# Patient Record
Sex: FEMALE | Race: ASIAN | NOT HISPANIC OR LATINO | Employment: UNEMPLOYED | URBAN - METROPOLITAN AREA
[De-identification: names, ages, dates, MRNs, and addresses within clinical notes are randomized per-mention and may not be internally consistent; named-entity substitution may affect disease eponyms.]

---

## 2017-03-16 ENCOUNTER — OFFICE VISIT - HEALTHEAST (OUTPATIENT)
Dept: FAMILY MEDICINE | Facility: CLINIC | Age: 27
End: 2017-03-16

## 2017-03-16 DIAGNOSIS — Z00.00 ROUTINE GENERAL MEDICAL EXAMINATION AT A HEALTH CARE FACILITY: ICD-10-CM

## 2017-03-16 DIAGNOSIS — L70.9 ACNE: ICD-10-CM

## 2017-03-16 DIAGNOSIS — L65.9 HAIR LOSS: ICD-10-CM

## 2017-03-16 DIAGNOSIS — N92.6 ABNORMAL MENSTRUAL PERIODS: ICD-10-CM

## 2017-03-16 DIAGNOSIS — Z23 NEED FOR HPV VACCINATION: ICD-10-CM

## 2017-03-16 DIAGNOSIS — R10.9 ABDOMINAL PAIN: ICD-10-CM

## 2017-03-16 DIAGNOSIS — R53.83 FATIGUE: ICD-10-CM

## 2017-03-16 DIAGNOSIS — A59.01 TRICHOMONAS VAGINITIS: ICD-10-CM

## 2017-03-16 ASSESSMENT — MIFFLIN-ST. JEOR: SCORE: 1124.48

## 2017-03-17 ENCOUNTER — COMMUNICATION - HEALTHEAST (OUTPATIENT)
Dept: FAMILY MEDICINE | Facility: CLINIC | Age: 27
End: 2017-03-17

## 2017-03-17 ENCOUNTER — AMBULATORY - HEALTHEAST (OUTPATIENT)
Dept: LAB | Facility: CLINIC | Age: 27
End: 2017-03-17

## 2017-03-17 DIAGNOSIS — Z00.00 ROUTINE GENERAL MEDICAL EXAMINATION AT A HEALTH CARE FACILITY: ICD-10-CM

## 2017-03-20 ENCOUNTER — COMMUNICATION - HEALTHEAST (OUTPATIENT)
Dept: FAMILY MEDICINE | Facility: CLINIC | Age: 27
End: 2017-03-20

## 2017-03-21 ENCOUNTER — COMMUNICATION - HEALTHEAST (OUTPATIENT)
Dept: SCHEDULING | Facility: CLINIC | Age: 27
End: 2017-03-21

## 2017-03-21 ENCOUNTER — AMBULATORY - HEALTHEAST (OUTPATIENT)
Dept: FAMILY MEDICINE | Facility: CLINIC | Age: 27
End: 2017-03-21

## 2017-03-22 ENCOUNTER — COMMUNICATION - HEALTHEAST (OUTPATIENT)
Dept: FAMILY MEDICINE | Facility: CLINIC | Age: 27
End: 2017-03-22

## 2017-03-22 LAB
BKR LAB AP ABNORMAL BLEEDING: NO
BKR LAB AP BIRTH CONTROL/HORMONES: NORMAL
BKR LAB AP CERVICAL APPEARANCE: NORMAL
BKR LAB AP GYN ADEQUACY: NORMAL
BKR LAB AP GYN INTERPRETATION: NORMAL
BKR LAB AP GYN OTHER FINDINGS: NORMAL
BKR LAB AP HPV REFLEX: NORMAL
BKR LAB AP LMP: NORMAL
BKR LAB AP PATIENT STATUS: NORMAL
BKR LAB AP PREVIOUS ABNORMAL: NORMAL
BKR LAB AP PREVIOUS NORMAL: NORMAL
HIGH RISK?: NO
PATH REPORT.COMMENTS IMP SPEC: NORMAL
RESULT FLAG (HE HISTORICAL CONVERSION): NORMAL

## 2017-04-17 ENCOUNTER — AMBULATORY - HEALTHEAST (OUTPATIENT)
Dept: FAMILY MEDICINE | Facility: CLINIC | Age: 27
End: 2017-04-17

## 2017-04-17 DIAGNOSIS — L70.9 ACNE: ICD-10-CM

## 2017-04-25 ENCOUNTER — OFFICE VISIT - HEALTHEAST (OUTPATIENT)
Dept: FAMILY MEDICINE | Facility: CLINIC | Age: 27
End: 2017-04-25

## 2017-04-25 DIAGNOSIS — L70.9 ACNE: ICD-10-CM

## 2017-04-25 DIAGNOSIS — A59.01 TRICHOMONAS VAGINITIS: ICD-10-CM

## 2017-04-25 ASSESSMENT — MIFFLIN-ST. JEOR: SCORE: 1111.44

## 2017-04-28 ENCOUNTER — COMMUNICATION - HEALTHEAST (OUTPATIENT)
Dept: FAMILY MEDICINE | Facility: CLINIC | Age: 27
End: 2017-04-28

## 2017-05-24 ENCOUNTER — COMMUNICATION - HEALTHEAST (OUTPATIENT)
Dept: FAMILY MEDICINE | Facility: CLINIC | Age: 27
End: 2017-05-24

## 2017-05-24 RX ORDER — BENZOYL PEROXIDE 5 G/100G
GEL TOPICAL
Qty: 45 G | Refills: 3 | Status: SHIPPED | OUTPATIENT
Start: 2017-05-24 | End: 2022-01-07

## 2017-11-08 ENCOUNTER — COMMUNICATION - HEALTHEAST (OUTPATIENT)
Dept: FAMILY MEDICINE | Facility: CLINIC | Age: 27
End: 2017-11-08

## 2017-11-13 ENCOUNTER — OFFICE VISIT - HEALTHEAST (OUTPATIENT)
Dept: FAMILY MEDICINE | Facility: CLINIC | Age: 27
End: 2017-11-13

## 2017-11-13 DIAGNOSIS — G43.109 OCULAR MIGRAINE: ICD-10-CM

## 2017-11-13 DIAGNOSIS — Z00.00 ROUTINE GENERAL MEDICAL EXAMINATION AT A HEALTH CARE FACILITY: ICD-10-CM

## 2017-11-13 RX ORDER — SUMATRIPTAN 50 MG/1
50 TABLET, FILM COATED ORAL
Qty: 10 TABLET | Refills: 3 | Status: SHIPPED | OUTPATIENT
Start: 2017-11-13 | End: 2022-01-07

## 2017-11-13 ASSESSMENT — MIFFLIN-ST. JEOR: SCORE: 1118.81

## 2017-11-14 ENCOUNTER — COMMUNICATION - HEALTHEAST (OUTPATIENT)
Dept: FAMILY MEDICINE | Facility: CLINIC | Age: 27
End: 2017-11-14

## 2017-12-26 ENCOUNTER — OFFICE VISIT - HEALTHEAST (OUTPATIENT)
Dept: FAMILY MEDICINE | Facility: CLINIC | Age: 27
End: 2017-12-26

## 2017-12-26 DIAGNOSIS — D23.9 DYSPLASTIC NEVUS: ICD-10-CM

## 2017-12-28 LAB
LAB AP CHARGES (HE HISTORICAL CONVERSION): NORMAL
PATH REPORT.COMMENTS IMP SPEC: NORMAL
PATH REPORT.FINAL DX SPEC: NORMAL
PATH REPORT.GROSS SPEC: NORMAL
PATH REPORT.MICROSCOPIC SPEC OTHER STN: NORMAL
PATH REPORT.RELEVANT HX SPEC: NORMAL
RESULT FLAG (HE HISTORICAL CONVERSION): NORMAL

## 2017-12-29 ENCOUNTER — COMMUNICATION - HEALTHEAST (OUTPATIENT)
Dept: FAMILY MEDICINE | Facility: CLINIC | Age: 27
End: 2017-12-29

## 2018-01-02 ENCOUNTER — OFFICE VISIT - HEALTHEAST (OUTPATIENT)
Dept: FAMILY MEDICINE | Facility: CLINIC | Age: 28
End: 2018-01-02

## 2018-01-02 DIAGNOSIS — D22.9 INTRADERMAL PIGMENTED NEVUS: ICD-10-CM

## 2018-01-02 ASSESSMENT — MIFFLIN-ST. JEOR: SCORE: 1135.82

## 2018-06-29 ENCOUNTER — OFFICE VISIT - HEALTHEAST (OUTPATIENT)
Dept: FAMILY MEDICINE | Facility: CLINIC | Age: 28
End: 2018-06-29

## 2018-06-29 DIAGNOSIS — L91.0 KELOID SCAR: ICD-10-CM

## 2018-07-31 ENCOUNTER — RECORDS - HEALTHEAST (OUTPATIENT)
Dept: ADMINISTRATIVE | Facility: OTHER | Age: 28
End: 2018-07-31

## 2019-10-01 ENCOUNTER — OFFICE VISIT - HEALTHEAST (OUTPATIENT)
Dept: FAMILY MEDICINE | Facility: CLINIC | Age: 29
End: 2019-10-01

## 2019-10-01 DIAGNOSIS — Z31.69 ENCOUNTER FOR PRECONCEPTION CONSULTATION: ICD-10-CM

## 2019-10-01 DIAGNOSIS — Z13.220 SCREENING FOR CHOLESTEROL LEVEL: ICD-10-CM

## 2019-10-01 LAB
CHOLEST SERPL-MCNC: 210 MG/DL
FASTING STATUS PATIENT QL REPORTED: NO
HDLC SERPL-MCNC: 66 MG/DL
LDLC SERPL CALC-MCNC: 119 MG/DL
TRIGL SERPL-MCNC: 125 MG/DL

## 2019-10-01 RX ORDER — PRENATAL VIT/IRON FUM/FOLIC AC 27MG-0.8MG
1 TABLET ORAL DAILY
Qty: 90 TABLET | Refills: 3 | Status: SHIPPED | OUTPATIENT
Start: 2019-10-01 | End: 2022-01-07

## 2019-10-01 ASSESSMENT — MIFFLIN-ST. JEOR: SCORE: 1176.64

## 2019-10-14 ENCOUNTER — COMMUNICATION - HEALTHEAST (OUTPATIENT)
Dept: FAMILY MEDICINE | Facility: CLINIC | Age: 29
End: 2019-10-14

## 2019-12-12 ENCOUNTER — OFFICE VISIT - HEALTHEAST (OUTPATIENT)
Dept: FAMILY MEDICINE | Facility: CLINIC | Age: 29
End: 2019-12-12

## 2019-12-12 DIAGNOSIS — H11.31 SUBCONJUNCTIVAL HEMORRHAGE OF RIGHT EYE: ICD-10-CM

## 2021-05-30 VITALS — WEIGHT: 95.5 LBS | BODY MASS INDEX: 17.57 KG/M2 | HEIGHT: 62 IN

## 2021-05-30 VITALS — BODY MASS INDEX: 17.94 KG/M2 | HEIGHT: 62 IN | WEIGHT: 97.5 LBS

## 2021-05-31 VITALS — WEIGHT: 96.25 LBS | HEIGHT: 62 IN | BODY MASS INDEX: 17.71 KG/M2

## 2021-05-31 VITALS — BODY MASS INDEX: 18.4 KG/M2 | HEIGHT: 62 IN | WEIGHT: 100 LBS

## 2021-06-01 VITALS — BODY MASS INDEX: 17.98 KG/M2 | WEIGHT: 99.12 LBS

## 2021-06-01 NOTE — PROGRESS NOTES
"S:  Dianne Ochoa is a 29 y.o. female who comes to the clinic today for  1.  Preconception counseling: She would like to get pregnant.  She did have a complication of a subchorionic hemorrhage as well as a marginal placenta previa that resolved during her last pregnancy.  She is wondering if there is anything that she can do to prevent this from happening again  She is not currently taking a prenatal vitamin.  She drinks no alcohol.  She has rare caffeine.  No new health problems in the family.  Her menses are not completely regular.  They will often be off by a week to week and a half.  She is worried because her nipples are not exactly the same size.  She says that it is essentially always been that way.  No other changes that she is noticed to her breasts.  They do get a little bit larger around the time of her menses and a little more tender but otherwise no discharge, no skin changes, no other problems.    She would like a screening test done for cholesterol today.  She is not fasting.  She denies any family history of significant heart disease or hypercholesterolemia.    I reviewed the pertinent family, social, surgical, medical history.      O:  /64 (Patient Site: Left Arm, Patient Position: Sitting, Cuff Size: Adult Regular)   Pulse 72   Temp 98  F (36.7  C) (Oral)   Resp 16   Ht 5' 2.25\" (1.581 m)   Wt 109 lb (49.4 kg)   LMP 2019   BMI 19.78 kg/m    Gen: no acute distress  Neck:  Supple, no lad,  Heart:  Regular rate and rhythm.  No m/r/g  Lungs: cta bilaterally, no wheezes or rhonchi.  Good air inspiration  Abdomen:  No masses or organomegaly  Extremities:  No edema.     Breasts: Bilateral breasts reveal that the nipples are not symmetric at the right nipple is very minimally larger than the left.  No other skin changes are noted.  No nipple discharge is noted.    Patient Active Problem List   Diagnosis     Pregnancy With Threatened      Placenta Previa     Antepartum hemorrhage     " Pain During Urination (Dysuria)     Pregnant     Group B Streptococcus carrier, delivered, current hospitalization     Normal delivery     H. pylori infection     Keloid scar     Current Outpatient Medications on File Prior to Visit   Medication Sig Dispense Refill     benzoyl peroxide 5 % gel Apply nightly to affected area 45 g 3     benzoyl peroxide 5 % topical lotion Use as directed to prevent acne 30 mL 12     cholecalciferol, vitamin D3, 2,000 unit cap Take 1 tablet by mouth.       condoms latex lubricated (CONDOMS-ARY LUBRICATED) Dejah Use as directed to prevent pregnancy 24 each 12     SUMAtriptan (IMITREX) 50 MG tablet Take 1 tablet (50 mg total) by mouth every 2 (two) hours as needed for migraine (max 200mg in 24hr period). 10 tablet 3     [DISCONTINUED] PRENATAL VITS W-CA,FE,FA,<1MG, (PRENATAL VITAMIN ORAL) Take 1 tablet by mouth.       No current facility-administered medications on file prior to visit.           No results found for this or any previous visit (from the past 48 hour(s)).     No images are attached to the encounter or orders placed in the encounter.       Assessment/Plan:  1. Encounter for preconception consultation  Reviewed the importance of folic acid and prenatal vitamin to prevent problems with spina bifida.  We reviewed the importance of a healthy plant-based diet with adequate protein.  We reviewed the importance of limiting caffeine.  We did talk about how there is not really anything that she can do to prevent subchorionic hemorrhage and/or placenta previa.  We did talk about timing of intercourse to account for irregular menses.  She will follow-up when she is pregnant or sooner if she has any problems.  I did advise her that her breast asymmetry is not unusual.  She will follow-up if she has any significant lumps or bumps discharge or other concerns.  - prenatal vit-iron fum-folic ac (PRENATAL VITAMIN) 27 mg iron- 0.8 mg Tab tablet; Take 1 tablet by mouth daily.  Dispense: 90  tablet; Refill: 3    2. Screening for cholesterol level    - Lipid Profile          Khadra Wheeler   10/1/2019 5:47 PM

## 2021-06-03 VITALS
RESPIRATION RATE: 16 BRPM | HEART RATE: 72 BPM | BODY MASS INDEX: 20.06 KG/M2 | DIASTOLIC BLOOD PRESSURE: 64 MMHG | SYSTOLIC BLOOD PRESSURE: 100 MMHG | TEMPERATURE: 98 F | WEIGHT: 109 LBS | HEIGHT: 62 IN

## 2021-06-04 VITALS
HEART RATE: 65 BPM | TEMPERATURE: 97.5 F | RESPIRATION RATE: 12 BRPM | DIASTOLIC BLOOD PRESSURE: 65 MMHG | SYSTOLIC BLOOD PRESSURE: 100 MMHG | WEIGHT: 110.8 LBS | BODY MASS INDEX: 20.1 KG/M2 | OXYGEN SATURATION: 100 %

## 2021-06-04 NOTE — PROGRESS NOTES
Chief Complaint   Patient presents with     Eye Problem     Rt upper eye red, no itchiness or burning, noticed 1 hr ago       HPI:  Dianne Ochoa is a 29 y.o. female who complains of redness of R upper eye which she noticed 1 hour ago.. Denies fever/chills, HA, vision changes, eye pain, drainage, itching, or FB sensation/exposure.    Problem List:  2018: Keloid scar  2017: H. pylori infection  2015: Pregnant  2015: Group B Streptococcus carrier, delivered, current   hospitalization  2015: Normal delivery  Pregnancy With Threatened   Placenta Previa  Antepartum hemorrhage  Pain During Urination (Dysuria)      Past Medical History:   Diagnosis Date     Pregnant        Social History     Tobacco Use     Smoking status: Never Smoker     Smokeless tobacco: Never Used   Substance Use Topics     Alcohol use: No       Review of Systems   Constitutional: Negative for chills and fever.   Eyes: Positive for redness. Negative for pain, discharge, itching and visual disturbance.   Respiratory: Negative for shortness of breath.    Cardiovascular: Negative for chest pain.   Gastrointestinal: Negative for abdominal pain, diarrhea, nausea and vomiting.   Skin: Negative for rash.   All other systems reviewed and are negative.      Vitals:    19 1308   BP: 100/65   Pulse: 65   Resp: 12   Temp: 97.5  F (36.4  C)   TempSrc: Oral   SpO2: 100%   Weight: 110 lb 12.8 oz (50.3 kg)       Physical Exam   Constitutional: She appears well-developed and well-nourished.  Non-toxic appearance.   HENT:   Head: Normocephalic and atraumatic.   Eyes: Pupils are equal, round, and reactive to light. EOM and lids are normal. Right conjunctiva has a hemorrhage (R upper eye behind eyelid).   Cardiovascular: Normal rate, regular rhythm and normal heart sounds.   Pulmonary/Chest: Effort normal and breath sounds normal.   Neurological: She is alert.   Skin: Skin is warm and dry.   Psychiatric: She has a normal mood and affect.        Labs:  No results found for this or any previous visit (from the past 72 hour(s)).    Radiology:    No results found.    Clinical Decision Making:      No sign of infection, PERRLA, EOMI. Discussed self limited nature of subconjunctival hemorrhage.    At the end of the encounter, I discussed results, diagnosis, medications. Discussed red flags for immediate return to clinic/ER, as well as indications for follow up if no improvement. Patient understood and agreed to plan. Patient was stable for discharge.    1. Subconjunctival hemorrhage of right eye           Return in about 2 weeks (around 12/26/2019) for Follow up w/ primary care provider if not improved.    PRINCE Kate, [facility]

## 2021-06-04 NOTE — PATIENT INSTRUCTIONS - HE
Patient Education     Subconjunctival Hemorrhage    A subconjunctival hemorrhage is when a blood vessel breaks open in the white of the eye. It causes a bright red patch in the white of the eye. It is similar to a bruise on the skin. This type of hemorrhage is common. It can look quite alarming, but it is usually harmless.  Understanding the conjunctiva  The conjunctiva is the thin layer that covers the inside of the eyelids and the surface of the eye. It has many tiny blood vessels that bring oxygen and nutrients to the eye. The sclera is the white part of the eye that lies beneath the conjunctiva. Sometimes a blood vessel in the conjunctiva breaks open and bleeds. The blood then collects under the conjunctiva and turns part of the eye red. Over several weeks, your body then absorbs the blood.  What causes subconjunctival hemorrhage?  In many cases the cause isn t known. But some health conditions may make it more likely. These include:    Eye injury    Eye surgery    High blood pressure    Inflammation of the conjunctiva    Contact lens use    Diabetes    Arteriosclerosis    Tumor of the conjunctiva    Diseases that affect blood clotting    Violent sneezing, coughing, or vomiting    Certain medicines that can increase bleeding, such as aspirin    Pushing hard during childbirth    Straining during constipation  Symptoms of subconjunctival hemorrhage  The main symptom is a red patch on the eye. You may notice it after waking up in the morning. In most cases just one eye will have a hemorrhage. It usually happens once and then goes away. But some health conditions may cause repeat hemorrhages. You may feel like you have something in your eye, but this is not common. The hemorrhage shouldn t affect your eyesight or cause any pain. If you do have pain, you may have another type of problem with your eye.  Diagnosing subconjunctival hemorrhage  Your healthcare provider will ask about your health history. You may have a  physical exam. This includes a basic eye exam. Your provider will make sure you don t have other causes of red eye that may need other treatment.  You will need to see an eye doctor (ophthalmologist) if you have had an eye injury. This doctor might use a special lighted microscope to look closely at your eye. This helps show the doctor if the injury hurt the eye itself and not just its outer layer.  If this is not your first subconjunctival hemorrhage, your doctor may need to find the cause. For example, you may need blood tests to check for a blood clotting disorder.  Treatment for subconjunctival hemorrhage  In most cases you will not need treatment. The red patch will usually go away on its own in a few weeks. It will turn from red to brown and then yellow. There are no treatments to speed up this process. Your doctor may suggest you use a warm compress and artificial tears eye drops to help relieve some of the redness.  If your subconjunctival hemorrhage was caused by a health condition, that condition will be treated. For example, you may need a blood pressure medicine to treat high blood pressure.  When to call your healthcare provider  Call your healthcare provider right away if you have any of these:    Hemorrhage that doesn t go away in 2 to 3 weeks    Eye pain    Loss of eyesight    Another subconjunctival hemorrhage    Date Last Reviewed: 2/1/2017 2000-2019 [employer]. [address] [place] [address], [place] [address]. All rights reserved. This information is not intended as a substitute for professional medical care. Always follow your healthcare professional's instructions.

## 2021-06-09 NOTE — PROGRESS NOTES
Assessment:      Healthy female exam.   1. Fatigue  Rule out organic causes of fatigue.  Encourage increased caloric intake.  Encourage increased exercise.  - Thyroid Cascade  - HM1(CBC and Differential)  - Pregnancy (Beta-hCG, Qual), Urine  - HM1 (CBC with Diff)    2. Hair loss      3. Routine general medical examination at a health care facility  Advised increase activity and increased caloric intake.  Continue with routine screening.    hpv vaccine done today.    - Thyroid Cascade  - Wet Prep, Vaginal  - Gynecologic Cytology (PAP Smear)  - condoms latex lubricated (CONDOMS-ARY LUBRICATED) Dejah; Use as directed to prevent pregnancy  Dispense: 24 each; Refill: 12  - Chlamydia trachomatis & Neisseria gonorrhoeae, Amplified Detection; Future    4. Abnormal menstrual periods  upt negative.    Encouraged increased calorie intake  - Thyroid Cascade  - HM1(CBC and Differential)  - Pregnancy (Beta-hCG, Qual), Urine  - HM1 (CBC with Diff)    5. Abdominal pain  Rule out h pylori.    Eat small frequent meals.   Ranitidine rx given today.    Follow up if sx worsen.    - H. pylori Antibody, IgG    6. Acne    - benzoyl peroxide 5 % topical lotion; Use as directed to prevent acne  Dispense: 30 mL; Refill: 12    7. Need for HPV vaccination    - HPV vaccine 9 valent 3 dose IM    8. Trichomonas vaginitis  Will contact pt with this result, and order urine gc/chlam.    - metroNIDAZOLE (FLAGYL) 500 MG tablet; Take 1 tablet (500 mg total) by mouth 2 (two) times a day for 7 days.  Dispense: 14 tablet; Refill: 0          Subjective:      Dianne Ochoa is a 26 y.o. female who presents for an annual exam. The patient is sexually active. The patient participates in regular exercise: no. The patient reports that there is not domestic violence in her life.     Healthy Habits:   Regular Exercise: No  Sunscreen Use: No- yes in the summer  Healthy Diet: Yes  Dental Visits Regularly: Yes  Seat Belt: Yes  Sexually active: Yes  Self Breast Exam  Monthly:No  Hemoccults: N/A  Flex Sig: N/A  Colonoscopy: N/A  Lipid Profile: N/A  Glucose Screen: N/A  Prevention of Osteoporosis: N/A  Last Dexa: N/A  Guns at Home:  No      Immunization History   Administered Date(s) Administered     HPV 9 Valent 2017     Hep B, historic 2004, 2005, 2005     IPV 2004, 2005, 2005     Influenza [id number], 2009     Influenza,seasonal quad, PF 10/03/2014     MMR 2004, 2005     Td, historic 2004, 2005     Tdap 2014     Varicella 2004     Immunization status: up to date and documented, missing doses of HPV, flu.    No exam data present    Gynecologic History  Patient's last menstrual period was 2017 (approximate).  Contraception: none  Last Pap: unknown. Results were: unsure  Last mammogram: n/a. Results were: n/a      OB History    Para Term  AB SAB TAB Ectopic Multiple Living   6 4 3 1 2 2    4      # Outcome Date GA Lbr Alfred/2nd Weight Sex Delivery Anes PTL Lv   6  01/17/15 36w2d 06:12 / 00:15 5 lb 7.5 oz (2.48 kg) F Vag-Spont None N Y   5 2012        N   4 2012        N   3 Term 09/08/10 40w0d  5 lb 5 oz (2.41 kg) F Vag-Spont   Y   2 Term 10/10/09 40w0d  6 lb 4 oz (2.835 kg) F Vag-Spont   Y   1 Term 10/16/08 40w0d  6 lb 6 oz (2.892 kg) F Vag-Spont   Y          Current Outpatient Prescriptions   Medication Sig Dispense Refill     benzoyl peroxide 5 % topical lotion Use as directed to prevent acne 30 mL 12     cholecalciferol, vitamin D3, 2,000 unit cap Take 1 tablet by mouth.       condoms latex lubricated (CONDOMS-ARY LUBRICATED) Dejah Use as directed to prevent pregnancy 24 each 12     PRENATAL VITS W-CA,FE,FA,<1MG, (PRENATAL VITAMIN ORAL) Take 1 tablet by mouth.       No current facility-administered medications for this visit.      Past Medical History:   Diagnosis Date     Pregnant      Past Surgical History:   Procedure Laterality Date     [place] ABDOMEN SURGERY PROC  "UNLISTED      Description: Hernia Repair;  Recorded: 08/12/2014;     Review of patient's allergies indicates no known allergies.  Family History   Problem Relation Age of Onset     No Medical Problems Mother      No Medical Problems Father      Social History     Social History     Marital status: Single     Spouse name: N/A     Number of children: N/A     Years of education: N/A     Occupational History     Not on file.     Social History Main Topics     Smoking status: Never Smoker     Smokeless tobacco: Never Used     Alcohol use No     Drug use: No     Sexual activity: Yes     Partners: Male     Birth control/ protection: None     Other Topics Concern     Not on file     Social History Narrative       Review of Systems  General:  Denies problem  Eyes: Denies problem  Ears/Nose/Throat: Denies problem  Cardiovascular: Denies problem  Respiratory:  Denies problem  Gastrointestinal:  Denies problem, Genitourinary: Denies problem  Musculoskeletal:  Denies problem  Skin: Denies problem  Neurologic: Denies problem  Psychiatric: Denies problem  Endocrine: Denies problem  Heme/Lymphatic: Denies problem   Allergic/Immunologic: Denies problem        Objective:         Vitals:    03/16/17 1118   BP: (!) 84/54   Pulse: 71   Resp: 16   Temp: 97.6  F (36.4  C)   TempSrc: Oral   SpO2: 99%   Weight: (!) 97 lb 8 oz (44.2 kg)   Height: 5' 2.25\" (1.581 m)     Body mass index is 17.69 kg/(m^2).    Physical Exam:  General Appearance: Alert, cooperative, no distress, appears stated age  Head: Normocephalic, without obvious abnormality, atraumatic  Eyes: PERRL, conjunctiva/corneas clear, EOM's intact  Ears: Normal TM's and external ear canals, both ears  Nose: Nares normal, septum midline,mucosa normal, no drainage  Throat: Lips, mucosa, and tongue normal; teeth and gums normal  Neck: Supple, symmetrical, trachea midline, no adenopathy;  thyroid: not enlarged, symmetric, no tenderness/mass/nodules; no carotid bruit or JVD  Back: " Symmetric, no curvature, ROM normal, no CVA tenderness  Lungs: Clear to auscultation bilaterally, respirations unlabored  Breasts: No breast masses, tenderness, asymmetry, or nipple discharge.  Heart: Regular rate and rhythm, S1 and S2 normal, no murmur, rub, or gallop, Abdomen: Soft, non-tender, bowel sounds active all four quadrants,  no masses, no organomegaly  Pelvic:Normally developed genitalia with no external lesions or eruptions. Vagina demonstrates a large amount of discharge.  Cervix is normal. No cystocele, No rectocele. Uterus midline.  No adnexal mass or tenderness.    Extremities: Extremities normal, atraumatic, no cyanosis or edema  Skin: large amount of acne over back and face.  Open and closed comedones noted.    Lymph nodes: Cervical, supraclavicular, and axillary nodes normal  Neurologic: Normal

## 2021-06-10 NOTE — PROGRESS NOTES
"S:  26-year-old female who comes in today for follow-up of her trichomonas.  She denies any unusual vaginal symptoms.  She has not had sex with her  since she received the treatment.  She did complete the entire course of antibiotics.  Her  was also tested for Trichomonas.  It is unclear whether or not he received any treatment.  She denies any further abdominal pain.  She did complete the whole treatment for H pylori.  She is wondering whether or not this bacteria is gone.  She denies any bowel or bladder problems.  She denies any nausea or vomiting.  O:  BP (!) 74/56 (Patient Site: Left Arm, Patient Position: Sitting, Cuff Size: Adult Regular)  Pulse 68  Temp 98.1  F (36.7  C) (Oral)   Resp 24  Ht 5' 2\" (1.575 m)  Wt (!) 95 lb 8 oz (43.3 kg)  LMP 2017 (Within Days)  BMI 17.47 kg/m2  Gen:  Nad, alert  Genitourinary Exam:   Vulva: normal skin.  No lesions noted.  Nontender.    Urethral meatus: normal size and location, no lesions or discharge   Urethra: no tenderness or masses   Bladder: no fullness or tenderness   Vagina: normal appearance, physiologic discharge. No evidence of cystocele or rectocele.   Cervix: normal appearance, no lesions, no cervical motion tenderness   Uterus: normal size and position, mobile, non-tender   Pap smear obtained: no  Adnexa: no palpable masses bilaterally   Rectal exam: deferred         Patient Active Problem List   Diagnosis     Pregnancy With Threatened      Placenta Previa     Bleeding During Pregnancy     Pain During Urination (Dysuria)     Pregnant     Group B Streptococcus carrier, delivered, current hospitalization     Normal delivery     H. pylori infection     Current Outpatient Prescriptions on File Prior to Visit   Medication Sig Dispense Refill     ranitidine (ZANTAC) 150 MG tablet Take 1 tablet (150 mg total) by mouth 2 (two) times a day. 60 tablet 1     cholecalciferol, vitamin D3, 2,000 unit cap Take 1 tablet by mouth.       condoms " latex lubricated (CONDOMS-ARY LUBRICATED) Dejah Use as directed to prevent pregnancy 24 each 12     PRENATAL VITS W-CA,FE,FA,<1MG, (PRENATAL VITAMIN ORAL) Take 1 tablet by mouth.       [DISCONTINUED] benzoyl peroxide 5 % topical lotion Use as directed to prevent acne 30 mL 12     No current facility-administered medications on file prior to visit.           Recent Results (from the past 48 hour(s))   Wet Prep, Vaginal    Collection Time: 04/25/17 12:12 PM   Result Value Ref Range    Yeast Result Yeast Seen (!) No yeast seen    Trichomonas No Trichomonas seen No Trichomonas seen    Clue Cells, Wet Prep No Clue cells seen No Clue cells seen         Assessment/Plan:  1. Trichomonas vaginitis  We will inform the patient that her trichomonas is resolved.  I have encouraged her to speak with her  about whether or not his test came back positive.  - Wet Prep, Vaginal    2. Acne    - benzoyl peroxide 5 % topical lotion; Use as directed to prevent acne  Dispense: 30 mL; Refill: 12          Khadra Wheeler   4/25/2017 3:58 PM

## 2021-06-14 NOTE — PROGRESS NOTES
"S:  26 yo female who is here in follow up of her trichomonas and her h pylori.  She says she eats, but not a lot of food.  She eats 3-4 meals daily.  They are normal sized.  She eats vegetables and fruits once daily . She drinks a lot of water.  She denies any problems.  She has a low appetite.  Her mood is ok.  She says that food is not interesting.  Food does not taste good.  She says things are good between her and her .  She feels hungry.  She denies any symptoms similar to what she had when she had H pylori.  She did finish the complete course of medications for H pylori.    She does not want to be pregnant right now.  She is using condoms intermittently.    She says she has ongoing discharge vaginally.  This itches once in a while, but not al lthe time.  It has a very strong odor, like vinegar.    She does complain of several episodes of a year of situation where she developed some wavy lights in her eyes.  These gradually get larger and then disappear after about 30 minutes.  They are followed by a severe headache.  The headache can be on either side of her head.  These are not new for her.  She denies any numbness, tingling, weakness in any part of her body associated with these.  These tend to be worse when she has not slept well.    O:  BP (!) 78/54 (Patient Site: Left Arm, Patient Position: Sitting, Cuff Size: Adult Regular)  Pulse 80  Temp 97.8  F (36.6  C) (Oral)   Resp 16  Ht 5' 2.25\" (1.581 m)  Wt (!) 96 lb 4 oz (43.7 kg)  LMP 10/15/2017  BMI 17.46 kg/m2  Gen: no acute distress  Heent;  Conjunctiva and sclera are normal.  Bilateral tm's are normal.  Oropharynx is normal. Bilateral nares are normal.  PEERLA.  Eomi.      Neck:  Supple, no lad, no carotid bruits  Heart:  Regular rate and rhythm.  No m/r/g  Lungs: cta bilaterally, no wheezes or rhonchi.  Good air inspiration  Abdomen:  No masses or organomegaly  Extremities:  No edema.     Genitourinary Exam:   Vulva: normal skin.  No " lesions noted.  Nontender.    Urethral meatus: normal size and location, no lesions or discharge   Urethra: no tenderness or masses   Bladder: no fullness or tenderness   Vagina: normal appearance, physiologic discharge. No evidence of cystocele or rectocele.   Cervix: normal appearance, no lesions, no cervical motion tenderness   Uterus: normal size and position, mobile, non-tender   Pap smear obtained: no  Adnexa: no palpable masses bilaterally   Rectal exam: deferred             Patient Active Problem List   Diagnosis     Pregnancy With Threatened      Placenta Previa     Bleeding During Pregnancy     Pain During Urination (Dysuria)     Pregnant     Group B Streptococcus carrier, delivered, current hospitalization     Normal delivery     H. pylori infection     Current Outpatient Prescriptions on File Prior to Visit   Medication Sig Dispense Refill     benzoyl peroxide 5 % gel Apply nightly to affected area 45 g 3     benzoyl peroxide 5 % topical lotion Use as directed to prevent acne 30 mL 12     cholecalciferol, vitamin D3, 2,000 unit cap Take 1 tablet by mouth.       condoms latex lubricated (CONDOMS-ARY LUBRICATED) Dejah Use as directed to prevent pregnancy 24 each 12     PRENATAL VITS W-CA,FE,FA,<1MG, (PRENATAL VITAMIN ORAL) Take 1 tablet by mouth.       ranitidine (ZANTAC) 150 MG tablet Take 1 tablet (150 mg total) by mouth 2 (two) times a day. 60 tablet 1     No current facility-administered medications on file prior to visit.           Recent Results (from the past 48 hour(s))   Wet Prep, Vaginal    Collection Time: 17  9:47 AM   Result Value Ref Range    Yeast Result Yeast Seen (!) No yeast seen    Trichomonas No Trichomonas seen No Trichomonas seen    Clue Cells, Wet Prep No Clue cells seen No Clue cells seen         Assessment/Plan:  1. Ocular migraine  If they increase infrequency, will refer to neurology.    - SUMAtriptan (IMITREX) 50 MG tablet; Take 1 tablet (50 mg total) by mouth  every 2 (two) hours as needed for migraine (max 200mg in 24hr period).  Dispense: 10 tablet; Refill: 3    2. Routine general medical examination at a health care facility    - Chlamydia trachomatis & Neisseria gonorrhoeae, Amplified Detection  - Wet Prep, Vaginal  Advised that I will not recheck her for h pylori unless she develops sx.          Khadra Wheeler   11/13/2017 9:16 AM

## 2021-06-15 PROBLEM — A04.8 H. PYLORI INFECTION: Status: ACTIVE | Noted: 2017-03-21

## 2021-06-15 NOTE — PROGRESS NOTES
Punch Biopsy Procedure Note    Pre-operative Diagnosis: Dysplastic nevi    Post-operative Diagnosis: normal    Locations:chest     Indications: mole is increasing in size and becoming darker in color    Anesthesia: Lidocaine 1% with epinephrine     Procedure Details   History of allergy to iodine: no  Patient informed of the risks (including bleeding and infection) and benefits of the   procedure and Verbal informed consent obtained.    The lesion and surrounding area was given a sterile prep using betadyne and draped in the usual sterile fashion. The skin was then stretched perpendicular to the skin tension lines and the lesion removed using the 6mm punch. The resulting ellipse was then closed. The wound was closed with 3-0 ethilon using simple interrupted stitches. Antibiotic ointment and a sterile dressing applied. The specimen was sent for pathologic examination. The patient tolerated the procedure well.    EBL: less than 1 ml    Findings:  none    Condition:  Stable    Complications:  none.    Plan:  1. Instructed to keep the wound dry and covered for 24-48h and clean thereafter.  2. Warning signs of infection were reviewed.    3. Recommended that the patient use OTC analgesics as needed for pain.   4. Return for suture removal in 7 days.

## 2021-06-15 NOTE — PROGRESS NOTES
Subjective:   Dianne presents today for evaluation of a wound.  She had a nevus removed 1 week ago.  Sutures were placed.  She needs the sutures removed.  She has been trying to keep the wound open.  She denies any drainage from the area.  It is starting to itch quite a bit now.      Objective:  Skin: There are 3 sutures in place in the lower anterior chest area.  No exudative process seen.  Mild erythema noted around the sutures.  Wound appears to be healing nicely.  All 3 sutures were removed today without incident.  The area was nontender.      Assessment:  1.  Pigmented intradermal nevus      Plan:  I instructed patient this was a benign condition.  A Band-Aid was placed overlying the wound.  She will keep this covered until the wound is totally healed over.  She will follow-up here only if she notes any abnormal healing process such as increasing exudative process or increasing pain or irritation.

## 2021-06-16 PROBLEM — L91.0 KELOID SCAR: Status: ACTIVE | Noted: 2018-06-29

## 2021-06-17 NOTE — PATIENT INSTRUCTIONS - HE
Patient Instructions by Khadra Wheeler MD at 10/1/2019  1:30 PM     Author: Khadra Wheeler MD Service: -- Author Type: Physician    Filed: 10/1/2019  2:04 PM Encounter Date: 10/1/2019 Status: Signed    : Khadra Wheeler MD (Physician)       Patient Education     Preparing for Pregnancy  BIG: Even before you become pregnant, your health matters to your future baby. Adopt good health habits today. And take care of any medical problems you have before becoming pregnant.  Remember: As soon as you know you are pregnant, get regular prenatal care.  Things to consider  Read through the list below. The more items that describe you, the healthier you may be:    I eat a balanced diet.    I keep physically active.    I have my health problems under control.    My weight is about right.    I dont smoke.    I dont use recreational drugs.    I dont have a drinking problem.  Think about the following:    Who will help you through pregnancy and with childcare?    Do you have health insurance?    Do you have the money needed to cover childcare and other day-to-day child expenses?    Will you be able to take the time you need away from your job for maternity needs and childcare?  Adopt a healthy lifestyle  Each of the following tips can improve your health as you prepare for pregnancy:    Take folic acid 400 to 800 micrograms or a prenatal vitamin daily.     Eat a healthy, well-balanced diet.    Exercise 3 or more times a week and at least 150 minutes weekly.    Get within 15 pounds of your ideal weight.  The first weeks of pregnancy are the most important time in a babys development. Before you become pregnant:    Dont use recreational drugs.    Dont drink alcohol.    Dont smoke.    Get recommended vaccines.  Working with your healthcare provider  Your healthcare provider can help answer any questions you may have. Do you know when to stop taking birth control pills? Are any over-the-counter medicines safe  for pregnant women? You can also ask about special care you may need if you have any of the following:    Sexually transmitted diseases (STDs), like herpes or chlamydia    Diabetes    High blood pressure    Other chronic health problems  Date Last Reviewed: 10/1/2017    7042-2266 [employer]. [address] [place] [address]. All rights reserved. This information is not intended as a substitute for professional medical care. Always follow your healthcare professional's instructions.

## 2021-06-19 NOTE — PROGRESS NOTES
S:  27-year-old female who comes in today for follow-up of an area where she had a mole removed earlier this year.  In that interim she has started to develop a large amount of tissue over the area.  There is occasionally itchy.  Is occasionally erythematous.  Is not bleeding.  She has no history of similar.  She has not tried any creams over the area.  She does notice that when her bra pushes on it it seems to flatten out just a little bit.  O:  BP (!) 88/58 (Patient Site: Left Arm, Patient Position: Sitting, Cuff Size: Adult Regular)  Pulse 94  Resp 14  Wt (!) 99 lb 1.9 oz (45 kg)  LMP 2018  SpO2 99%  BMI 17.98 kg/m2  Gen:  Nad, alert  Skin: Keloid scar on the upper abdomen at the site of a prior mole removal.    Patient Active Problem List   Diagnosis     Pregnancy With Threatened      Placenta Previa     Bleeding During Pregnancy     Pain During Urination (Dysuria)     Pregnant     Group B Streptococcus carrier, delivered, current hospitalization     Normal delivery     H. pylori infection     Keloid scar     Current Outpatient Prescriptions on File Prior to Visit   Medication Sig Dispense Refill     benzoyl peroxide 5 % gel Apply nightly to affected area 45 g 3     benzoyl peroxide 5 % topical lotion Use as directed to prevent acne 30 mL 12     cholecalciferol, vitamin D3, 2,000 unit cap Take 1 tablet by mouth.       condoms latex lubricated (CONDOMS-ARY LUBRICATED) Dejah Use as directed to prevent pregnancy 24 each 12     PRENATAL VITS W-CA,FE,FA,<1MG, (PRENATAL VITAMIN ORAL) Take 1 tablet by mouth.       SUMAtriptan (IMITREX) 50 MG tablet Take 1 tablet (50 mg total) by mouth every 2 (two) hours as needed for migraine (max 200mg in 24hr period). 10 tablet 3     No current facility-administered medications on file prior to visit.           No results found for this or any previous visit (from the past 48 hour(s)).      Assessment/Plan:  1. Keloid scar  Refer to dermatology for further  evaluation and treatment.  I did let her know that this could occur with other procedures in the future.    - Ambulatory referral to Dermatology          Khadra Wheeler   6/29/2018 4:14 PM

## 2021-06-19 NOTE — LETTER
Letter by Khadra Wheeler MD at      Author: Khadra Wheeler MD Service: -- Author Type: --    Filed:  Encounter Date: 10/14/2019 Status: Signed               [address]  [place] [address]   PHONE [phone]  [phone]     October 14, 2019  Dianne Ochoa  [facility] [address] [place] [address]    Dear Dianne:    Below are the results from your recent visit.  Your results are normal.      Resulted Orders   Lipid Profile   Result Value Ref Range    Triglycerides 125 <=149 mg/dL    Cholesterol 210 (H) <=199 mg/dL    LDL Calculated 119 <=129 mg/dL    HDL Cholesterol 66 >=50 mg/dL    Patient Fasting > 8hrs? No          If you have any questions or concerns, please do not hesitate to call.    Sincerely,      Khadra Wheeler MD  October 14, 2019

## 2021-06-27 ENCOUNTER — HEALTH MAINTENANCE LETTER (OUTPATIENT)
Age: 31
End: 2021-06-27

## 2021-10-17 ENCOUNTER — HEALTH MAINTENANCE LETTER (OUTPATIENT)
Age: 31
End: 2021-10-17

## 2021-12-16 ENCOUNTER — TELEPHONE (OUTPATIENT)
Dept: FAMILY MEDICINE | Facility: CLINIC | Age: 31
End: 2021-12-16

## 2021-12-16 NOTE — TELEPHONE ENCOUNTER
Reason for Call:  Other appointment    Detailed comments: Preg Confirmation / [facility]    Phone Number Patient can be reached at: Cell number on file:    Telephone Information:   Mobile [phone]       Best Time: Anytime    Can we leave a detailed message on this number? YES    Call taken on 12/16/2021 at 10:22 AM by Mosne Spear

## 2021-12-31 ENCOUNTER — OFFICE VISIT (OUTPATIENT)
Dept: FAMILY MEDICINE | Facility: CLINIC | Age: 31
End: 2021-12-31
Payer: MEDICAID

## 2021-12-31 VITALS
HEART RATE: 84 BPM | TEMPERATURE: 98.7 F | OXYGEN SATURATION: 100 % | SYSTOLIC BLOOD PRESSURE: 97 MMHG | DIASTOLIC BLOOD PRESSURE: 66 MMHG

## 2021-12-31 DIAGNOSIS — K59.00 CONSTIPATION, UNSPECIFIED CONSTIPATION TYPE: Primary | ICD-10-CM

## 2021-12-31 DIAGNOSIS — K64.4 EXTERNAL HEMORRHOIDS: ICD-10-CM

## 2021-12-31 PROCEDURE — 99213 OFFICE O/P EST LOW 20 MIN: CPT | Performed by: FAMILY MEDICINE

## 2021-12-31 RX ORDER — POLYETHYLENE GLYCOL 3350 17 G/17G
1 POWDER, FOR SOLUTION ORAL DAILY
Qty: 225 G | Refills: 0 | Status: SHIPPED | OUTPATIENT
Start: 2021-12-31 | End: 2022-01-07

## 2021-12-31 RX ORDER — BISACODYL 10 MG
10 SUPPOSITORY, RECTAL RECTAL DAILY PRN
Qty: 10 SUPPOSITORY | Refills: 0 | Status: SHIPPED | OUTPATIENT
Start: 2021-12-31 | End: 2022-01-07

## 2021-12-31 NOTE — PROGRESS NOTES
Clinical Decision Making:    At the end of the encounter, I discussed results, diagnosis, medications. Discussed red flags for immediate return to clinic/ER, as well as indications for follow up if no improvement. Patient understood and agreed to plan. Patient was stable for discharge.      ICD-10-CM    1. Constipation, unspecified constipation type  K59.00    2. External hemorrhoids  K64.4        Patient information for constipation and for hemorrhoids  Recommended increase fluid intake and fiber in diet  Proctofoam as needed for pain of hemorrhoids  Dulcolax suppositories and MiraLAX as needed for constipation  Follow-up with primary in 1 to 2 weeks        There are no Patient Instructions on file for this visit.   No follow-ups on file.      chief complaint    HPI:  Dianne Ochoa is a 31 year old female who presents today complaining of rectal bleeding every time she has a stool for the last month.  She has not had a stool now since , 5 days.  It hurts when she sits.  Positive history of hemorrhoids  Denies any lightheadedness, dizziness.  Positive urine pregnancy test at home  She has not tried any medication for the constipation or the hemorrhoids    History obtained from the patient.    Problem List:  2018: Keloid scar  2017: H. pylori infection  2015: Pregnant  2015: Group B Streptococcus carrier, delivered, current   hospitalization  2015: Normal delivery  Pregnancy With Threatened   Placenta Previa  Antepartum hemorrhage  Pain During Urination (Dysuria)      No past medical history on file.    Social History     Tobacco Use     Smoking status: Never Smoker     Smokeless tobacco: Never Used   Substance Use Topics     Alcohol use: No       Review of systems  negative except listed in HPI    Vitals:    21 1608   BP: 97/66   BP Location: Right arm   Patient Position: Sitting   Cuff Size: Adult Regular   Pulse: 84   Temp: 98.7  F (37.1  C)   TempSrc: Tympanic   SpO2:  100%       Physical Exam  Vitals noted and within normal limits  In general she is alert, oriented, and in no acute distress  Abdomen with normal bowel sounds, soft, nondistended, nontender.  Rectal exam with several external hemorrhoids.  No thrombosed hemorrhoids.  No erythema or swelling.

## 2021-12-31 NOTE — PATIENT INSTRUCTIONS
Patient Education     Constipation (Adult)  Constipation means that you have bowel movements that are less frequent than usual. Stools often become very hard and difficult to pass.  Constipation is very common. At some point in life, it affects almost everyone. Since everyone's bowel habits are different, what is constipation to one person may not be to another. Your healthcare provider may do tests to diagnose constipation. It depends on what he or she finds when evaluating you.    Symptoms of constipation include:    Abdominal pain    Bloating    Vomiting    Painful bowel movements    Itching, swelling, bleeding, or pain around the anus  Causes  Constipation can have many causes. These include:    Diet low in fiber    Too much dairy    Not drinking enough liquids    Lack of exercise or physical activity (especially true for older adults)    Changes in lifestyle or daily routine, including pregnancy, aging, work, and travel    Frequent use or misuse of laxatives    Ignoring the urge to have a bowel movement or delaying it until later    Medicines, such as certain prescription pain medicines, iron supplements, antacids, certain antidepressants, and calcium supplements    Diseases like irritable bowel syndrome, bowel obstructions, stroke, diabetes, thyroid disease, Parkinson disease, hemorrhoids, and colon cancer  Complications  Potential complications of constipation can include:    Hemorrhoids    Rectal bleeding from hemorrhoids or anal fissures (skin tears)    Hernias    Dependency on laxatives    Chronic constipation    Fecal impaction, a severe form of constipation in which a large amount of hard stool is in your rectum that you can't pass    Bowel obstruction or perforation  Home care  All treatment should be done after talking with your healthcare provider. This is especially true if you have another medical problems, are taking prescription medicines, or are an older adult. Treatment most often involves  lifestyle changes. You may also need medicines. Your healthcare provider will tell you which will work best for you. Follow the advice below to help avoid this problem in the future.  Lifestyle changes  These lifestyle changes can help prevent constipation:    Diet. Eat a high-fiber diet, with fresh fruit and vegetables, and reduce dairy intake, meats, and processed foods    Fluids. It's important to get enough fluids each day. Drink plenty of water when you eat more fiber. If you are on diet that limits the amount of fluid you can have, talk about this with your healthcare provider.    Regular exercise. Check with your healthcare provider first.  Medicines  Take any medicines as directed. Some laxatives are safe to use only every now and then. Others can be taken on a regular basis. While laxatives don't cause bowel dependence, they are treating the symptoms. So your constipation may return if you don't make other changes. Talk with your healthcare provider or pharmacist if you have questions.  Prescription pain medicines can cause constipation. If you are taking this kind of medicine, ask your healthcare provider if you should also take a stool softener.  Medicines you may take to treat constipation include:    Fiber supplements    Stool softeners    Laxatives    Enemas    Rectal suppositories  Follow-up care  Follow up with your healthcare provider if symptoms don't get better in the next few days. You may need to have more tests or see a specialist.  Call 911  Call 911 if any of these occur:    Trouble breathing    Stiff, rigid abdomen that is severely painful to touch    Confusion    Fainting or loss of consciousness    Rapid heart rate    Chest pain  When to seek medical advice  Call your healthcare provider right away if any of these occur:    Fever of 100.4 F (38 C) or higher, or as directed by your healthcare provider    Failure to resume normal bowel movements    Pain in your abdomen or back gets  worse    Nausea or vomiting    Swelling in your abdomen    Blood in the stool    Black, tarry stool    Involuntary weight loss    Weakness  [employer] last reviewed this educational content on 6/1/2018 2000-2021 [employer]. All rights reserved. This information is not intended as a substitute for professional medical care. Always follow your healthcare professional's instructions.           Patient Education     Hemorrhoids    Hemorrhoids are swollen and inflamed veins inside the rectum and near the anus. The rectum is the last several inches of the colon. The anus is the passage between the rectum and the outside of the body.   Causes  The veins can become swollen due to increased pressure in them. This is most often caused by:     Chronic constipation or diarrhea    Straining when having a bowel movement    Sitting too long on the toilet    A low-fiber diet    Pregnancy  Symptoms    Bleeding from the rectum. You may notice this after bowel movements.    Lump near the anus    Itching around the anus    Pain around the anus    Mucus leaks from the anus  There are different types of hemorrhoids. Depending on the type you have and the severity, you may be able to treat yourself at home. In some cases, a procedure may be the best treatment option. Your healthcare provider can tell you more about this, if needed.   Home care  General care    To get relief from pain or itching, try:  ? Medicines. Your healthcare provider may recommend stool softeners, suppositories, or laxatives to help manage constipation. Use these exactly as directed.  ? Sitz baths. A sitz bath involves sitting in a few inches of warm bath water. Be careful not to make the water so hot that you burn yourself--test it before sitting in it. Soak for about 10 to 15 minutes a few times a day. This may help relieve pain.  ? Topical products. Your healthcare provider may prescribe or recommend creams, ointments, or pads that can be applied  to the hemorrhoid. Use these exactly as directed.  Tips to help prevent hemorrhoids     Eat more fiber. Fiber adds bulk to stool and absorbs water as it moves through your colon. This makes stool softer and easier to pass.  ? Increase the fiber in your diet with more fiber-rich foods. These include fresh fruit, vegetables, and whole grains.  ? Take a fiber supplement or bulking agent, if advised by your healthcare provider. These include products such as psyllium or methylcellulose.    Drink more water. Your healthcare provider may direct you to drink plenty of water. This can help keep stool soft.    Be more active. Frequent exercise aids digestion and helps prevent constipation. It may also help make bowel movements more regular.    Don t strain during bowel movements. This can make hemorrhoids more likely. Also, don t sit on the toilet for long periods of time.    Follow-up care  Follow up with your healthcare provider as advised. If a culture or imaging tests were done, someone will let you know the results when they are ready. This may take a few days or longer. If your healthcare provider recommends a procedure for your hemorrhoids, these options can be discussed. Options may include surgery and outpatient office treatments.   When to seek medical advice  Call your healthcare provider right away if any of these occur:     Increased bleeding from the rectum    Increased pain around the rectum or anus    Weakness or dizziness  Call 911  Call 911 if any of these occur:     Trouble breathing or swallowing    Fainting or loss of consciousness    Unusually fast heart rate    Vomiting blood    Large amounts of blood in stool or black, tarry stools  [employer] last reviewed this educational content on 8/1/2019 2000-2021 [employer]. All rights reserved. This information is not intended as a substitute for professional medical care. Always follow your healthcare professional's instructions.

## 2022-01-03 ENCOUNTER — TELEPHONE (OUTPATIENT)
Dept: NURSING | Facility: CLINIC | Age: 32
End: 2022-01-03
Payer: COMMERCIAL

## 2022-01-03 NOTE — TELEPHONE ENCOUNTER
Artis calling from [facility].  She received an order for hydrocortisone (PROCTOCORT) 10% rectal cream foam.  Patient is without insurance and this medication is $500.00.  Can provider send in alternate therapy?  Pharmacist recommended a hydrocortisone cream.  Writer will route not high priority to provider.  Zo Waldron RN  [facility] Nurse Advisor  10:04 AM 1/3/2022

## 2022-01-06 DIAGNOSIS — K64.4 EXTERNAL HEMORRHOIDS: Primary | ICD-10-CM

## 2022-01-06 RX ORDER — HYDROCORTISONE 25 MG/G
CREAM TOPICAL 2 TIMES DAILY PRN
Qty: 30 G | Refills: 0 | Status: SHIPPED | OUTPATIENT
Start: 2022-01-06 | End: 2022-01-07

## 2022-01-07 ENCOUNTER — OFFICE VISIT (OUTPATIENT)
Dept: FAMILY MEDICINE | Facility: CLINIC | Age: 32
End: 2022-01-07
Payer: MEDICAID

## 2022-01-07 ENCOUNTER — NURSE TRIAGE (OUTPATIENT)
Dept: NURSING | Facility: CLINIC | Age: 32
End: 2022-01-07
Payer: COMMERCIAL

## 2022-01-07 VITALS
TEMPERATURE: 98.4 F | BODY MASS INDEX: 18.86 KG/M2 | HEIGHT: 62 IN | DIASTOLIC BLOOD PRESSURE: 66 MMHG | SYSTOLIC BLOOD PRESSURE: 97 MMHG | WEIGHT: 102.5 LBS | HEART RATE: 102 BPM

## 2022-01-07 DIAGNOSIS — K60.2 ANAL FISSURE: Primary | ICD-10-CM

## 2022-01-07 DIAGNOSIS — Z33.1 PREGNANCY, INCIDENTAL: ICD-10-CM

## 2022-01-07 PROCEDURE — 99214 OFFICE O/P EST MOD 30 MIN: CPT | Performed by: FAMILY MEDICINE

## 2022-01-07 ASSESSMENT — MIFFLIN-ST. JEOR: SCORE: 1138.19

## 2022-01-07 NOTE — PROGRESS NOTES
Assessment & Plan    1. Anal fissure  This is a 30 yo female with rectal pain/pain with passing stool.  This has been present x 2 weeks.  She was evaluated last week and multiple medications were prescribed.  She could not afford the medications due to lack of insurance.  Now, she returns with similar symptoms - still has no insurance and tells me she can't afford any of the medications.  Wonders what to do now.  Also concerned about the medications in light of the fact that she is pregnant.  She denies any h/o herpes, no h/o anoreceptive intercourse.  Denies that stool is hard (I.e., denies constipation) - but it is hard to pass the stool due to pain.  Exam suggests inflammation/small fissure at posterior aspect of anal verge - skin tag, but no active hemorrhoids.  Patient admits her biggest concern is that she has cancer.      I think it would be best to try to treat this symptomatically - in light of pregnancy, workup will be limited at present; and pregnancy may increase tendency to constipation.  So - would like to get this taken care of.  Currently, I would encourage her to try Preparation H (OTC).  Discussed how to use it.  She suggests she will have insurance verified by end of the week - if so, she could get her previous prescriptions - I'd encourage the use of the hydrocortisone cream and/or the Proctofoam.  She will follow up with her initial OB visit.      2. Pregnancy, incidental  Patient reports LMP 10/28/2021 - making her 10w1d, and giving her EDC of 8/4/2022.  She has  4 previous pregnancies.  A confirmatory pregnancy test is not ordered today as presenting symptoms are not indicative of pregnancy problem, and without insurance there is no reason to increase her charges.      She tells me she had an appointment last week (12/27) with Dr. Wheeler, but was confused and thought it was the 7th (today).  She tells me she couldn't reschedule with Dr. Wheeler until mid March, so she didn't schedule.  This  "would put her at 20+ weeks (based on LMP) - so have asked her to schedule an initial OB intake evaluation with Alexia.  Further OB care can be scheduled from there.      Encourage PNV - declines until has insurance.  Could do OTC.        Return in about 2 weeks (around 2022) for initial OB visit.    Chief Complaint   Patient presents with     Rectal Problem      HPI  Complains of constipation - trouble passing stool  Bright red blood in stool  Seen previously - got medication for foam, cream, medication - but no medical insurance, and \"it's expensive\"  Has not established care for pregnancy yet -     4 children -   Had constipation in 3rd trimester with 1st pregnancy - but not this bad    LMP 10/28, now 10 w 1 day, EDC 2022         Patient Active Problem List   Diagnosis     Pregnancy With Threatened      Placenta Previa     Antepartum hemorrhage     Pain During Urination (Dysuria)     Pregnant     Group B Streptococcus carrier, delivered, current hospitalization     Normal delivery     H. pylori infection     Keloid scar        No past medical history on file.     No current outpatient medications on file.     No current facility-administered medications for this visit.        Past Surgical History:   Procedure Laterality Date     C UNLISTED PROCEDURE, ABDOMEN/PERITONEUM/OMENTUM      Description: Hernia Repair;  Recorded: 2014;        Social History     Socioeconomic History     Marital status: Single     Spouse name: Not on file     Number of children: Not on file     Years of education: Not on file     Highest education level: Not on file   Occupational History     Not on file   Tobacco Use     Smoking status: Never Smoker     Smokeless tobacco: Never Used   Substance and Sexual Activity     Alcohol use: No     Drug use: No     Sexual activity: Yes     Partners: Male     Birth control/protection: None   Other Topics Concern     Not on file   Social History Narrative     Not on file " "    Social Determinants of Health     Financial Resource Strain: Not on file   Food Insecurity: Not on file   Transportation Needs: Not on file   Physical Activity: Not on file   Stress: Not on file   Social Connections: Not on file   Intimate Partner Violence: Not on file   Housing Stability: Not on file        Family History   Problem Relation Age of Onset     No Known Problems Mother      No Known Problems Father         Review of Systems   Gastrointestinal: Positive for rectal pain (pain with passing stool). Negative for anal bleeding, nausea and vomiting.   Genitourinary:        LMP 10/28/2021 - currently pregnant - home pregnancy test positive     All other systems reviewed and are negative.       BP 97/66   Pulse 102   Temp 98.4  F (36.9  C)   Ht 1.575 m (5' 2\")   Wt 46.5 kg (102 lb 8 oz)   LMP 10/28/2021 (Exact Date)   BMI 18.75 kg/m       Physical Exam  Constitutional:       General: She is not in acute distress.     Appearance: She is well-developed.   HENT:      Right Ear: Tympanic membrane and external ear normal.      Left Ear: Tympanic membrane and external ear normal.      Nose: Nose normal.      Mouth/Throat:      Pharynx: No oropharyngeal exudate.   Eyes:      General:         Right eye: No discharge.         Left eye: No discharge.      Conjunctiva/sclera: Conjunctivae normal.      Pupils: Pupils are equal, round, and reactive to light.   Neck:      Thyroid: No thyromegaly.      Trachea: No tracheal deviation.   Cardiovascular:      Rate and Rhythm: Normal rate and regular rhythm.      Pulses: Normal pulses.      Heart sounds: Normal heart sounds, S1 normal and S2 normal. No murmur heard.  No friction rub. No S3 or S4 sounds.    Pulmonary:      Effort: Pulmonary effort is normal. No respiratory distress.      Breath sounds: Normal breath sounds. No wheezing or rales.   Abdominal:      General: Bowel sounds are normal.      Palpations: Abdomen is soft. There is no mass.      Tenderness: There " is no abdominal tenderness.   Genitourinary:     Comments: Small non-inflamed skin tag at posterior aspect anus; there is inflammation at anal verge with small fissure at posterior midline  Musculoskeletal:         General: Normal range of motion.      Cervical back: Neck supple.   Lymphadenopathy:      Cervical: No cervical adenopathy.   Skin:     General: Skin is warm and dry.      Findings: No rash.   Neurological:      Mental Status: She is alert and oriented to person, place, and time.      Motor: No abnormal muscle tone.      Deep Tendon Reflexes: Reflexes are normal and symmetric.   Psychiatric:         Thought Content: Thought content normal.         Judgment: Judgment normal.          Results:  No results found for any visits on 01/07/22.    Medications at Conclusion of Visit:  No current outpatient medications on file.         NANDA PERES MD

## 2022-01-07 NOTE — PATIENT INSTRUCTIONS
Try Preparation H - per label instructions.    If insurance comes available, fill the foam and the hydrocortisone cream.   Follow up with Alexia for 1st OB

## 2022-01-07 NOTE — TELEPHONE ENCOUNTER
Nurse Triage SBAR    Is this a 2nd Level Triage? NO    Situation  :    Patient calling stating she is having intermittent rectal bleeding only with bowel movements as patient states she is always straining to have a BM.     Patient states that she was diagnosed with Hemorrhoids externally 2 weeks ago. Each time patient has a BM, she gets moderate rectal bleeding present and after having bowel movement will have rectal discomfort/pain.    Background  :     Sx ongoing for the last 2 weeks.   Seen at [facility] on 12/31/21 for symptoms.     Assessment :    -Patient has external hemorrhoids  -Was seen at [facility] on 12/31/21.   -Patient is having normal stools, but straining to have a bowel movement.   -Rectal bleeding still present, every time patient goes to the bathroom (only with bowel movement).  -Rectal bleeding has been ongoing since [facility] visit and sx only occur when having bowel movements.   -Patient had rectal bleeding last night when passing a bowel movement.   -No rectal bleeding today yet.    -Burning sensation rectally. Only with bowel movements and after having bowel movements intermittently   -Patient thinks she is 8-10 weeks pregnant, was supposed to have a visit with Dr. Wheeler, but missed it due to thinking it was at a later date.   -Denies any lightlessness and dizziness currently.   -Denies any injury to the rectum  -Denies any fevers.   -Denies any abdominal pain  -Denies any severe rectal bleeding.     Recommendation :     Per protocol, recommendations are for patient to be seen in the office today, if no openings [facility] is advised. Transferred to scheduling (warm transfer). Advised patient to call back if she develops any new or worsening sx. Patient verbalized understanding and agrees with plan.     Protocol Recommended Disposition: BE SEEN IN OFFICE TODAY, IF NO OPENINGS [facility] ADVISED.    Tana Mcdaniel RN, BSN Nurse Triage Advisor 8:26 AM 1/7/2022     Reason for Disposition    MODERATE rectal pain (i.e.,  interferes with school, work, or sleep)    MODERATE rectal bleeding (small blood clots, passing blood without stool, or toilet water turns red)    Additional Information    Negative: [1] In labor AND [2] feels like pushing (or feels like having a BM)    Negative: Sounds like a life-threatening emergency to the triager    Negative: Foreign body in rectum    Negative: Diarrhea is main symptom    Negative: Blood in or on bowel movement is main symptom    Negative: Constipation is main symptom (e.g., pain or discomfort caused by passage of hard BMs)    Negative: Pinworms are suspected (rectal itching; (white thread-like worm about size of a staple, moves)    Negative: Sexual assault    Negative: Injury to rectum    Negative: [1] Pregnant 23 or more weeks AND [2] baby is moving less today (e.g., kick count < 5 in 1 hour or < 10 in 2 hours)    Negative: SEVERE rectal pain (e.g., excruciating, unable to have a bowel movement)    Negative: [1] Rectal pain or redness AND [2] fever > 100.4 F (38.0 C)    Negative: Patient sounds very sick or weak to the triager    Negative: Passed out (i.e., fainted, collapsed and was not responding)    Negative: Shock suspected (e.g., cold/pale/clammy skin, too weak to stand, low BP, rapid pulse)    Negative: Vomiting red blood or black (coffee ground) material    Negative: Sounds like a life-threatening emergency to the triager    Negative: Diarrhea is the main symptom    Negative: Rectal symptoms    Negative: SEVERE rectal bleeding (large blood clots; on and off, or constant bleeding)    Negative: SEVERE dizziness (e.g., unable to stand, requires support to walk, feels like passing out now)    Negative: MODERATE rectal bleeding (small blood clots, passing blood without stool, or toilet water turns red) more than once a day    Negative: Bloody, black, or tarry bowel movements (Exception: chronic-unchanged  black-grey bowel movements and is taking iron pills or Pepto-bismol)    Negative:  High-risk adult (e.g., prior surgery on aorta, abdominal aortic aneurysm)    Negative: Rectal foreign body (inserted or swallowed)    Negative: SEVERE abdominal pain (e.g., excruciating)    Negative: Constant abdominal pain lasting > 2 hours    Negative: Pale skin (pallor) of new onset or worsening    Negative: Patient sounds very sick or weak to the triager    Protocols used: PREGNANCY - RECTAL SYMPTOMS-A-AH, RECTAL BLEEDING-A-OH    COVID 19 Nurse Triage Plan/Patient Instructions    Please be aware that novel coronavirus (COVID-19) may be circulating in the community. If you develop symptoms such as fever, cough, or SOB or if you have concerns about the presence of another infection including coronavirus (COVID-19), please contact your health care provider or visit [email or web address].     Disposition/Instructions    In-Person Visit with provider recommended. Reference Visit Selection Guide.    Thank you for taking steps to prevent the spread of this virus.  o Limit your contact with others.  o Wear a simple mask to cover your cough.  o Wash your hands well and often.    Resources    [facility]: About COVID-19: [email or web address]    CDC: What to Do If You're Sick: www.cdc.gov/coronavirus/2019-ncov/about/steps-when-sick.html    CDC: Ending Home Isolation: www.cdc.gov/coronavirus/2019-ncov/hcp/disposition-in-home-patients.html     CDC: Caring for Someone: www.cdc.gov/coronavirus/2019-ncov/if-you-are-sick/care-for-someone.html     [facility]: Interim Guidance for Hospital Discharge to Home: www.health.[facility].[place]/diseases/coronavirus/hcp/hospdischarge.pdf    [facility] clinical trials (COVID-19 research studies): [email or web address]     Below are the COVID-19 hotlines at the [place] Department of Health ([facility]). Interpreters are available.   o For health questions: Call 982-065-3264 or 1-687.570.7420 (7 a.m. to 7 p.m.)  o For questions about schools and  childcare: Call 318-535-5025 or 1-357.619.8976 (7 a.m. to 7 p.m.)

## 2022-01-09 ASSESSMENT — ENCOUNTER SYMPTOMS
RECTAL PAIN: 1
NAUSEA: 0
ANAL BLEEDING: 0
VOMITING: 0

## 2022-01-19 ENCOUNTER — OFFICE VISIT (OUTPATIENT)
Dept: FAMILY MEDICINE | Facility: CLINIC | Age: 32
End: 2022-01-19
Payer: MEDICAID

## 2022-01-19 VITALS
TEMPERATURE: 97.2 F | RESPIRATION RATE: 18 BRPM | BODY MASS INDEX: 18.75 KG/M2 | DIASTOLIC BLOOD PRESSURE: 60 MMHG | SYSTOLIC BLOOD PRESSURE: 94 MMHG | HEART RATE: 69 BPM | WEIGHT: 102.5 LBS

## 2022-01-19 DIAGNOSIS — Z3A.11 11 WEEKS GESTATION OF PREGNANCY: Primary | ICD-10-CM

## 2022-01-19 DIAGNOSIS — N91.2 AMENORRHEA: ICD-10-CM

## 2022-01-19 LAB
ABO/RH(D): NORMAL
ALBUMIN UR-MCNC: ABNORMAL MG/DL
ANTIBODY SCREEN: NEGATIVE
APPEARANCE UR: CLEAR
BACTERIA #/AREA URNS HPF: ABNORMAL /HPF
BASOPHILS # BLD AUTO: 0 10E3/UL (ref 0–0.2)
BASOPHILS NFR BLD AUTO: 0 %
BILIRUB UR QL STRIP: NEGATIVE
COLOR UR AUTO: YELLOW
EOSINOPHIL # BLD AUTO: 0 10E3/UL (ref 0–0.7)
EOSINOPHIL NFR BLD AUTO: 1 %
ERYTHROCYTE [DISTWIDTH] IN BLOOD BY AUTOMATED COUNT: 12.9 % (ref 10–15)
GLUCOSE UR STRIP-MCNC: NEGATIVE MG/DL
HBA1C MFR BLD: 5.5 % (ref 0–5.6)
HCG UR QL: POSITIVE
HCT VFR BLD AUTO: 34.5 % (ref 35–47)
HGB BLD-MCNC: 11.2 G/DL (ref 11.7–15.7)
HGB UR QL STRIP: NEGATIVE
HIV 1+2 AB+HIV1 P24 AG SERPL QL IA: NEGATIVE
IMM GRANULOCYTES # BLD: 0 10E3/UL
IMM GRANULOCYTES NFR BLD: 0 %
KETONES UR STRIP-MCNC: NEGATIVE MG/DL
LEUKOCYTE ESTERASE UR QL STRIP: NEGATIVE
LYMPHOCYTES # BLD AUTO: 1.1 10E3/UL (ref 0.8–5.3)
LYMPHOCYTES NFR BLD AUTO: 14 %
MCH RBC QN AUTO: 29.2 PG (ref 26.5–33)
MCHC RBC AUTO-ENTMCNC: 32.5 G/DL (ref 31.5–36.5)
MCV RBC AUTO: 90 FL (ref 78–100)
MONOCYTES # BLD AUTO: 0.5 10E3/UL (ref 0–1.3)
MONOCYTES NFR BLD AUTO: 7 %
MUCOUS THREADS #/AREA URNS LPF: PRESENT /LPF
NEUTROPHILS # BLD AUTO: 5.8 10E3/UL (ref 1.6–8.3)
NEUTROPHILS NFR BLD AUTO: 78 %
NITRATE UR QL: NEGATIVE
PH UR STRIP: 6 [PH] (ref 5–8)
PLATELET # BLD AUTO: 136 10E3/UL (ref 150–450)
RBC # BLD AUTO: 3.84 10E6/UL (ref 3.8–5.2)
RBC #/AREA URNS AUTO: ABNORMAL /HPF
SP GR UR STRIP: 1.02 (ref 1–1.03)
SPECIMEN EXPIRATION DATE: NORMAL
SQUAMOUS #/AREA URNS AUTO: ABNORMAL /LPF
UROBILINOGEN UR STRIP-ACNC: 0.2 E.U./DL
WBC # BLD AUTO: 7.5 10E3/UL (ref 4–11)
WBC #/AREA URNS AUTO: ABNORMAL /HPF

## 2022-01-19 PROCEDURE — 86780 TREPONEMA PALLIDUM: CPT | Performed by: PHYSICIAN ASSISTANT

## 2022-01-19 PROCEDURE — 81001 URINALYSIS AUTO W/SCOPE: CPT | Performed by: PHYSICIAN ASSISTANT

## 2022-01-19 PROCEDURE — 86901 BLOOD TYPING SEROLOGIC RH(D): CPT | Performed by: PHYSICIAN ASSISTANT

## 2022-01-19 PROCEDURE — 86803 HEPATITIS C AB TEST: CPT | Performed by: PHYSICIAN ASSISTANT

## 2022-01-19 PROCEDURE — 86900 BLOOD TYPING SEROLOGIC ABO: CPT | Performed by: PHYSICIAN ASSISTANT

## 2022-01-19 PROCEDURE — 81025 URINE PREGNANCY TEST: CPT | Performed by: PHYSICIAN ASSISTANT

## 2022-01-19 PROCEDURE — 83036 HEMOGLOBIN GLYCOSYLATED A1C: CPT | Performed by: PHYSICIAN ASSISTANT

## 2022-01-19 PROCEDURE — 99207 PR FIRST OB VISIT: CPT | Performed by: PHYSICIAN ASSISTANT

## 2022-01-19 PROCEDURE — 83655 ASSAY OF LEAD: CPT | Mod: 90 | Performed by: PHYSICIAN ASSISTANT

## 2022-01-19 PROCEDURE — 86762 RUBELLA ANTIBODY: CPT | Performed by: PHYSICIAN ASSISTANT

## 2022-01-19 PROCEDURE — 86850 RBC ANTIBODY SCREEN: CPT | Performed by: PHYSICIAN ASSISTANT

## 2022-01-19 PROCEDURE — 87389 HIV-1 AG W/HIV-1&-2 AB AG IA: CPT | Performed by: PHYSICIAN ASSISTANT

## 2022-01-19 PROCEDURE — 87340 HEPATITIS B SURFACE AG IA: CPT | Performed by: PHYSICIAN ASSISTANT

## 2022-01-19 PROCEDURE — 87086 URINE CULTURE/COLONY COUNT: CPT | Performed by: PHYSICIAN ASSISTANT

## 2022-01-19 PROCEDURE — 36415 COLL VENOUS BLD VENIPUNCTURE: CPT | Performed by: PHYSICIAN ASSISTANT

## 2022-01-19 PROCEDURE — 99000 SPECIMEN HANDLING OFFICE-LAB: CPT | Performed by: PHYSICIAN ASSISTANT

## 2022-01-19 PROCEDURE — 85025 COMPLETE CBC W/AUTO DIFF WBC: CPT | Performed by: PHYSICIAN ASSISTANT

## 2022-01-19 RX ORDER — PNV NO.95/FERROUS FUM/FOLIC AC 28MG-0.8MG
1 TABLET ORAL DAILY
Qty: 100 CAPSULE | Refills: 3 | Status: SHIPPED | OUTPATIENT
Start: 2022-01-19 | End: 2022-10-12

## 2022-01-19 NOTE — LETTER
01/19/22          To Whom It May Concern:      Dianne Ochoa (1990) is pregnant.  Estimated Date of Delivery: Aug 4, 2022        Sincerely,    Alexia Montelongo PA-C

## 2022-01-19 NOTE — PATIENT INSTRUCTIONS
Pregnancy: about 11 weeks    Due Date: August 4, 2022    Next visit in 4 weeks  With Dr. Palacios  For Your First OB Visit        Someone should be calling you within 2-3 days to schedule your ultrasound appointment.  If you would like to schedule your ultrasound yourself, call #[phone].

## 2022-01-20 LAB
BACTERIA UR CULT: NO GROWTH
HBV SURFACE AG SERPL QL IA: NONREACTIVE
HCV AB SERPL QL IA: NEGATIVE
RUBV IGG SERPL QL IA: 3.86 INDEX
RUBV IGG SERPL QL IA: POSITIVE
T PALLIDUM AB SER QL: NONREACTIVE

## 2022-01-21 LAB — LEAD BLDV-MCNC: <2 UG/DL

## 2022-01-23 NOTE — PROGRESS NOTES
ASSESSMENT and PLAN:  1. Pregnancy Intake Appointment  Dianne Ochoa is 31 year old and .  Patient's last menstrual period was 10/28/2021 (exact date).  Estimated Date of Delivery: Aug 4, 2022  She will be seeing Dr. Palacios for OB care at [facility].  Screening pregnancy lab work was drawn.  Prenatal vitamin prescribed.  Problem list and current medications reviewed and updated.  Dating ultrasound ordered.  Potential exposures/risks discussed: work environment, raw meat/seafood/deli meat, home construction, cats, caffeine.  Declines COVID vaccination, I discussed she is at increased risk of hospitalization and pregnancy complications if she were to get COVID infection.  Follow up in 4 weeks.        HPI:  Dianne Ochoa is a 31 year old female here for pregnancy intake.  She is .  Patient's last menstrual period was 10/28/2021 (exact date).  Positive home pregnancy test the first week of December.  Took a Dae medicine in November, not sure what ingredients are, discussed not taking that anymore.    Past Medical History:   Diagnosis Date     Anemia      Antepartum hemorrhage     Created by Conversion      Group B Streptococcus carrier, delivered, current hospitalization 2015     Placenta Previa     Created by Conversion Replacement Utility updated for latest IMO load      Urogenital trichomoniasis         Past Surgical History:   Procedure Laterality Date     C UNLISTED PROCEDURE, ABDOMEN/PERITONEUM/OMENTUM      Description: Hernia Repair;  Recorded: 2014;        Current Outpatient Medications   Medication     Prenatal MV-Min-Fe Fum-FA-DHA (PRENATAL MULTIVITAMIN PLUS DHA) 27-0.8-250 MG CAPS     No current facility-administered medications for this visit.        Social History     Socioeconomic History     Marital status: Single     Spouse name: Not on file     Number of children: Not on file     Years of education: Not on file     Highest education level: Not on file   Occupational History      Not on file   Tobacco Use     Smoking status: Never Smoker     Smokeless tobacco: Never Used   Substance and Sexual Activity     Alcohol use: No     Drug use: No     Sexual activity: Yes     Partners: Male     Birth control/protection: None   Other Topics Concern     Not on file   Social History Narrative     Not on file     Social Determinants of Health     Financial Resource Strain: Not on file   Food Insecurity: Not on file   Transportation Needs: Not on file   Physical Activity: Not on file   Stress: Not on file   Social Connections: Not on file   Intimate Partner Violence: Not on file   Housing Stability: Not on file          OBJECTIVE:  Allergies   Allergen Reactions     No Known Drug Allergies Unknown     BP 94/60 (BP Location: Left arm, Patient Position: Sitting, Cuff Size: Adult Regular)   Pulse 69   Temp 97.2  F (36.2  C) (Temporal)   Resp 18   Wt 46.5 kg (102 lb 8 oz)   LMP 10/28/2021 (Exact Date)   BMI 18.75 kg/m     Body mass index is 18.75 kg/m .     Vital signs stable as recorded above.  General: Patient is alert and oriented x 3, in no apparent distress  Fetal Exam: FHR not heard, fundal height not palpable  Fetal motion seen on handheld ultrasound machine in clinic    Office Visit on 01/19/2022   Component Date Value Ref Range Status     hCG Urine Qualitative 01/19/2022 Positive* Negative Final     Culture 01/19/2022 No Growth   Final     Hepatitis B Surface Antigen 01/19/2022 Nonreactive  Nonreactive Final     HIV Antigen Antibody Combo 01/19/2022 Negative  Negative Final     Rubella Cynthia IgG Instrument Value 01/19/2022 3.86* <0.90 Index Final     Rubella Antibody IgG 01/19/2022 Positive* Negative Final     Treponema Antibody Total 01/19/2022 Nonreactive  Nonreactive Final     Hemoglobin A1C 01/19/2022 5.5  0.0 - 5.6 % Final     Hepatitis C Antibody 01/19/2022 Negative  Negative Final     Lead Venous Blood 01/19/2022 <2.0  <=4.9 ug/dL Final     ABO/RH(D) 01/19/2022 O POS   Final      Antibody Screen 01/19/2022 Negative  Negative Final     SPECIMEN EXPIRATION DATE 01/19/2022 [id number]   Final     WBC Count 01/19/2022 7.5  4.0 - 11.0 10e3/uL Final     RBC Count 01/19/2022 3.84  3.80 - 5.20 10e6/uL Final     Hemoglobin 01/19/2022 11.2* 11.7 - 15.7 g/dL Final     Hematocrit 01/19/2022 34.5* 35.0 - 47.0 % Final     MCV 01/19/2022 90  78 - 100 fL Final     MCH 01/19/2022 29.2  26.5 - 33.0 pg Final     MCHC 01/19/2022 32.5  31.5 - 36.5 g/dL Final     RDW 01/19/2022 12.9  10.0 - 15.0 % Final     Platelet Count 01/19/2022 136* 150 - 450 10e3/uL Final     % Neutrophils 01/19/2022 78  % Final     % Lymphocytes 01/19/2022 14  % Final     % Monocytes 01/19/2022 7  % Final     % Eosinophils 01/19/2022 1  % Final     % Basophils 01/19/2022 0  % Final     % Immature Granulocytes 01/19/2022 0  % Final     Absolute Neutrophils 01/19/2022 5.8  1.6 - 8.3 10e3/uL Final     Absolute Lymphocytes 01/19/2022 1.1  0.8 - 5.3 10e3/uL Final     Absolute Monocytes 01/19/2022 0.5  0.0 - 1.3 10e3/uL Final     Absolute Eosinophils 01/19/2022 0.0  0.0 - 0.7 10e3/uL Final     Absolute Basophils 01/19/2022 0.0  0.0 - 0.2 10e3/uL Final     Absolute Immature Granulocytes 01/19/2022 0.0  <=0.4 10e3/uL Final     Color Urine 01/19/2022 Yellow  Colorless, Straw, Light Yellow, Yellow Final     Appearance Urine 01/19/2022 Clear  Clear Final     Glucose Urine 01/19/2022 Negative  Negative mg/dL Final     Bilirubin Urine 01/19/2022 Negative  Negative Final     Ketones Urine 01/19/2022 Negative  Negative mg/dL Final     Specific Gravity Urine 01/19/2022 1.025  1.005 - 1.030 Final     Blood Urine 01/19/2022 Negative  Negative Final     pH Urine 01/19/2022 6.0  5.0 - 8.0 Final     Protein Albumin Urine 01/19/2022 Trace* Negative mg/dL Final     Urobilinogen Urine 01/19/2022 0.2  0.2, 1.0 E.U./dL Final     Nitrite Urine 01/19/2022 Negative  Negative Final     Leukocyte Esterase Urine 01/19/2022 Negative  Negative Final     Bacteria Urine  01/19/2022 Few* None Seen /HPF Final     RBC Urine 01/19/2022 0-2  0-2 /HPF /HPF Final     WBC Urine 01/19/2022 0-5  0-5 /HPF /HPF Final     Squamous Epithelials Urine 01/19/2022 Few* None Seen /LPF Final     Mucus Urine 01/19/2022 Present* None Seen /LPF Final        30 minutes spent on the date of the encounter doing chart review, history and exam, and documentation.      Please see OB Episode for complete details.    This dictation uses voice recognition software, which may contain typographical errors.

## 2022-01-25 ENCOUNTER — HOSPITAL ENCOUNTER (OUTPATIENT)
Dept: ULTRASOUND IMAGING | Facility: HOSPITAL | Age: 32
Discharge: HOME | End: 2022-01-25
Attending: PHYSICIAN ASSISTANT | Admitting: PHYSICIAN ASSISTANT
Payer: MEDICAID

## 2022-01-25 DIAGNOSIS — Z3A.11 11 WEEKS GESTATION OF PREGNANCY: ICD-10-CM

## 2022-01-25 PROCEDURE — 76801 OB US < 14 WKS SINGLE FETUS: CPT

## 2022-02-16 ENCOUNTER — PRENATAL OFFICE VISIT (OUTPATIENT)
Dept: FAMILY MEDICINE | Facility: CLINIC | Age: 32
End: 2022-02-16
Payer: COMMERCIAL

## 2022-02-16 VITALS
RESPIRATION RATE: 16 BRPM | HEIGHT: 62 IN | DIASTOLIC BLOOD PRESSURE: 57 MMHG | WEIGHT: 105 LBS | OXYGEN SATURATION: 100 % | SYSTOLIC BLOOD PRESSURE: 89 MMHG | TEMPERATURE: 98.7 F | HEART RATE: 81 BPM | BODY MASS INDEX: 19.32 KG/M2

## 2022-02-16 DIAGNOSIS — D69.6 THROMBOCYTOPENIA (H): ICD-10-CM

## 2022-02-16 DIAGNOSIS — O09.892 HISTORY OF PRETERM DELIVERY, CURRENTLY PREGNANT IN SECOND TRIMESTER: ICD-10-CM

## 2022-02-16 DIAGNOSIS — N89.8 VAGINAL DISCHARGE: ICD-10-CM

## 2022-02-16 DIAGNOSIS — O09.629 ENCOUNTER FOR SUPERVISION OF HIGH-RISK PREGNANCY OF YOUNG MULTIGRAVIDA: Primary | ICD-10-CM

## 2022-02-16 DIAGNOSIS — Z86.16 HISTORY OF 2019 NOVEL CORONAVIRUS DISEASE (COVID-19): ICD-10-CM

## 2022-02-16 LAB
ALBUMIN UR-MCNC: NEGATIVE MG/DL
CLUE CELLS: ABNORMAL
GLUCOSE UR STRIP-MCNC: NEGATIVE MG/DL
KETONES UR STRIP-MCNC: NEGATIVE MG/DL
TRICHOMONAS, WET PREP: ABNORMAL
WBC'S/HIGH POWER FIELD, WET PREP: ABNORMAL
YEAST, WET PREP: ABNORMAL

## 2022-02-16 PROCEDURE — 99207 PR PRENATAL VISIT: CPT | Performed by: FAMILY MEDICINE

## 2022-02-16 PROCEDURE — 81003 URINALYSIS AUTO W/O SCOPE: CPT | Performed by: FAMILY MEDICINE

## 2022-02-16 PROCEDURE — 87210 SMEAR WET MOUNT SALINE/INK: CPT | Performed by: FAMILY MEDICINE

## 2022-02-16 PROCEDURE — 87591 N.GONORRHOEAE DNA AMP PROB: CPT | Performed by: FAMILY MEDICINE

## 2022-02-16 PROCEDURE — 87491 CHLMYD TRACH DNA AMP PROBE: CPT | Performed by: FAMILY MEDICINE

## 2022-02-16 RX ORDER — HYDROXYPROGESTERONE CAPROATE 250 MG/ML
250 INJECTION INTRAMUSCULAR
Status: DISCONTINUED | OUTPATIENT
Start: 2022-03-02 | End: 2022-08-01

## 2022-02-16 NOTE — PATIENT INSTRUCTIONS
BIRTH AND 17-alpha hydroxyprogesterone caproate (17P) TREATMENT    What is  birth?      birth is the delivery of a baby before 37 weeks of gestation.  Approximately 1 in every 12 babies in [place] is born premature (that s approximately 6,000 babies every year)!     birth is often unexpected, but can happen for many reasons. There are some known risk factors, which include:   -pregnancy with twins or triplets   -being  race  -some problems with the uterus or cervix   -some medical problems like high blood pressure   -smoking, drinking, or using illegal drugs while pregnant   -infections like urinary tract infection, bacterial vaginosis and chlamydia while    pregnant  -depression, stress, and anxiety    But the biggest risk factor is having a previous  baby. In fact, women who have one  birth have a 30-50% chance of their next baby coming early too.     What are the risks to a baby that delivers prematurely?     birth is the number one cause of  death worldwide. This risk increases the earlier the baby is born.  Prematurity can also cause serious long term health problems for babies such as breathing problems, infections, bleeding into the brain, as well as long term developmental problems like cerebral palsy, learning impairments, hearing and vision problems.    How can I prevent  birth in my pregnancy?  Overall, the best thing to prevent  delivery is to stay healthy during your pregnancy, and follow up with your doctor for your regular visits and screening tests.  If you have a history of  birth in one of your past pregnancies, you may benefit from weekly injections of 17P.     What is 17P?  The full name is 17-alpha hydroxyprogesterone caproate. This is a form of your body s natural  pregnancy hormone , progesterone. The brand name of this is Mago, and it is FDA approved for prevention of  birth.  This medication  is given in once weekly injections from week 16-20 through the 36th week of pregnancy. Research shows that women taking 17P can reduce their risk of  birth from 50% to 36%. The treatment has also been shown to reduce the risk of complications after birth, such as bleeding in the brain and need for supplemental oxygen.    It is important to complete the treatment once you start, as the risk of  birth goes up if you stop the injections early.    How can I get started on the Mago treatment?  First, you should talk with your doctor to find out if this is a good option for you.  It is safe for pregnant women and for the fetus, but if you have some medical problems like uncontrolled blood pressure, breast cancer, or liver disease you should talk about it with your doctor first.  Your clinic will work with you to help determine insurance coverage and preauthorization if needed.  Then you will schedule weekly visits for 17P injections in addition to your routine prenatal visits with your doctor.    Side Effects of Mekena  The most common side effects  reported by Tolono users are   injection site reactions (pain [35%], swelling  [17%], pruritus (itching) [6%], nodule [5%]), urticaria (rash) (12%), pruritus (generalized itching (8%), nausea (6%), and diarrhea (2%).

## 2022-02-16 NOTE — PROGRESS NOTES
First OB   Had appt with PCP but it got cancelled. And wasn't rescheduled til march? Saw Anita who got her a OB intake appt.   Feeling well.   No Nausea  Good Appetite  No pelvic pain, vaginal bleeding. Having a little discharge. Not itchy or irritating. But seems not her normal. Has to wear a little pad. Last two babies had same    No other concerns      Taking PNV: yes but insurance didn't cover when she went to refill?    Labs/imaging reviewed  Discussed screening for aneuploidy and neural tube defects, SMA and CF.  Declines     Preformed physical exam : see flow sheet  Reviewed WENDY, past medical history, past surgical history, family history, genetic history, and previous obstetrical history.   Encouraged good nutrition, well balanced diet, regular activity.  Discussed foods to avoid.  And other applicable safety/health risks in pregnancy.    Dianne was seen today for prenatal care and medication problem.    Diagnoses and all orders for this visit:    Encounter for supervision of high-risk pregnancy of young multigravida  -     Chlamydia & Gonorrhea by PCR, [facility] - Clinic Collect  -     Urinalysis, OB Screen  -     Prenatal Vit-Fe Fumarate-FA (PNV FOLIC ACID + IRON) 27-1 MG TABS; Take 1 tablet by mouth daily    History of  delivery, currently pregnant in second trimester  Comments:  Reviewed risk/benefits of [place] injections. Differing study outcomes/recommendations. FDA recommendations vs ACOG recommendations. Reviewed recommendations for CL screening. She is agreeable to CL screening but would like to think about [place].   Orders:  -     US OB Transvaginal with Cervical Length; Standing  -     HYDROXYprogesterone caproate (ELLA) intramuscular injection 250 mg    Vaginal discharge  -     Wet prep - lab collect; Future  -     Wet prep - lab collect    Thrombocytopenia (H)  Comments:  likely gestational. monitor    History of 2019 novel coronavirus disease (COVID-19): recommended for level II US.   -  "    Mat Fetal Med Ctr Referral - Pregnancy; Future    Other orders  -     REVIEW OF HEALTH MAINTENANCE PROTOCOL ORDERS      Preeclampsia risk factors:  High risk factors (1+): none  Moderate risk factors (2+): none      Margot Palacios, DO      Evaluation for 17P   Is this current pregnancy a bain pregnancy? Yes  Has this patient experienced a spontaneous,  birth or  rupture of membranes between 20 - 37 weeks of a bain pregnancy? Yes    Both answers are \"Yes\" the patient may be a candidate for \"17P\" M[place].        Assessment:  Dianne is a 31 year old  at 15w6d with a history of prior spontaneous bain  birth.  Given this history, Dianne is at risk for recurrent  birth in this pregnancy.  I discussed the availability of 17-alpha hydroxyprogesterone caproate (17P), which has been demonstrated to reduce the incidence of recurrent  birth and Dianne does meet criteria for weekly 17P administration in this pregnancy.    Patient has no contraindications to 17P.    Informed patient that 17P requires a commitment to weekly injection which should begin before 20+6 weeks and abrupt discontinuation can increase the risk for  birth.  Patient wants to think about proceeding with weekly 17P injections.    Plan:   17P 250mg IM weekly beginning between 16-20 weeks through 36 weeks gestation- PATIENT IS CONSIDERING. Order placed for prior Auth  Serial transvaginal ultrasound for cervical length from 16 through 22-23 weeks gestation  Screen for asymptomatic bacteriuria at first prenatal visit and treat with appropriate antibiotics if present  Smoking risk discussed. Non smoking household.          "

## 2022-02-17 LAB
C TRACH DNA SPEC QL PROBE+SIG AMP: NEGATIVE
N GONORRHOEA DNA SPEC QL NAA+PROBE: NEGATIVE

## 2022-02-18 ENCOUNTER — TELEPHONE (OUTPATIENT)
Dept: FAMILY MEDICINE | Facility: CLINIC | Age: 32
End: 2022-02-18
Payer: COMMERCIAL

## 2022-02-18 DIAGNOSIS — O09.629 ENCOUNTER FOR SUPERVISION OF HIGH-RISK PREGNANCY OF YOUNG MULTIGRAVIDA: Primary | ICD-10-CM

## 2022-02-21 ENCOUNTER — TRANSCRIBE ORDERS (OUTPATIENT)
Dept: MATERNAL FETAL MEDICINE | Facility: HOSPITAL | Age: 32
End: 2022-02-21
Payer: COMMERCIAL

## 2022-02-21 DIAGNOSIS — O26.90 PREGNANCY RELATED CONDITION, ANTEPARTUM: Primary | ICD-10-CM

## 2022-02-21 PROBLEM — Z86.16 HISTORY OF 2019 NOVEL CORONAVIRUS DISEASE (COVID-19): Status: ACTIVE | Noted: 2022-02-21

## 2022-02-24 ENCOUNTER — TELEPHONE (OUTPATIENT)
Dept: FAMILY MEDICINE | Facility: CLINIC | Age: 32
End: 2022-02-24
Payer: COMMERCIAL

## 2022-02-24 NOTE — TELEPHONE ENCOUNTER
Called patient to schedule appt next week had to leave . Okay to DB patient in to Dr. Francis schedule    ----- Message from Khadra Wheeler MD sent at 2/24/2022  8:29 AM CST -----  Please put this patient into see me next week for ob care.  Thanks    Dr. wheeler  ----- Message -----  From: Margot Palacios DO  Sent: 2/21/2022  10:04 AM CST  To: Khadra Wheeler MD    This patient would like to see you for OB. Her appt apparently got cancelled in January and then she wasn't going to be rescheduled until March. So she just didn't schedule and came to [facility]. It sounded like she was just confused.   Anyway her due date is 8/4/22. Let me know if I can get her scheduled with you. Otherwise i'll plan to keep seeing her.     Thanks!  Yakelin

## 2022-02-24 NOTE — TELEPHONE ENCOUNTER
----- Message from Khadra Wheeler MD sent at 2/24/2022  8:29 AM CST -----  Please put this patient into see me next week for ob care.  Thanks    Dr. wheeler  ----- Message -----  From: [place], MargotDO  Sent: 2/21/2022  10:04 AM CST  To: Khadra Wheeler MD    This patient would like to see you for OB. Her appt apparently got cancelled in January and then she wasn't going to be rescheduled until March. So she just didn't schedule and came to [facility]. It sounded like she was just confused.   Anyway her due date is 8/4/22. Let me know if I can get her scheduled with you. Otherwise i'll plan to keep seeing her.     Thanks!  Yakelin

## 2022-02-25 NOTE — TELEPHONE ENCOUNTER
Preeti Leiva  [address] [address]      [birth year] 24, 2020      To whom it may concern,    The above listed person is a patient of mine. She has type 1 diabetes mellitus. She is safe to nelda dive as long as she checks her glucose before the activity.     Sincerely,    Dada Richards MD   PA Initiation    Medication: Prenatal Vit-Fe Fumarate-FA (PNV FOLIC ACID + IRON) 27-1 MG TABS  Insurance Company:    Pharmacy Filling the Rx: [facility] - [place], [place] - [facility] [address]  Filling Pharmacy Phone: [phone]  Filling Pharmacy Fax:    Start Date: 2/25/2022

## 2022-02-27 ENCOUNTER — HOSPITAL ENCOUNTER (OUTPATIENT)
Dept: ULTRASOUND IMAGING | Facility: HOSPITAL | Age: 32
Discharge: HOME | End: 2022-02-27
Attending: FAMILY MEDICINE | Admitting: FAMILY MEDICINE
Payer: COMMERCIAL

## 2022-02-27 DIAGNOSIS — O09.892 HISTORY OF PRETERM DELIVERY, CURRENTLY PREGNANT IN SECOND TRIMESTER: ICD-10-CM

## 2022-02-27 PROCEDURE — 76817 TRANSVAGINAL US OBSTETRIC: CPT

## 2022-02-28 NOTE — TELEPHONE ENCOUNTER
PRIOR AUTHORIZATION DENIED    Medication: Prenatal Vit-Fe Fumarate-FA (PNV FOLIC ACID + IRON) 27-1 MG TABS    Denial Date: 2/25/2022    Denial Rational: PATIENT MUST TRY/FAIL TWO PREFERRED ALTERNATIVES OR PROVIDE DOCUMENTATION REASONING WHY ALTERNATIVES CANNOT BE MEDICALLY USED       Appeal Information:

## 2022-03-01 ENCOUNTER — TELEPHONE (OUTPATIENT)
Dept: FAMILY MEDICINE | Facility: CLINIC | Age: 32
End: 2022-03-01
Payer: COMMERCIAL

## 2022-03-01 RX ORDER — VITAMIN A ACETATE, .BETA.-CAROTENE, ASCORBIC ACID, CHOLECALCIFEROL, .ALPHA.-TOCOPHEROL ACETATE, DL-, THIAMINE MONONITRATE, RIBOFLAVIN, NIACINAMIDE, PYRIDOXINE HYDROCHLORIDE, FOLIC ACID, CYANOCOBALAMIN, CALCIUM CARBONATE, FERROUS FUMARATE, ZINC OXIDE, AND CUPRIC OXIDE 2000; 2000; 120; 400; 22; 1.84; 3; 20; 10; 1; 12; 200; 27; 25; 2 [IU]/1; [IU]/1; MG/1; [IU]/1; MG/1; MG/1; MG/1; MG/1; MG/1; MG/1; UG/1; MG/1; MG/1; MG/1; MG/1
1 TABLET ORAL DAILY
Qty: 90 TABLET | Refills: 1 | Status: SHIPPED | OUTPATIENT
Start: 2022-03-01 | End: 2022-05-02

## 2022-03-01 NOTE — TELEPHONE ENCOUNTER
Formulary alternatives include: Prenatal Multi-DHA 27-0.8-250 capsule   And Prenatal Vitamin PLUS 27mg-1mg.

## 2022-03-01 NOTE — TELEPHONE ENCOUNTER
----- Message from Margot Palacios DO sent at 3/1/2022  8:44 AM CST -----  Cervix is nice and long. She can have this reevaluated with MFM when seeing them in 2 weeks on  3/16 for the ultrasound. She has an appt scheduled with Dr Wheeler and can cancel her appt with me 3/16. Thanks!

## 2022-03-03 NOTE — TELEPHONE ENCOUNTER
The patient verbalizes understanding of provider/CSS instructions for follow-up and continued care per provider message.     Appointment with Dr. Palacios 3/16/22 cancelled per provider request.

## 2022-03-16 ENCOUNTER — LAB (OUTPATIENT)
Dept: LAB | Facility: HOSPITAL | Age: 32
End: 2022-03-16
Attending: FAMILY MEDICINE
Payer: COMMERCIAL

## 2022-03-16 ENCOUNTER — OFFICE VISIT (OUTPATIENT)
Dept: MATERNAL FETAL MEDICINE | Facility: HOSPITAL | Age: 32
End: 2022-03-16
Attending: FAMILY MEDICINE
Payer: COMMERCIAL

## 2022-03-16 ENCOUNTER — ANCILLARY PROCEDURE (OUTPATIENT)
Dept: ULTRASOUND IMAGING | Facility: HOSPITAL | Age: 32
End: 2022-03-16
Attending: FAMILY MEDICINE
Payer: COMMERCIAL

## 2022-03-16 ENCOUNTER — OFFICE VISIT (OUTPATIENT)
Dept: MATERNAL FETAL MEDICINE | Facility: HOSPITAL | Age: 32
End: 2022-03-16
Attending: OBSTETRICS & GYNECOLOGY
Payer: COMMERCIAL

## 2022-03-16 DIAGNOSIS — O28.3 ECHOGENIC INTRACARDIAC FOCUS OF FETUS ON PRENATAL ULTRASOUND: Primary | ICD-10-CM

## 2022-03-16 DIAGNOSIS — O28.3 ECHOGENIC INTRACARDIAC FOCUS OF FETUS ON PRENATAL ULTRASOUND: ICD-10-CM

## 2022-03-16 DIAGNOSIS — Z36.9 ANTENATAL SCREENING ENCOUNTER: ICD-10-CM

## 2022-03-16 DIAGNOSIS — O36.5990 PREGNANCY AFFECTED BY FETAL GROWTH RESTRICTION: Primary | ICD-10-CM

## 2022-03-16 DIAGNOSIS — O26.90 PREGNANCY RELATED CONDITION, ANTEPARTUM: ICD-10-CM

## 2022-03-16 DIAGNOSIS — O28.3 ABNORMAL PRENATAL ULTRASOUND: ICD-10-CM

## 2022-03-16 PROCEDURE — 36415 COLL VENOUS BLD VENIPUNCTURE: CPT

## 2022-03-16 PROCEDURE — 999N000069 HC STATISTIC GENETIC COUNSELING, < 16 MIN

## 2022-03-16 PROCEDURE — 76811 OB US DETAILED SNGL FETUS: CPT

## 2022-03-16 PROCEDURE — 99202 OFFICE O/P NEW SF 15 MIN: CPT | Mod: 25 | Performed by: OBSTETRICS & GYNECOLOGY

## 2022-03-16 PROCEDURE — 76811 OB US DETAILED SNGL FETUS: CPT | Mod: 26 | Performed by: OBSTETRICS & GYNECOLOGY

## 2022-03-16 PROCEDURE — 76817 TRANSVAGINAL US OBSTETRIC: CPT | Mod: 26 | Performed by: OBSTETRICS & GYNECOLOGY

## 2022-03-16 PROCEDURE — 76817 TRANSVAGINAL US OBSTETRIC: CPT

## 2022-03-16 PROCEDURE — 76820 UMBILICAL ARTERY ECHO: CPT | Mod: 26 | Performed by: OBSTETRICS & GYNECOLOGY

## 2022-03-16 NOTE — PROGRESS NOTES
The patient was seen for an ultrasound in the Maternal-Fetal Medicine Center at the [facility] today.  For a detailed report of the ultrasound examination, please see the ultrasound report which can be found under the imaging tab.    Mary Anne Nelson MD  , OB/GYN  Maternal-Fetal Medicine  [phone] (Pager)

## 2022-03-16 NOTE — PROGRESS NOTES
[facility] Maternal Fetal Medicine [place]  Genetic Counseling Consult    Patient: Dianne Ochoa  YOB: 1990    Date of Service: 2022       Dianne Ochoa was seen at [facility] Fetal Medicine [place] at the request of Dr. Nelson for an abbreviated genetic consultation to discuss the options for screening and testing for fetal chromosome abnormalities in light of echogenic intracardiac focus identified on today's ultrasound.  The patient was unaccompanied to today's consult.       Impression/Plan:   1.  Dianne elected to proceed with a blood draw for NIPT (NIPS test through [facility] lab) during today's consult in light of today's finding of echogenic intracardiac focus.  Results are expected within 7-10 days, and will be available in [place].  We will contact her to discuss the results, and a copy will be forwarded to the office of the referring OB provider. In the event we are unable to contact the patient once results become available, she has requested we leave a detailed voicemail fully disclosing the results at her mobile telephone number. The patient does NOT wish to know the predicted genetic sex of the pregnancy, however desires sex chromosome aneuploidy (SCA) analysis. The patient has agreed to include SCA as part of her screening, but has requested that the predicted genetic sex not be shared during results disclosure should SCA analysis come back low risk. If the pregnancy is found to be at high risk for a SCA, the patient has requested we share the predicted genetic sex of the pregnancy with her.     2.   Dianne declines diagnostic prenatal genetic testing (amniocentesis) during today's consult.     3.  Maternal serum AFP (single marker screen) is recommended after 15 weeks to screen for open neural tube defects. A quad screen should not be performed.    Pregnancy History:   /Parity:    Age at Delivery: 32 year old    WENDY: Aug 4, 2022, by Last Menstrual  Period  Gestational Age: 19w6d       Dianne's medical and pregnancy history was not reviewed in detail during today's abbreviated consult.        Risk Assessment for Chromosome Conditions:   We explained that the risk for fetal chromosome abnormalities increases with maternal age. We discussed specific features of common chromosome abnormalities, including Down syndrome, trisomy 13, trisomy 18, and sex chromosome trisomies.      - At age 32 at midtrimester, the risk to have a baby with Down syndrome is 1 in 597.    - At age 32 at midtrimester, the risk to have a baby with any chromosome abnormality is 1 in 299.     We reviewed today's ultrasound finding of echogenic intracardiac focus (EIF), which refers to a small bright spot in the heart. This finding is generally not associated with heart defects or other heart problems and tends to resolve on its own prior to birth or postnatally.     Some studies have suggested that this ultrasound finding is thought to be seen with a higher frequency in pregnancies with a chromosome problem (particularly Down syndrome), as compared to pregnancies that do not have a chromosome problem. Of fetuses with Down syndrome (trisomy 21), approximately 20-28% present with an EIF. Because of this, it is thought that this ultrasound finding may increase the risk of a chromosome problem in a pregnancy. Of fetuses who do not have Down syndrome,approximately 4-7% present with an EIF. Based on a current age related risk of of 1 in 597 (0.16%), the adjusted risk for Dianne lamb pregnancy to be affected with Down syndrome is between 1 in 462 and 1 in 332, or approximately 0.2-0.3%. Conversely there is a >99% chance that the pregnancy does not have Down syndrome.     Additionally, we reviewed that EIFs are more commonly seen in pregnancies with  ancestry (up to 30%). The patient verbalized understanding.        Testing Options:   We discussed the following options:   Non-invasive Prenatal Testing  (NIPT)    Maternal plasma cell-free DNA testing; first trimester ultrasound with nuchal translucency and nasal bone assessment is recommended, when appropriate    Screens for fetal trisomy 21, trisomy 13, trisomy 18, and sex chromosome aneuploidy    Cannot screen for open neural tube defects; maternal serum AFP after 15 weeks is recommended     Genetic Amniocentesis    Invasive procedure typically performed in the second trimester by which amniotic fluid is obtained for the purpose of chromosome analysis and/or other prenatal genetic analysis    Diagnostic results; >99% sensitivity for fetal chromosome abnormalities    AFAFP measurement tests for open neural tube defects    We reviewed the benefits and limitations of this testing.  Screening tests provide a risk assessment specific to the pregnancy for certain fetal chromosome abnormalities, but cannot definitively diagnose or exclude a fetal chromosome abnormality.  Follow-up genetic counseling and consideration of diagnostic testing is recommended with any abnormal screening result.     Diagnostic tests carry inherent risks- including risk of miscarriage- that require careful consideration.  These tests can detect fetal chromosome abnormalities with greater than 99% certainty.  Results can be compromised by maternal cell contamination or mosaicism, and are limited by the resolution of cytogenetic G-banding technology.  There is no screening nor diagnostic test that can detect all forms of birth defects or mental disability.     It was a pleasure to be involved with [facility] care. Face-to-face time of the meeting was 10 minutes.    Jillian Apodaca, [facility], [facility]  Licensed Genetic Counselor  [facility]  Maternal Fetal Medicine  Phone: [phone]  [email or web address]

## 2022-03-21 ENCOUNTER — PRENATAL OFFICE VISIT (OUTPATIENT)
Dept: FAMILY MEDICINE | Facility: CLINIC | Age: 32
End: 2022-03-21
Payer: COMMERCIAL

## 2022-03-21 VITALS
RESPIRATION RATE: 16 BRPM | SYSTOLIC BLOOD PRESSURE: 100 MMHG | WEIGHT: 107.5 LBS | HEART RATE: 93 BPM | OXYGEN SATURATION: 97 % | HEIGHT: 62 IN | DIASTOLIC BLOOD PRESSURE: 50 MMHG | BODY MASS INDEX: 19.78 KG/M2 | TEMPERATURE: 98.4 F

## 2022-03-21 DIAGNOSIS — O09.629 ENCOUNTER FOR SUPERVISION OF HIGH-RISK PREGNANCY OF YOUNG MULTIGRAVIDA: Primary | ICD-10-CM

## 2022-03-21 DIAGNOSIS — K59.00 CONSTIPATION, UNSPECIFIED CONSTIPATION TYPE: ICD-10-CM

## 2022-03-21 DIAGNOSIS — L65.9 HAIR LOSS: ICD-10-CM

## 2022-03-21 DIAGNOSIS — G47.09 OTHER INSOMNIA: ICD-10-CM

## 2022-03-21 LAB — TSH SERPL DL<=0.005 MIU/L-ACNC: 2.22 UIU/ML (ref 0.3–5)

## 2022-03-21 PROCEDURE — 84443 ASSAY THYROID STIM HORMONE: CPT | Performed by: FAMILY MEDICINE

## 2022-03-21 PROCEDURE — 36415 COLL VENOUS BLD VENIPUNCTURE: CPT | Performed by: FAMILY MEDICINE

## 2022-03-21 PROCEDURE — 99207 PR PRENATAL VISIT: CPT | Performed by: FAMILY MEDICINE

## 2022-03-21 RX ORDER — LANOLIN ALCOHOL/MO/W.PET/CERES
3 CREAM (GRAM) TOPICAL
Qty: 90 TABLET | Refills: 3 | Status: SHIPPED | OUTPATIENT
Start: 2022-03-21 | End: 2022-05-02

## 2022-03-21 NOTE — PROGRESS NOTES
"ASSESSMENT/PLAN:   Dianne was seen today for prenatal care.    Diagnoses and all orders for this visit:    Encounter for supervision of high-risk pregnancy of young multigravida  -     TSH with free T4 reflex; Future  -     TSH with free T4 reflex    Other insomnia  -     melatonin 3 MG tablet; Take 1 tablet (3 mg) by mouth nightly as needed for sleep  -     TSH with free T4 reflex; Future  -     TSH with free T4 reflex    Hair loss  -     TSH with free T4 reflex; Future  -     TSH with free T4 reflex    Constipation, unspecified constipation type  -     TSH with free T4 reflex; Future  -     TSH with free T4 reflex    US reviewed today . Has repeat in 3 weeks with MFM.    Concern for FGR, vs constitutionally small baby.    Monitor mom's weight gain.    Reviewed covid protocols in the hospital.    Reviewed melatonin use in pregnancy.  Reviewed other strategies for managing insomnia.  Encouraged her to try those first.      Monitor platelets.          Return in about 4 weeks (around 4/18/2022) for ob.       ======================================================    SUBJECTIVE  Dianne Ochoa is a 31 year old female here for   1.  High risk pregnancy.  :  She is doing okay.  No bleeding, cramping, leaking of water.  No headaches or vision changes.  Her ultrasound was done with MFM that showed echogenic focus on the heart so a cell free DNA test was drawn.  The results of this are still pending.  She was also noted to have fetal growth restriction versus constitutionally small baby.    She has been losing a lot of hair.    She has a lot of constipation.   She has felt very tired.  She has been having trouble with insomnia.      ROS  Complete 10 point review of systems negative except as noted above in HPI      OBJECTIVE  /50 (BP Location: Left arm, Patient Position: Sitting, Cuff Size: Adult Small)   Pulse 93   Temp 98.4  F (36.9  C) (Oral)   Resp 16   Ht 1.575 m (5' 2\")   Wt 48.8 kg (107 lb 8 oz)   LMP 10/28/2021 " (Exact Date)   SpO2 97%   BMI 19.66 kg/m     Gen:  Nad, alert  No thyromegaly or nodules.    Reflexes are normal  No tremor noted  abd soft.  Gravid.    Fundus at umbilicus  No edema.    No tremor noted   Current Outpatient Medications   Medication     melatonin 3 MG tablet     Prenatal MV-Min-Fe Fum-FA-DHA (PRENATAL MULTIVITAMIN PLUS DHA) 27-0.8-250 MG CAPS     Prenatal Vit-Fe Fumarate-FA (PNV FOLIC ACID + IRON) 27-1 MG TABS     Prenatal Vit-Fe Fumarate-FA (PNV PRENATAL PLUS MULTIVITAMIN) 27-1 MG TABS per tablet     Current Facility-Administered Medications   Medication     HYDROXYprogesterone caproate (ELLA) intramuscular injection 250 mg      Patient Active Problem List   Diagnosis     H. pylori infection     Keloid scar     11 weeks gestation of pregnancy     Encounter for supervision of high-risk pregnancy of young multigravida     History of  delivery, currently pregnant in second trimester     Thrombocytopenia (H)     History of 2019 novel coronavirus disease (COVID-19)        LABS & IMAGES   No results found for any visits on 22.      ======================================================    MDM          Options for treatment and follow-up care were reviewed with the patient. Dianne Gabriela and/or guardian was engaged and actively involved in the decision making process. Dianne Gabriela and/or guardian verbalized understanding of the options discussed and was satisfied with the final plan.      Khadra Wheeler MD

## 2022-03-21 NOTE — PATIENT INSTRUCTIONS
Patient Education     Insomnia  Insomnia is repeated difficulty going to sleep or staying asleep, or both. Whether you have insomnia is not defined by a specific amount of sleep. Different people need different amounts of sleep, and you may need more or less sleep at different times of your life.  There are 3 major types of insomnia: short-term, chronic, and  other.  Short-term, or acute insomnia lasts less than 3 months. The symptoms are temporary and can be linked directly to a stressor, such as the death of a loved one, financial problems, or a new physical problem. Short-term insomnia stops when the stressor resolves or the person adapts to its presence.  Chronic insomnia occurs at least 3 times a week and lasts longer than 3 months. Chronic insomnia can occur when either the cause of the sleeping problem is not clear, or the insomnia does not get better when the stressor is resolved. A number of other criteria are also used to make the diagnosis of chronic insomnia.    Other insomnia  is the third type of insomnia-related sleep disorders. This description applies to people who have problems getting to sleep or staying asleep, but don't meet all of the factors that describe either short-term or chronic insomnia.    Many things cause insomnia. Different people may have different causes. It can be from an underlying medical or psychological condition, or lifestyle. It can also be primary insomnia, which means no cause can be found.  Causes of insomnia include:    Chronic medical problems- heart disease, gastrointestinal problems, hormonal changes, breathing problems    Anxiety    Stress    Depression    Pain    Work schedule    Sleep apnea    Illegal drugs    Certain medicines  Many different medicines can affect your sleep, such as stimulants, caffeine, alcohol, some decongestants, and diet pills. Other medicines may include some types of blood pressure pills, steroids, asthma medicines, antihistamines,  antidepressants, seizure medicines and statins. Not all of these will affect your sleep, and they shouldn t be stopped without talking to your doctor.  Symptoms of insomnia can include:    Lying awake for long periods at night before falling asleep    Waking up several times during the night    Waking up early in the morning and not being able to get back to sleep    Feeling tired and not refreshed by sleep    Not being able to function properly during the day and finding it hard to concentrate    Irritability    Tiredness and fatigue during the day  Home care    Review your medicines with your doctor or pharmacist to find out if they can cause insomnia. Not all medicines will affect your sleep, but they shouldn't be stopped without reviewing them with your doctor. There may be serious side effects and consequences from suddenly stopping your medicines. Not taking them may cause strokes, heart attacks, and many other problems.    Caffeine, smoking, and alcohol also affect sleep. Limit your daily use and don't use these before bedtime. Alcohol may make you sleepy at first, but as its effects wear off, you may awaken a few hours later and have trouble returning to sleep.    Don't exercise, eat or drink large amounts of liquid within 2 hours of your bedtime.    Improve your sleep habits. Have a fixed bed and wake-up time. Try to keep noise, light and heat in your bedroom at a comfortable level. Try using earplugs or eyeshades if needed.     Don't watch TV in bed.    If you don't fall asleep within 30 minutes, try to relax by reading or listening to soft music.    Limit daytime napping to one 30 minute period, early in the day.    Get regular exercise. Find other ways to lessen your stress level.    If a medicine was prescribed to help reset your sleep patterns, take it as directed. Sleeping pills are intended for short-term use, only. If taken for too long, the effect wears off while the risk of physical addiction and  psychological dependence increases.  Sleep diary  If the cause isn t obvious and it is not improving, try keeping a  sleep diary  for a couple of weeks. Include in it:    The time you go to bed    How long it takes to fall asleep    How many times you wake up    What time you wake up    Your meal times and what you eat    What time you drink alcohol and caffeine    Your exercise habits and times  Follow-up care  Follow up with your healthcare provider, or as advised. If an X-ray or CT scan was done, you will be notified if there is a change in the reading, especially if it affects treatment.  Call 911  Call 911 if any of these occur:    Trouble breathing    Confusion or trouble waking    Fainting or loss of consciousness    Rapid heart rate    New chest, arm, shoulder, neck or upper back pain    Trouble with speech or vision, weakness of an arm or leg    Trouble walking or talking, loss of balance, numbness or weakness in one side of your body, facial droop  When to seek medical advice  Call your healthcare provider right away if any of these occur:    Extreme restlessness or irritability    Confusion or hallucinations (seeing or hearing things that are not there)    Anxiety, depression    Several days without sleeping  [employer] last reviewed this educational content on 5/1/2018 2000-2021 [employer]. All rights reserved. This information is not intended as a substitute for professional medical care. Always follow your healthcare professional's instructions.

## 2022-03-22 ENCOUNTER — TELEPHONE (OUTPATIENT)
Dept: MATERNAL FETAL MEDICINE | Facility: CLINIC | Age: 32
End: 2022-03-22
Payer: COMMERCIAL

## 2022-03-22 LAB — SCANNED LAB RESULT: NORMAL

## 2022-03-22 NOTE — TELEPHONE ENCOUNTER
March 22, 2022    Had a missed call from Dianne's telephone number. Attempted to return her call and left a voicemail asking her to call back if she has further questions for me.     Jillian Apodaca [facility], [facility]  Licensed Genetic Counselor  [facility]  Maternal Fetal Medicine  Phone: [phone]  [email or web address]

## 2022-03-22 NOTE — TELEPHONE ENCOUNTER
March 22, 2022    Left a message for Dianne regarding her NIPT results. Detailed results were left in Dianne's voicemail per plan established at time of visit.     Results indicate NO ANEUPLOIDY DETECTED for chromosomes 21, 18, 13, or sex chromosomes.     This puts her current pregnancy at low risk for Down syndrome, trisomy 18, trisomy 13 and sex chromosome abnormalities. This test is reported to have the following sensitivities: Down syndrome: 99.99%, trisomy 18: 99.99%, and trisomy 13: 99.99%. Although these results are reassuring, this does not replace a standard chromosome analysis from a chorionic villus sampling or amniocentesis.     Dianne is scheduled to return for follow-up ultrasound to reassess fetal growth on 4/6/2022.     MSAFP is the appropriate second trimester screening test for open neural tube defects; the maternal quad screen is not recommended.    Her results are available in her Epic chart for her primary OB to review.      Jillian Apodaca, [facility], [facility]  Licensed Genetic Counselor  [facility]  Maternal Fetal Medicine  Phone: [phone]  [email or web address]

## 2022-04-06 ENCOUNTER — ANCILLARY PROCEDURE (OUTPATIENT)
Dept: ULTRASOUND IMAGING | Facility: HOSPITAL | Age: 32
End: 2022-04-06
Attending: OBSTETRICS & GYNECOLOGY
Payer: COMMERCIAL

## 2022-04-06 ENCOUNTER — OFFICE VISIT (OUTPATIENT)
Dept: MATERNAL FETAL MEDICINE | Facility: HOSPITAL | Age: 32
End: 2022-04-06
Attending: OBSTETRICS & GYNECOLOGY
Payer: COMMERCIAL

## 2022-04-06 VITALS — SYSTOLIC BLOOD PRESSURE: 96 MMHG | DIASTOLIC BLOOD PRESSURE: 61 MMHG | HEART RATE: 78 BPM

## 2022-04-06 DIAGNOSIS — O36.5990 PREGNANCY AFFECTED BY FETAL GROWTH RESTRICTION: Primary | ICD-10-CM

## 2022-04-06 DIAGNOSIS — U07.1 COVID-19 AFFECTING PREGNANCY IN SECOND TRIMESTER: ICD-10-CM

## 2022-04-06 DIAGNOSIS — O98.512 COVID-19 AFFECTING PREGNANCY IN SECOND TRIMESTER: ICD-10-CM

## 2022-04-06 DIAGNOSIS — O09.219 PREVIOUS PRETERM DELIVERY, ANTEPARTUM: ICD-10-CM

## 2022-04-06 DIAGNOSIS — O36.5990 PREGNANCY AFFECTED BY FETAL GROWTH RESTRICTION: ICD-10-CM

## 2022-04-06 PROCEDURE — 76816 OB US FOLLOW-UP PER FETUS: CPT | Mod: 26 | Performed by: OBSTETRICS & GYNECOLOGY

## 2022-04-06 PROCEDURE — 76820 UMBILICAL ARTERY ECHO: CPT | Mod: 26 | Performed by: OBSTETRICS & GYNECOLOGY

## 2022-04-06 PROCEDURE — 76820 UMBILICAL ARTERY ECHO: CPT

## 2022-04-06 PROCEDURE — 99207 PR NO CHARGE LOS: CPT | Performed by: OBSTETRICS & GYNECOLOGY

## 2022-04-06 NOTE — PROGRESS NOTES
"Please see \"Imaging\" tab under Chart Review for full details.    Michelle Richmond MD  Maternal Fetal Medicine    "

## 2022-04-14 ENCOUNTER — OFFICE VISIT (OUTPATIENT)
Dept: MATERNAL FETAL MEDICINE | Facility: HOSPITAL | Age: 32
End: 2022-04-14
Attending: OBSTETRICS & GYNECOLOGY
Payer: COMMERCIAL

## 2022-04-14 ENCOUNTER — ANCILLARY PROCEDURE (OUTPATIENT)
Dept: ULTRASOUND IMAGING | Facility: HOSPITAL | Age: 32
End: 2022-04-14
Attending: OBSTETRICS & GYNECOLOGY
Payer: COMMERCIAL

## 2022-04-14 DIAGNOSIS — O09.219 PREVIOUS PRETERM DELIVERY, ANTEPARTUM: ICD-10-CM

## 2022-04-14 DIAGNOSIS — O36.5990 PREGNANCY AFFECTED BY FETAL GROWTH RESTRICTION: Primary | ICD-10-CM

## 2022-04-14 DIAGNOSIS — O98.512 COVID-19 AFFECTING PREGNANCY IN SECOND TRIMESTER: ICD-10-CM

## 2022-04-14 DIAGNOSIS — O36.5990 PREGNANCY AFFECTED BY FETAL GROWTH RESTRICTION: ICD-10-CM

## 2022-04-14 DIAGNOSIS — U07.1 COVID-19 AFFECTING PREGNANCY IN SECOND TRIMESTER: ICD-10-CM

## 2022-04-14 PROCEDURE — 59025 FETAL NON-STRESS TEST: CPT | Mod: 26 | Performed by: OBSTETRICS & GYNECOLOGY

## 2022-04-14 PROCEDURE — 76815 OB US LIMITED FETUS(S): CPT | Mod: 26 | Performed by: OBSTETRICS & GYNECOLOGY

## 2022-04-14 PROCEDURE — 76820 UMBILICAL ARTERY ECHO: CPT | Mod: 26 | Performed by: OBSTETRICS & GYNECOLOGY

## 2022-04-14 PROCEDURE — 59025 FETAL NON-STRESS TEST: CPT

## 2022-04-14 PROCEDURE — 99207 PR NO CHARGE LOS: CPT | Performed by: OBSTETRICS & GYNECOLOGY

## 2022-04-14 PROCEDURE — 76815 OB US LIMITED FETUS(S): CPT

## 2022-04-14 NOTE — PROGRESS NOTES
"Please see \"Imaging\" tab under \"Chart Review\" for details of today's visit.    Christina Reedre    "

## 2022-04-21 ENCOUNTER — OFFICE VISIT (OUTPATIENT)
Dept: MATERNAL FETAL MEDICINE | Facility: HOSPITAL | Age: 32
End: 2022-04-21
Attending: OBSTETRICS & GYNECOLOGY
Payer: COMMERCIAL

## 2022-04-21 ENCOUNTER — ANCILLARY PROCEDURE (OUTPATIENT)
Dept: ULTRASOUND IMAGING | Facility: HOSPITAL | Age: 32
End: 2022-04-21
Attending: OBSTETRICS & GYNECOLOGY
Payer: COMMERCIAL

## 2022-04-21 DIAGNOSIS — U07.1 COVID-19 AFFECTING PREGNANCY IN SECOND TRIMESTER: ICD-10-CM

## 2022-04-21 DIAGNOSIS — O98.512 COVID-19 AFFECTING PREGNANCY IN SECOND TRIMESTER: ICD-10-CM

## 2022-04-21 DIAGNOSIS — O36.5990 PREGNANCY AFFECTED BY FETAL GROWTH RESTRICTION: ICD-10-CM

## 2022-04-21 DIAGNOSIS — O09.219 PREVIOUS PRETERM DELIVERY, ANTEPARTUM: ICD-10-CM

## 2022-04-21 PROCEDURE — 76820 UMBILICAL ARTERY ECHO: CPT | Mod: 26 | Performed by: OBSTETRICS & GYNECOLOGY

## 2022-04-21 PROCEDURE — 59025 FETAL NON-STRESS TEST: CPT | Mod: 26 | Performed by: OBSTETRICS & GYNECOLOGY

## 2022-04-21 PROCEDURE — 76815 OB US LIMITED FETUS(S): CPT

## 2022-04-21 PROCEDURE — 76815 OB US LIMITED FETUS(S): CPT | Mod: 26 | Performed by: OBSTETRICS & GYNECOLOGY

## 2022-04-21 PROCEDURE — 99207 PR NO CHARGE LOS: CPT | Performed by: OBSTETRICS & GYNECOLOGY

## 2022-04-21 PROCEDURE — 59025 FETAL NON-STRESS TEST: CPT

## 2022-05-02 ENCOUNTER — PRENATAL OFFICE VISIT (OUTPATIENT)
Dept: FAMILY MEDICINE | Facility: CLINIC | Age: 32
End: 2022-05-02
Payer: COMMERCIAL

## 2022-05-02 VITALS
BODY MASS INDEX: 20.7 KG/M2 | TEMPERATURE: 97.9 F | HEART RATE: 87 BPM | HEIGHT: 62 IN | DIASTOLIC BLOOD PRESSURE: 58 MMHG | SYSTOLIC BLOOD PRESSURE: 100 MMHG | RESPIRATION RATE: 20 BRPM | OXYGEN SATURATION: 98 % | WEIGHT: 112.5 LBS

## 2022-05-02 DIAGNOSIS — Z12.4 CERVICAL CANCER SCREENING: Primary | ICD-10-CM

## 2022-05-02 PROCEDURE — 99207 PR PRENATAL VISIT: CPT | Performed by: FAMILY MEDICINE

## 2022-05-02 NOTE — PROGRESS NOTES
She is doing well.  Denies any bleeding, cramping ,or lof.  Baby is moving well.    Denies any headaches or vision changes.    Denies any lower extremity edema.    Denies any ruq pain.  Denies itching.   She is eating well.  Encouraged another snack.  Monitor weight gain.   Still seeing MFM for poor growth.  Has another US scheduled.    Reviewed baby cares including skin to skin, delayed cord clamping, encouraged breastfeeding, vitamin k, hepatitis b vaccine, eye ointment, 24 hour testing  Needs 1 hour glucose at next visit.  Needs uai.

## 2022-05-06 ENCOUNTER — ANCILLARY PROCEDURE (OUTPATIENT)
Dept: ULTRASOUND IMAGING | Facility: HOSPITAL | Age: 32
End: 2022-05-06
Attending: OBSTETRICS & GYNECOLOGY
Payer: COMMERCIAL

## 2022-05-06 ENCOUNTER — OFFICE VISIT (OUTPATIENT)
Dept: MATERNAL FETAL MEDICINE | Facility: HOSPITAL | Age: 32
End: 2022-05-06
Attending: OBSTETRICS & GYNECOLOGY
Payer: COMMERCIAL

## 2022-05-06 DIAGNOSIS — O09.219 PREVIOUS PRETERM DELIVERY, ANTEPARTUM: ICD-10-CM

## 2022-05-06 DIAGNOSIS — U07.1 COVID-19 AFFECTING PREGNANCY IN SECOND TRIMESTER: ICD-10-CM

## 2022-05-06 DIAGNOSIS — O36.5990 PREGNANCY AFFECTED BY FETAL GROWTH RESTRICTION: ICD-10-CM

## 2022-05-06 DIAGNOSIS — O98.512 COVID-19 AFFECTING PREGNANCY IN SECOND TRIMESTER: ICD-10-CM

## 2022-05-06 DIAGNOSIS — O36.5990 PREGNANCY AFFECTED BY FETAL GROWTH RESTRICTION: Primary | ICD-10-CM

## 2022-05-06 PROCEDURE — 76820 UMBILICAL ARTERY ECHO: CPT | Mod: 26 | Performed by: OBSTETRICS & GYNECOLOGY

## 2022-05-06 PROCEDURE — 99207 PR NO CHARGE LOS: CPT | Performed by: OBSTETRICS & GYNECOLOGY

## 2022-05-06 PROCEDURE — 76816 OB US FOLLOW-UP PER FETUS: CPT | Mod: 26 | Performed by: OBSTETRICS & GYNECOLOGY

## 2022-05-06 PROCEDURE — 76816 OB US FOLLOW-UP PER FETUS: CPT

## 2022-05-06 PROCEDURE — 59025 FETAL NON-STRESS TEST: CPT

## 2022-05-06 PROCEDURE — 59025 FETAL NON-STRESS TEST: CPT | Mod: 26 | Performed by: OBSTETRICS & GYNECOLOGY

## 2022-05-06 NOTE — PROGRESS NOTES
Dianne Ochoa was seen for an ultrasound today at the [facility]etal Medicine center.      For the details of the ultrasound please see the report which can be found under the imaging tab.      Juliana Phillips MD  , OB/GYN  Maternal-Fetal Medicine  [email or web address]  [phone] (Main M Office)  [id number] or [phone] (for 24 hour MFM questions)  [phone] (Pager)

## 2022-05-13 ENCOUNTER — ANCILLARY PROCEDURE (OUTPATIENT)
Dept: ULTRASOUND IMAGING | Facility: HOSPITAL | Age: 32
End: 2022-05-13
Attending: OBSTETRICS & GYNECOLOGY
Payer: COMMERCIAL

## 2022-05-13 ENCOUNTER — OFFICE VISIT (OUTPATIENT)
Dept: MATERNAL FETAL MEDICINE | Facility: HOSPITAL | Age: 32
End: 2022-05-13
Attending: OBSTETRICS & GYNECOLOGY
Payer: COMMERCIAL

## 2022-05-13 DIAGNOSIS — O36.5990 PREGNANCY AFFECTED BY FETAL GROWTH RESTRICTION: ICD-10-CM

## 2022-05-13 DIAGNOSIS — O36.5990 PREGNANCY AFFECTED BY FETAL GROWTH RESTRICTION: Primary | ICD-10-CM

## 2022-05-13 PROCEDURE — 76815 OB US LIMITED FETUS(S): CPT

## 2022-05-13 PROCEDURE — 99207 PR NO CHARGE LOS: CPT | Performed by: OBSTETRICS & GYNECOLOGY

## 2022-05-13 PROCEDURE — 76815 OB US LIMITED FETUS(S): CPT | Mod: 26 | Performed by: OBSTETRICS & GYNECOLOGY

## 2022-05-13 PROCEDURE — 59025 FETAL NON-STRESS TEST: CPT | Mod: 26 | Performed by: OBSTETRICS & GYNECOLOGY

## 2022-05-13 PROCEDURE — 59025 FETAL NON-STRESS TEST: CPT

## 2022-05-13 PROCEDURE — 76820 UMBILICAL ARTERY ECHO: CPT | Mod: 26 | Performed by: OBSTETRICS & GYNECOLOGY

## 2022-05-13 NOTE — PROGRESS NOTES
"Please see \"Imaging\" tab under \"Chart Review\" for details of today's visit.    Christina Reeder    "

## 2022-05-19 ENCOUNTER — OFFICE VISIT (OUTPATIENT)
Dept: MATERNAL FETAL MEDICINE | Facility: HOSPITAL | Age: 32
End: 2022-05-19
Attending: OBSTETRICS & GYNECOLOGY
Payer: COMMERCIAL

## 2022-05-19 ENCOUNTER — ANCILLARY PROCEDURE (OUTPATIENT)
Dept: ULTRASOUND IMAGING | Facility: HOSPITAL | Age: 32
End: 2022-05-19
Attending: OBSTETRICS & GYNECOLOGY
Payer: COMMERCIAL

## 2022-05-19 DIAGNOSIS — U07.1 COVID-19 AFFECTING PREGNANCY IN SECOND TRIMESTER: ICD-10-CM

## 2022-05-19 DIAGNOSIS — O36.5990 PREGNANCY AFFECTED BY FETAL GROWTH RESTRICTION: ICD-10-CM

## 2022-05-19 DIAGNOSIS — O98.512 COVID-19 AFFECTING PREGNANCY IN SECOND TRIMESTER: ICD-10-CM

## 2022-05-19 PROCEDURE — 76815 OB US LIMITED FETUS(S): CPT | Mod: 26 | Performed by: OBSTETRICS & GYNECOLOGY

## 2022-05-19 PROCEDURE — 76820 UMBILICAL ARTERY ECHO: CPT | Mod: 26 | Performed by: OBSTETRICS & GYNECOLOGY

## 2022-05-19 PROCEDURE — 76820 UMBILICAL ARTERY ECHO: CPT

## 2022-05-19 PROCEDURE — 99207 PR NO CHARGE LOS: CPT | Performed by: OBSTETRICS & GYNECOLOGY

## 2022-05-19 PROCEDURE — 59025 FETAL NON-STRESS TEST: CPT | Mod: 26 | Performed by: OBSTETRICS & GYNECOLOGY

## 2022-05-19 PROCEDURE — 59025 FETAL NON-STRESS TEST: CPT

## 2022-05-19 NOTE — PROGRESS NOTES
Please see the imaging tab for details of the ultrasound performed today.    Macarena Pineda MD  Specialist in Maternal-Fetal Medicine

## 2022-05-25 ENCOUNTER — TRANSCRIBE ORDERS (OUTPATIENT)
Dept: MATERNAL FETAL MEDICINE | Facility: HOSPITAL | Age: 32
End: 2022-05-25
Payer: COMMERCIAL

## 2022-05-25 DIAGNOSIS — O26.90 PREGNANCY RELATED CONDITION, ANTEPARTUM: Primary | ICD-10-CM

## 2022-05-25 DIAGNOSIS — O36.5990 PREGNANCY AFFECTED BY FETAL GROWTH RESTRICTION: Primary | ICD-10-CM

## 2022-05-26 ENCOUNTER — ANCILLARY PROCEDURE (OUTPATIENT)
Dept: ULTRASOUND IMAGING | Facility: HOSPITAL | Age: 32
End: 2022-05-26
Attending: OBSTETRICS & GYNECOLOGY
Payer: COMMERCIAL

## 2022-05-26 ENCOUNTER — OFFICE VISIT (OUTPATIENT)
Dept: MATERNAL FETAL MEDICINE | Facility: HOSPITAL | Age: 32
End: 2022-05-26
Attending: FAMILY MEDICINE
Payer: COMMERCIAL

## 2022-05-26 DIAGNOSIS — O36.5990 PREGNANCY AFFECTED BY FETAL GROWTH RESTRICTION: ICD-10-CM

## 2022-05-26 DIAGNOSIS — O26.90 PREGNANCY RELATED CONDITION, ANTEPARTUM: ICD-10-CM

## 2022-05-26 PROCEDURE — 76816 OB US FOLLOW-UP PER FETUS: CPT

## 2022-05-26 PROCEDURE — 76820 UMBILICAL ARTERY ECHO: CPT | Mod: 26 | Performed by: OBSTETRICS & GYNECOLOGY

## 2022-05-26 PROCEDURE — 99207 PR NO CHARGE LOS: CPT | Performed by: OBSTETRICS & GYNECOLOGY

## 2022-05-26 PROCEDURE — 59025 FETAL NON-STRESS TEST: CPT | Mod: 26 | Performed by: OBSTETRICS & GYNECOLOGY

## 2022-05-26 PROCEDURE — 76816 OB US FOLLOW-UP PER FETUS: CPT | Mod: 26 | Performed by: OBSTETRICS & GYNECOLOGY

## 2022-05-26 PROCEDURE — 59025 FETAL NON-STRESS TEST: CPT

## 2022-06-02 ENCOUNTER — PRENATAL OFFICE VISIT (OUTPATIENT)
Dept: FAMILY MEDICINE | Facility: CLINIC | Age: 32
End: 2022-06-02
Payer: COMMERCIAL

## 2022-06-02 VITALS
BODY MASS INDEX: 21.19 KG/M2 | HEIGHT: 62 IN | HEART RATE: 81 BPM | WEIGHT: 115.12 LBS | SYSTOLIC BLOOD PRESSURE: 100 MMHG | RESPIRATION RATE: 14 BRPM | DIASTOLIC BLOOD PRESSURE: 54 MMHG | TEMPERATURE: 97.9 F | OXYGEN SATURATION: 99 %

## 2022-06-02 DIAGNOSIS — O09.629 ENCOUNTER FOR SUPERVISION OF HIGH-RISK PREGNANCY OF YOUNG MULTIGRAVIDA: Primary | ICD-10-CM

## 2022-06-02 DIAGNOSIS — L91.0 KELOID SCAR: ICD-10-CM

## 2022-06-02 DIAGNOSIS — M53.3 SI (SACROILIAC) JOINT DYSFUNCTION: ICD-10-CM

## 2022-06-02 DIAGNOSIS — R49.1 LOST VOICE: ICD-10-CM

## 2022-06-02 DIAGNOSIS — D69.6 THROMBOCYTOPENIA (H): ICD-10-CM

## 2022-06-02 LAB
ALBUMIN UR-MCNC: NEGATIVE MG/DL
APPEARANCE UR: CLEAR
BACTERIA #/AREA URNS HPF: ABNORMAL /HPF
BILIRUB UR QL STRIP: NEGATIVE
COLOR UR AUTO: YELLOW
GLUCOSE 1H P 50 G GLC PO SERPL-MCNC: 133 MG/DL (ref 70–129)
GLUCOSE UR STRIP-MCNC: NEGATIVE MG/DL
HGB BLD-MCNC: 10.9 G/DL (ref 11.7–15.7)
HGB UR QL STRIP: NEGATIVE
KETONES UR STRIP-MCNC: NEGATIVE MG/DL
LEUKOCYTE ESTERASE UR QL STRIP: ABNORMAL
NITRATE UR QL: NEGATIVE
PH UR STRIP: 7 [PH] (ref 5–7)
PLATELET # BLD AUTO: 147 10E3/UL (ref 150–450)
RBC #/AREA URNS AUTO: ABNORMAL /HPF
SP GR UR STRIP: 1.02 (ref 1–1.03)
SQUAMOUS #/AREA URNS AUTO: ABNORMAL /LPF
TRANS CELLS #/AREA URNS HPF: ABNORMAL /HPF
UROBILINOGEN UR STRIP-ACNC: 0.2 E.U./DL
WBC #/AREA URNS AUTO: ABNORMAL /HPF

## 2022-06-02 PROCEDURE — 90715 TDAP VACCINE 7 YRS/> IM: CPT | Performed by: FAMILY MEDICINE

## 2022-06-02 PROCEDURE — 85049 AUTOMATED PLATELET COUNT: CPT | Performed by: FAMILY MEDICINE

## 2022-06-02 PROCEDURE — 86780 TREPONEMA PALLIDUM: CPT | Performed by: FAMILY MEDICINE

## 2022-06-02 PROCEDURE — 90471 IMMUNIZATION ADMIN: CPT | Performed by: FAMILY MEDICINE

## 2022-06-02 PROCEDURE — 85018 HEMOGLOBIN: CPT | Performed by: FAMILY MEDICINE

## 2022-06-02 PROCEDURE — 36415 COLL VENOUS BLD VENIPUNCTURE: CPT | Performed by: FAMILY MEDICINE

## 2022-06-02 PROCEDURE — 87086 URINE CULTURE/COLONY COUNT: CPT | Performed by: FAMILY MEDICINE

## 2022-06-02 PROCEDURE — 99213 OFFICE O/P EST LOW 20 MIN: CPT | Mod: 25 | Performed by: FAMILY MEDICINE

## 2022-06-02 PROCEDURE — 82950 GLUCOSE TEST: CPT | Performed by: FAMILY MEDICINE

## 2022-06-02 PROCEDURE — U0005 INFEC AGEN DETEC AMPLI PROBE: HCPCS | Performed by: FAMILY MEDICINE

## 2022-06-02 PROCEDURE — U0003 INFECTIOUS AGENT DETECTION BY NUCLEIC ACID (DNA OR RNA); SEVERE ACUTE RESPIRATORY SYNDROME CORONAVIRUS 2 (SARS-COV-2) (CORONAVIRUS DISEASE [COVID-19]), AMPLIFIED PROBE TECHNIQUE, MAKING USE OF HIGH THROUGHPUT TECHNOLOGIES AS DESCRIBED BY CMS-2020-01-R: HCPCS | Performed by: FAMILY MEDICINE

## 2022-06-02 PROCEDURE — 99207 PR PRENATAL VISIT: CPT | Performed by: FAMILY MEDICINE

## 2022-06-02 PROCEDURE — 81001 URINALYSIS AUTO W/SCOPE: CPT | Performed by: FAMILY MEDICINE

## 2022-06-02 RX ORDER — BENZOCAINE/MENTHOL 6 MG-10 MG
LOZENGE MUCOUS MEMBRANE 2 TIMES DAILY
Qty: 15 G | Refills: 0 | Status: SHIPPED | OUTPATIENT
Start: 2022-06-02 | End: 2022-07-19

## 2022-06-02 NOTE — PROGRESS NOTES
"She is doing well.  Denies any bleeding, cramping ,or lof.  Baby is moving well.    Denies any headaches or vision changes.    Denies any lower extremity edema.    Denies any ruq pain.  Denies itching. .  Every 2 weeks or so she has some cramping in her low abdomen.  It resolves after 5 minutes.  She has not noticed anything that brings it on.    Has had loss of voice since she talked a lot very loudly on Saturday.  No other sx.  No sore throat.  No ill contacts.  Was helping her family on Saturday.  Is eating well.    Most recent US demonstrated good growth, but ongoing IUGR.  Has repeat US today.    Has some pain in right buttock with rolling over and getting out of the car.  Makes it hard to lay on her right side.      /54   Pulse 81   Temp 97.9  F (36.6  C) (Oral)   Resp 14   Ht 1.575 m (5' 2.01\")   Wt 52.2 kg (115 lb 1.9 oz)   LMP 10/28/2021 (Exact Date)   SpO2 99%   BMI 21.05 kg/m    Gen;  Nad, alert  Normal rom in neck.    No labored breathing.   Skin:  1.5cm keloid over lower back that pt says is very itchy.      A/p:    1. Encounter for supervision of high-risk pregnancy of young multigravida  Continue with MPP.    - UA with Microscopic reflex to Culture - Clinic Collect  - Urine Microscopic  - Urine Culture  - Glucose tolerance gest screen 1 hour  - Hemoglobin  - Treponema Abs w Reflex to RPR and Titer  - TDAP (ADACEL,BOOSTRIX)    2. Lost voice  Warm salt water gargles, honey, voice rest encouraged.    - Asymptomatic COVID-19 Virus (Coronavirus) by PCR; Future  - Asymptomatic COVID-19 Virus (Coronavirus) by PCR Nose    3. SI (sacroiliac) joint dysfunction  Reviewed options to make this more comfortable.  Encouraged PT after delivery .     4. Thrombocytopenia (H)  Monitor platelets.  They are stable for now.  Recheck at delivery.    - Platelet count; Future  - Platelet count    5. Keloid scar  Consider tx post delivery.    Start hydrocortisone for itching for now.    - hydrocortisone (CORTAID) " 1 % external cream; Apply topically 2 times daily  Dispense: 15 g; Refill: 0    6. Encounter for care and examination of lactating mother    - Breast Pump Order for DME - ONLY FOR DME

## 2022-06-03 LAB
SARS-COV-2 RNA RESP QL NAA+PROBE: NEGATIVE
T PALLIDUM AB SER QL: NONREACTIVE

## 2022-06-04 LAB — BACTERIA UR CULT: NO GROWTH

## 2022-06-08 DIAGNOSIS — O09.629 ENCOUNTER FOR SUPERVISION OF HIGH-RISK PREGNANCY OF YOUNG MULTIGRAVIDA: Primary | ICD-10-CM

## 2022-06-08 RX ORDER — FERROUS SULFATE 325(65) MG
325 TABLET ORAL
Qty: 90 TABLET | Refills: 3 | Status: SHIPPED | OUTPATIENT
Start: 2022-06-08 | End: 2022-10-12

## 2022-06-09 ENCOUNTER — TELEPHONE (OUTPATIENT)
Dept: FAMILY MEDICINE | Facility: CLINIC | Age: 32
End: 2022-06-09
Payer: COMMERCIAL

## 2022-06-09 ENCOUNTER — ANCILLARY PROCEDURE (OUTPATIENT)
Dept: ULTRASOUND IMAGING | Facility: HOSPITAL | Age: 32
End: 2022-06-09
Attending: OBSTETRICS & GYNECOLOGY
Payer: COMMERCIAL

## 2022-06-09 ENCOUNTER — OFFICE VISIT (OUTPATIENT)
Dept: MATERNAL FETAL MEDICINE | Facility: HOSPITAL | Age: 32
End: 2022-06-09
Attending: OBSTETRICS & GYNECOLOGY
Payer: COMMERCIAL

## 2022-06-09 ENCOUNTER — TRANSFERRED RECORDS (OUTPATIENT)
Dept: HEALTH INFORMATION MANAGEMENT | Facility: CLINIC | Age: 32
End: 2022-06-09

## 2022-06-09 DIAGNOSIS — O36.5990 PREGNANCY AFFECTED BY FETAL GROWTH RESTRICTION: Primary | ICD-10-CM

## 2022-06-09 DIAGNOSIS — O36.5990 PREGNANCY AFFECTED BY FETAL GROWTH RESTRICTION: ICD-10-CM

## 2022-06-09 PROCEDURE — 76815 OB US LIMITED FETUS(S): CPT

## 2022-06-09 PROCEDURE — 76820 UMBILICAL ARTERY ECHO: CPT | Mod: 26 | Performed by: OBSTETRICS & GYNECOLOGY

## 2022-06-09 PROCEDURE — 99207 PR NO CHARGE LOS: CPT | Performed by: OBSTETRICS & GYNECOLOGY

## 2022-06-09 PROCEDURE — 76820 UMBILICAL ARTERY ECHO: CPT | Mod: 59

## 2022-06-09 PROCEDURE — 59025 FETAL NON-STRESS TEST: CPT

## 2022-06-09 PROCEDURE — 76815 OB US LIMITED FETUS(S): CPT | Mod: 26 | Performed by: OBSTETRICS & GYNECOLOGY

## 2022-06-09 PROCEDURE — 59025 FETAL NON-STRESS TEST: CPT | Mod: 26 | Performed by: OBSTETRICS & GYNECOLOGY

## 2022-06-09 NOTE — PROGRESS NOTES
"Please see \"Imaging\" tab under \"Chart Review\" for details of today's US.    Gwen Menendez, DO    "

## 2022-06-09 NOTE — TELEPHONE ENCOUNTER
Reason for Call:  Other returning call    Detailed comments: Pt needs to be scheduled for 3 hr GTT.  Scheduled pt for 6/10/22 at 745 am.  Pt understands needs to fast and remain for 3 hours.       Call taken on 6/9/2022 at 1:40 PM by Dayami Godwin CMA. TC

## 2022-06-09 NOTE — NURSING NOTE
NST Performed due to fetal growth restriction.  Dr. Menendez reviewed efm tracing. See NST/BPP Doc Flowsheet tab.

## 2022-06-10 ENCOUNTER — LAB (OUTPATIENT)
Dept: LAB | Facility: CLINIC | Age: 32
End: 2022-06-10
Payer: COMMERCIAL

## 2022-06-10 DIAGNOSIS — O09.629 ENCOUNTER FOR SUPERVISION OF HIGH-RISK PREGNANCY OF YOUNG MULTIGRAVIDA: ICD-10-CM

## 2022-06-10 LAB
GESTATIONAL GTT 1 HR POST DOSE: 165 MG/DL (ref 60–179)
GESTATIONAL GTT 2 HR POST DOSE: 132 MG/DL (ref 60–154)
GESTATIONAL GTT 3 HR POST DOSE: 111 MG/DL (ref 60–139)
GLUCOSE P FAST SERPL-MCNC: 66 MG/DL (ref 60–94)

## 2022-06-10 PROCEDURE — 82951 GLUCOSE TOLERANCE TEST (GTT): CPT

## 2022-06-10 PROCEDURE — 36415 COLL VENOUS BLD VENIPUNCTURE: CPT

## 2022-06-10 PROCEDURE — 82952 GTT-ADDED SAMPLES: CPT

## 2022-06-16 ENCOUNTER — PRENATAL OFFICE VISIT (OUTPATIENT)
Dept: FAMILY MEDICINE | Facility: CLINIC | Age: 32
End: 2022-06-16
Payer: COMMERCIAL

## 2022-06-16 VITALS
BODY MASS INDEX: 21.62 KG/M2 | RESPIRATION RATE: 16 BRPM | OXYGEN SATURATION: 99 % | HEART RATE: 95 BPM | DIASTOLIC BLOOD PRESSURE: 56 MMHG | HEIGHT: 62 IN | TEMPERATURE: 98.3 F | WEIGHT: 117.5 LBS | SYSTOLIC BLOOD PRESSURE: 86 MMHG

## 2022-06-16 DIAGNOSIS — O09.629 ENCOUNTER FOR SUPERVISION OF HIGH-RISK PREGNANCY OF YOUNG MULTIGRAVIDA: Primary | ICD-10-CM

## 2022-06-16 PROCEDURE — 99207 PR PRENATAL VISIT: CPT | Performed by: FAMILY MEDICINE

## 2022-06-16 NOTE — PROGRESS NOTES
Has occasional discomfort over her abdomen,  Only happens for 5 minutes.  It is very mild . It doesn't happen daily .   Ongoing tests with [facility], growth ultrasound.    She hasn't eaten anything yet today . Mostly has 2-3 meals, with a single snack.    She is doing well.  Denies any bleeding, cramping ,or lof.  Baby is moving well.    Denies any headaches or vision changes.    Denies any lower extremity edema.    Denies any ruq pain.  Denies itching. .  Reviewed stable platelet count.  Will need to repeat at the hospital.    Reviewed signs of labor  Reviewed carseat.    Reviewed when to go to the hospital in labor.

## 2022-06-17 ENCOUNTER — OFFICE VISIT (OUTPATIENT)
Dept: MATERNAL FETAL MEDICINE | Facility: HOSPITAL | Age: 32
End: 2022-06-17
Attending: OBSTETRICS & GYNECOLOGY
Payer: COMMERCIAL

## 2022-06-17 ENCOUNTER — ANCILLARY PROCEDURE (OUTPATIENT)
Dept: ULTRASOUND IMAGING | Facility: HOSPITAL | Age: 32
End: 2022-06-17
Attending: OBSTETRICS & GYNECOLOGY
Payer: COMMERCIAL

## 2022-06-17 DIAGNOSIS — O36.5990 PREGNANCY AFFECTED BY FETAL GROWTH RESTRICTION: Primary | ICD-10-CM

## 2022-06-17 DIAGNOSIS — O36.5990 PREGNANCY AFFECTED BY FETAL GROWTH RESTRICTION: ICD-10-CM

## 2022-06-17 PROCEDURE — 76816 OB US FOLLOW-UP PER FETUS: CPT | Mod: 26 | Performed by: OBSTETRICS & GYNECOLOGY

## 2022-06-17 PROCEDURE — 59025 FETAL NON-STRESS TEST: CPT

## 2022-06-17 PROCEDURE — 76816 OB US FOLLOW-UP PER FETUS: CPT

## 2022-06-17 PROCEDURE — 76820 UMBILICAL ARTERY ECHO: CPT | Mod: 26 | Performed by: OBSTETRICS & GYNECOLOGY

## 2022-06-17 PROCEDURE — 59025 FETAL NON-STRESS TEST: CPT | Mod: 26 | Performed by: OBSTETRICS & GYNECOLOGY

## 2022-06-17 PROCEDURE — 99207 PR NO CHARGE LOS: CPT | Performed by: OBSTETRICS & GYNECOLOGY

## 2022-06-17 NOTE — NURSING NOTE
NST Performed due to fetal growth restriction.  Dr. Reeder reviewed efm tracing. See NST/BPP Doc Flowsheet tab.

## 2022-06-23 ENCOUNTER — ANCILLARY PROCEDURE (OUTPATIENT)
Dept: ULTRASOUND IMAGING | Facility: HOSPITAL | Age: 32
End: 2022-06-23
Attending: OBSTETRICS & GYNECOLOGY
Payer: COMMERCIAL

## 2022-06-23 ENCOUNTER — OFFICE VISIT (OUTPATIENT)
Dept: MATERNAL FETAL MEDICINE | Facility: HOSPITAL | Age: 32
End: 2022-06-23
Attending: OBSTETRICS & GYNECOLOGY
Payer: COMMERCIAL

## 2022-06-23 DIAGNOSIS — O36.5990 PREGNANCY AFFECTED BY FETAL GROWTH RESTRICTION: ICD-10-CM

## 2022-06-23 PROCEDURE — 99207 PR NO CHARGE LOS: CPT | Performed by: OBSTETRICS & GYNECOLOGY

## 2022-06-23 PROCEDURE — 76820 UMBILICAL ARTERY ECHO: CPT | Mod: 26 | Performed by: OBSTETRICS & GYNECOLOGY

## 2022-06-23 PROCEDURE — 76815 OB US LIMITED FETUS(S): CPT | Mod: 26 | Performed by: OBSTETRICS & GYNECOLOGY

## 2022-06-23 PROCEDURE — 59025 FETAL NON-STRESS TEST: CPT

## 2022-06-23 PROCEDURE — 76820 UMBILICAL ARTERY ECHO: CPT

## 2022-06-23 PROCEDURE — 59025 FETAL NON-STRESS TEST: CPT | Mod: 26 | Performed by: OBSTETRICS & GYNECOLOGY

## 2022-06-29 ENCOUNTER — OFFICE VISIT (OUTPATIENT)
Dept: MATERNAL FETAL MEDICINE | Facility: HOSPITAL | Age: 32
End: 2022-06-29
Attending: OBSTETRICS & GYNECOLOGY
Payer: COMMERCIAL

## 2022-06-29 ENCOUNTER — ANCILLARY PROCEDURE (OUTPATIENT)
Dept: ULTRASOUND IMAGING | Facility: HOSPITAL | Age: 32
End: 2022-06-29
Attending: OBSTETRICS & GYNECOLOGY
Payer: COMMERCIAL

## 2022-06-29 DIAGNOSIS — O36.5990 PREGNANCY AFFECTED BY FETAL GROWTH RESTRICTION: ICD-10-CM

## 2022-06-29 PROCEDURE — 99207 PR NO CHARGE LOS: CPT | Performed by: OBSTETRICS & GYNECOLOGY

## 2022-06-29 PROCEDURE — 76820 UMBILICAL ARTERY ECHO: CPT

## 2022-06-29 PROCEDURE — 59025 FETAL NON-STRESS TEST: CPT | Mod: 26 | Performed by: OBSTETRICS & GYNECOLOGY

## 2022-06-29 PROCEDURE — 76815 OB US LIMITED FETUS(S): CPT | Mod: 26 | Performed by: OBSTETRICS & GYNECOLOGY

## 2022-06-29 PROCEDURE — 76820 UMBILICAL ARTERY ECHO: CPT | Mod: 26 | Performed by: OBSTETRICS & GYNECOLOGY

## 2022-06-29 PROCEDURE — 59025 FETAL NON-STRESS TEST: CPT

## 2022-06-30 ENCOUNTER — PRENATAL OFFICE VISIT (OUTPATIENT)
Dept: FAMILY MEDICINE | Facility: CLINIC | Age: 32
End: 2022-06-30
Payer: COMMERCIAL

## 2022-06-30 VITALS
OXYGEN SATURATION: 98 % | HEIGHT: 62 IN | DIASTOLIC BLOOD PRESSURE: 58 MMHG | WEIGHT: 124.4 LBS | TEMPERATURE: 98.5 F | BODY MASS INDEX: 22.89 KG/M2 | HEART RATE: 94 BPM | RESPIRATION RATE: 14 BRPM | SYSTOLIC BLOOD PRESSURE: 84 MMHG

## 2022-06-30 DIAGNOSIS — O09.629 ENCOUNTER FOR SUPERVISION OF HIGH-RISK PREGNANCY OF YOUNG MULTIGRAVIDA: Primary | ICD-10-CM

## 2022-06-30 PROCEDURE — 99207 PR PRENATAL VISIT: CPT | Performed by: FAMILY MEDICINE

## 2022-06-30 NOTE — PROGRESS NOTES
She is doing well.  Denies any bleeding, cramping ,or lof.  Baby is moving well.    Denies any headaches or vision changes.    Denies any lower extremity edema.    Denies any ruq pain.  Denies itching.   Unstable lie, was transverse yesterday.    Breastfeeding discussed.   neds GBS at next visit.

## 2022-07-08 ENCOUNTER — ANCILLARY PROCEDURE (OUTPATIENT)
Dept: ULTRASOUND IMAGING | Facility: HOSPITAL | Age: 32
End: 2022-07-08
Attending: OBSTETRICS & GYNECOLOGY
Payer: COMMERCIAL

## 2022-07-08 ENCOUNTER — OFFICE VISIT (OUTPATIENT)
Dept: MATERNAL FETAL MEDICINE | Facility: HOSPITAL | Age: 32
End: 2022-07-08
Attending: OBSTETRICS & GYNECOLOGY
Payer: COMMERCIAL

## 2022-07-08 DIAGNOSIS — O36.5990 PREGNANCY AFFECTED BY FETAL GROWTH RESTRICTION: ICD-10-CM

## 2022-07-08 DIAGNOSIS — O36.5990 PREGNANCY AFFECTED BY FETAL GROWTH RESTRICTION: Primary | ICD-10-CM

## 2022-07-08 PROCEDURE — 59025 FETAL NON-STRESS TEST: CPT

## 2022-07-08 PROCEDURE — 76820 UMBILICAL ARTERY ECHO: CPT | Mod: 26 | Performed by: OBSTETRICS & GYNECOLOGY

## 2022-07-08 PROCEDURE — 76816 OB US FOLLOW-UP PER FETUS: CPT | Mod: 26 | Performed by: OBSTETRICS & GYNECOLOGY

## 2022-07-08 PROCEDURE — 99207 PR NO CHARGE LOS: CPT | Performed by: OBSTETRICS & GYNECOLOGY

## 2022-07-08 PROCEDURE — 76816 OB US FOLLOW-UP PER FETUS: CPT

## 2022-07-08 NOTE — NURSING NOTE
NST Performed due to FGR.   reviewed efm tracing. See NST/BPP Doc Flowsheet tab.    Tricia Amador RN

## 2022-07-12 ENCOUNTER — PRENATAL OFFICE VISIT (OUTPATIENT)
Dept: FAMILY MEDICINE | Facility: CLINIC | Age: 32
End: 2022-07-12
Payer: COMMERCIAL

## 2022-07-12 VITALS
BODY MASS INDEX: 22.68 KG/M2 | WEIGHT: 123.25 LBS | DIASTOLIC BLOOD PRESSURE: 60 MMHG | HEART RATE: 91 BPM | SYSTOLIC BLOOD PRESSURE: 94 MMHG | HEIGHT: 62 IN | RESPIRATION RATE: 16 BRPM | OXYGEN SATURATION: 99 %

## 2022-07-12 DIAGNOSIS — Q83.3 ACCESSORY NIPPLE IN FEMALE: ICD-10-CM

## 2022-07-12 DIAGNOSIS — O09.629 ENCOUNTER FOR SUPERVISION OF HIGH-RISK PREGNANCY OF YOUNG MULTIGRAVIDA: Primary | ICD-10-CM

## 2022-07-12 PROCEDURE — 87653 STREP B DNA AMP PROBE: CPT | Performed by: FAMILY MEDICINE

## 2022-07-12 PROCEDURE — 99207 PR PRENATAL VISIT: CPT | Performed by: FAMILY MEDICINE

## 2022-07-12 NOTE — PROGRESS NOTES
She is doing well.  Denies any bleeding, cramping ,or lof.  Baby is moving well.    Denies any headaches or vision changes.    Denies any lower extremity edema.    Denies any ruq pain.  Denies itching. .  She is having mild contractions.    Reviewed induction of labor, reviewed pitocin and cytotec   Will plan for induction at 38 3/7.    Accessory nipple is noted.

## 2022-07-13 LAB — GP B STREP DNA SPEC QL NAA+PROBE: NEGATIVE

## 2022-07-15 ENCOUNTER — ANCILLARY PROCEDURE (OUTPATIENT)
Dept: ULTRASOUND IMAGING | Facility: HOSPITAL | Age: 32
End: 2022-07-15
Attending: OBSTETRICS & GYNECOLOGY
Payer: COMMERCIAL

## 2022-07-15 ENCOUNTER — OFFICE VISIT (OUTPATIENT)
Dept: MATERNAL FETAL MEDICINE | Facility: HOSPITAL | Age: 32
End: 2022-07-15
Attending: OBSTETRICS & GYNECOLOGY
Payer: COMMERCIAL

## 2022-07-15 DIAGNOSIS — O36.5990 PREGNANCY AFFECTED BY FETAL GROWTH RESTRICTION: Primary | ICD-10-CM

## 2022-07-15 DIAGNOSIS — O36.5990 PREGNANCY AFFECTED BY FETAL GROWTH RESTRICTION: ICD-10-CM

## 2022-07-15 PROCEDURE — 59025 FETAL NON-STRESS TEST: CPT

## 2022-07-15 PROCEDURE — 99207 PR NO CHARGE LOS: CPT | Performed by: OBSTETRICS & GYNECOLOGY

## 2022-07-15 PROCEDURE — 76815 OB US LIMITED FETUS(S): CPT | Mod: 26 | Performed by: OBSTETRICS & GYNECOLOGY

## 2022-07-15 PROCEDURE — 76820 UMBILICAL ARTERY ECHO: CPT

## 2022-07-15 PROCEDURE — 59025 FETAL NON-STRESS TEST: CPT | Mod: 26 | Performed by: OBSTETRICS & GYNECOLOGY

## 2022-07-19 ENCOUNTER — PRENATAL OFFICE VISIT (OUTPATIENT)
Dept: FAMILY MEDICINE | Facility: CLINIC | Age: 32
End: 2022-07-19
Payer: COMMERCIAL

## 2022-07-19 VITALS
TEMPERATURE: 97.6 F | RESPIRATION RATE: 16 BRPM | HEIGHT: 62 IN | WEIGHT: 125 LBS | OXYGEN SATURATION: 99 % | HEART RATE: 84 BPM | SYSTOLIC BLOOD PRESSURE: 88 MMHG | BODY MASS INDEX: 23 KG/M2 | DIASTOLIC BLOOD PRESSURE: 40 MMHG

## 2022-07-19 DIAGNOSIS — O36.5990 PREGNANCY AFFECTED BY FETAL GROWTH RESTRICTION: ICD-10-CM

## 2022-07-19 DIAGNOSIS — O09.90 SUPERVISION OF HIGH RISK PREGNANCY, ANTEPARTUM: Primary | ICD-10-CM

## 2022-07-19 PROBLEM — Z3A.11 11 WEEKS GESTATION OF PREGNANCY: Status: RESOLVED | Noted: 2022-01-19 | Resolved: 2022-07-19

## 2022-07-19 PROBLEM — O09.629: Status: RESOLVED | Noted: 2022-02-16 | Resolved: 2022-07-19

## 2022-07-19 PROBLEM — A04.8 H. PYLORI INFECTION: Status: RESOLVED | Noted: 2017-03-21 | Resolved: 2022-07-19

## 2022-07-19 PROCEDURE — 99207 PR PRENATAL VISIT: CPT | Performed by: FAMILY MEDICINE

## 2022-07-19 NOTE — PROGRESS NOTES
"Assessment/Plan:   32 year old  at 37w5d    Supervision of high risk pregnancy, antepartum  Pregnancy affected by fetal growth restriction  Doing well.  Discussed when to call/go in for labor.  We discussed recommendation to have this baby by 39 weeks 0 days given fetal growth restriction on physical exam and ultrasound.  Patient her and her  feel that since everything else is normal (normal fluid, normal cord Dopplers, etc.), they strongly prefer to wait for natural labor.  Additionally, all of her babies have been born before the 39th week so they are hopeful for spontaneous labor in that regard.  Finally, she is a little concerned about the due date being accurate since there is a 5-day discrepancy between the due date from the LMP versus the 12-week ultrasound; she reports today that the LMP was a little bit approximate and she worries that the true due date could be 2022 instead of 2022.  I recommended that we could do an injection toward the end of the 39th week based on LMP, which would put her in the 38th week either way, but she declines.  She will continue to have fetal monitoring.  I will see her next week at which point she will be 39-week 1 day and will reassess for potential intervention plan at that time.  In the meantime, if any signs or symptoms of her baby not being vigorous, she should be seen promptly in the hospital.  Particularly decreased fetal movement or a feeling that something is not right.       Return in 1 week (on 2022) for prenatal care.  Subjective:   Dianne Ochoa is a 32 year old  at 37w5d who is seen for routine prenatal care.   Doing well.  Endorses normal fetal movement.    Objective   Vitals: BP (!) 88/40   Pulse 84   Temp 97.6  F (36.4  C) (Oral)   Resp 16   Ht 1.575 m (5' 2.01\")   Wt 56.7 kg (125 lb)   LMP 10/28/2021 (Exact Date)   SpO2 99%   BMI 22.86 kg/m     Total weight gain:  4.536 kg (10 lb)   Exam: Per flowsheet     "

## 2022-07-22 ENCOUNTER — OFFICE VISIT (OUTPATIENT)
Dept: MATERNAL FETAL MEDICINE | Facility: HOSPITAL | Age: 32
End: 2022-07-22
Attending: OBSTETRICS & GYNECOLOGY
Payer: COMMERCIAL

## 2022-07-22 ENCOUNTER — ANCILLARY PROCEDURE (OUTPATIENT)
Dept: ULTRASOUND IMAGING | Facility: HOSPITAL | Age: 32
End: 2022-07-22
Attending: OBSTETRICS & GYNECOLOGY
Payer: COMMERCIAL

## 2022-07-22 DIAGNOSIS — O36.5990 PREGNANCY AFFECTED BY FETAL GROWTH RESTRICTION: ICD-10-CM

## 2022-07-22 PROCEDURE — 59025 FETAL NON-STRESS TEST: CPT | Mod: 26 | Performed by: OBSTETRICS & GYNECOLOGY

## 2022-07-22 PROCEDURE — 59025 FETAL NON-STRESS TEST: CPT

## 2022-07-22 PROCEDURE — 76820 UMBILICAL ARTERY ECHO: CPT | Mod: 26 | Performed by: OBSTETRICS & GYNECOLOGY

## 2022-07-22 PROCEDURE — 99207 PR NO CHARGE LOS: CPT | Performed by: OBSTETRICS & GYNECOLOGY

## 2022-07-22 PROCEDURE — 76820 UMBILICAL ARTERY ECHO: CPT

## 2022-07-22 PROCEDURE — 76815 OB US LIMITED FETUS(S): CPT | Mod: 26 | Performed by: OBSTETRICS & GYNECOLOGY

## 2022-07-22 NOTE — NURSING NOTE
NST Performed due to fetal growth restriction.  Dr. Lopez reviewed efm tracing. See NST/BPP Doc Flowsheet tab.

## 2022-07-22 NOTE — PROGRESS NOTES
Please see full imaging report from [employer] program under imaging tab.    Bogdan Lopez MD  Maternal Fetal Medicine

## 2022-07-24 ENCOUNTER — HEALTH MAINTENANCE LETTER (OUTPATIENT)
Age: 32
End: 2022-07-24

## 2022-07-29 ENCOUNTER — PRENATAL OFFICE VISIT (OUTPATIENT)
Dept: FAMILY MEDICINE | Facility: CLINIC | Age: 32
End: 2022-07-29
Payer: COMMERCIAL

## 2022-07-29 ENCOUNTER — OFFICE VISIT (OUTPATIENT)
Dept: MATERNAL FETAL MEDICINE | Facility: HOSPITAL | Age: 32
End: 2022-07-29
Attending: OBSTETRICS & GYNECOLOGY
Payer: COMMERCIAL

## 2022-07-29 ENCOUNTER — ANCILLARY PROCEDURE (OUTPATIENT)
Dept: ULTRASOUND IMAGING | Facility: HOSPITAL | Age: 32
End: 2022-07-29
Attending: OBSTETRICS & GYNECOLOGY
Payer: COMMERCIAL

## 2022-07-29 VITALS
HEIGHT: 62 IN | TEMPERATURE: 98.1 F | OXYGEN SATURATION: 99 % | RESPIRATION RATE: 20 BRPM | DIASTOLIC BLOOD PRESSURE: 60 MMHG | WEIGHT: 125 LBS | HEART RATE: 70 BPM | BODY MASS INDEX: 23 KG/M2 | SYSTOLIC BLOOD PRESSURE: 90 MMHG

## 2022-07-29 DIAGNOSIS — O36.5990 PREGNANCY AFFECTED BY FETAL GROWTH RESTRICTION: ICD-10-CM

## 2022-07-29 DIAGNOSIS — O09.90 SUPERVISION OF HIGH RISK PREGNANCY, ANTEPARTUM: Primary | ICD-10-CM

## 2022-07-29 PROCEDURE — 99207 PR PRENATAL VISIT: CPT | Performed by: FAMILY MEDICINE

## 2022-07-29 PROCEDURE — U0003 INFECTIOUS AGENT DETECTION BY NUCLEIC ACID (DNA OR RNA); SEVERE ACUTE RESPIRATORY SYNDROME CORONAVIRUS 2 (SARS-COV-2) (CORONAVIRUS DISEASE [COVID-19]), AMPLIFIED PROBE TECHNIQUE, MAKING USE OF HIGH THROUGHPUT TECHNOLOGIES AS DESCRIBED BY CMS-2020-01-R: HCPCS | Performed by: FAMILY MEDICINE

## 2022-07-29 PROCEDURE — 99207 PR NO CHARGE LOS: CPT | Performed by: OBSTETRICS & GYNECOLOGY

## 2022-07-29 PROCEDURE — 76815 OB US LIMITED FETUS(S): CPT | Mod: 26 | Performed by: OBSTETRICS & GYNECOLOGY

## 2022-07-29 PROCEDURE — 76820 UMBILICAL ARTERY ECHO: CPT | Mod: 26 | Performed by: OBSTETRICS & GYNECOLOGY

## 2022-07-29 PROCEDURE — U0005 INFEC AGEN DETEC AMPLI PROBE: HCPCS | Performed by: FAMILY MEDICINE

## 2022-07-29 PROCEDURE — 59025 FETAL NON-STRESS TEST: CPT

## 2022-07-29 PROCEDURE — 76820 UMBILICAL ARTERY ECHO: CPT

## 2022-07-29 PROCEDURE — 59025 FETAL NON-STRESS TEST: CPT | Mod: 26 | Performed by: OBSTETRICS & GYNECOLOGY

## 2022-07-29 NOTE — PATIENT INSTRUCTIONS
You are set up for labor induction at [facility] on Sunday, 7/31/2022.  Please call [facility] at about 2:30 PM, [phone]; we will plan on arriving at about 4-5:00 PM.

## 2022-07-29 NOTE — PROGRESS NOTES
"Assessment/Plan:   32 year old  at 39w1d    Supervision of high risk pregnancy, antepartum  Pregnancy affected by fetal growth restriction  I continue to recommend labor induction given intrauterine growth restriction.  Patient and her  agree, although specifically want induction on a Monday.  Fortunately, I was able to arrange for her to go in this  night (2 days from now) for cervical ripening and then Pitocin induction on Monday.   Of note, I could not confirm for certain that baby is cephalic at this time, but she does have an ultrasound with MFM in a couple of hours.  If it turns out baby is not cephalic, will manage accordingly.  - Asymptomatic COVID-19 Virus (Coronavirus) by PCR Nose      Subjective:   Dianne Ochoa is a 32 year old  at 39w1d who is seen for routine prenatal care.   Doing well.  Endorses normal fetal movement, although baby does not move as much now as when she was 36 weeks.  No changes  Objective   Vitals: BP 90/60 (BP Location: Right arm, Patient Position: Sitting, Cuff Size: Adult Small)   Pulse 70   Temp 98.1  F (36.7  C) (Oral)   Resp 20   Ht 1.575 m (5' 2.01\")   Wt 56.7 kg (125 lb)   LMP 10/28/2021 (Exact Date)   SpO2 99%   BMI 22.86 kg/m     Total weight gain:  4.536 kg (10 lb)   Exam: Per flowsheet  Cervix 0/?/Low, soft, posterior     "

## 2022-07-30 LAB — SARS-COV-2 RNA RESP QL NAA+PROBE: NEGATIVE

## 2022-07-31 ENCOUNTER — HOSPITAL ENCOUNTER (INPATIENT)
Facility: HOSPITAL | Age: 32
LOS: 1 days | Discharge: HOME | End: 2022-08-01
Attending: FAMILY MEDICINE | Admitting: FAMILY MEDICINE
Payer: COMMERCIAL

## 2022-07-31 DIAGNOSIS — O36.5930 POOR FETAL GROWTH AFFECTING MANAGEMENT OF MOTHER IN THIRD TRIMESTER, SINGLE OR UNSPECIFIED FETUS: Primary | ICD-10-CM

## 2022-07-31 PROBLEM — O99.019 ANEMIA IN PREGNANCY: Status: ACTIVE | Noted: 2022-07-31

## 2022-07-31 PROBLEM — O36.5990 INTRAUTERINE GROWTH RESTRICTION (IUGR) AFFECTING CARE OF MOTHER: Status: ACTIVE | Noted: 2022-07-31

## 2022-07-31 LAB
ABO/RH(D): NORMAL
ANTIBODY SCREEN: NEGATIVE
ERYTHROCYTE [DISTWIDTH] IN BLOOD BY AUTOMATED COUNT: 13 % (ref 10–15)
HCT VFR BLD AUTO: 34 % (ref 35–47)
HGB BLD-MCNC: 11.1 G/DL (ref 11.7–15.7)
HOLD SPECIMEN: NORMAL
MCH RBC QN AUTO: 30.4 PG (ref 26.5–33)
MCHC RBC AUTO-ENTMCNC: 32.6 G/DL (ref 31.5–36.5)
MCV RBC AUTO: 93 FL (ref 78–100)
PLATELET # BLD AUTO: 134 10E3/UL (ref 150–450)
RBC # BLD AUTO: 3.65 10E6/UL (ref 3.8–5.2)
SPECIMEN EXPIRATION DATE: NORMAL
WBC # BLD AUTO: 9.1 10E3/UL (ref 4–11)

## 2022-07-31 PROCEDURE — 86780 TREPONEMA PALLIDUM: CPT | Performed by: FAMILY MEDICINE

## 2022-07-31 PROCEDURE — 250N000013 HC RX MED GY IP 250 OP 250 PS 637: Performed by: FAMILY MEDICINE

## 2022-07-31 PROCEDURE — 120N000001 HC R&B MED SURG/OB

## 2022-07-31 PROCEDURE — 85027 COMPLETE CBC AUTOMATED: CPT | Performed by: FAMILY MEDICINE

## 2022-07-31 PROCEDURE — 86850 RBC ANTIBODY SCREEN: CPT | Performed by: FAMILY MEDICINE

## 2022-07-31 PROCEDURE — 99222 1ST HOSP IP/OBS MODERATE 55: CPT | Performed by: FAMILY MEDICINE

## 2022-07-31 PROCEDURE — 36415 COLL VENOUS BLD VENIPUNCTURE: CPT | Performed by: FAMILY MEDICINE

## 2022-07-31 RX ORDER — PROCHLORPERAZINE MALEATE 10 MG
10 TABLET ORAL EVERY 6 HOURS PRN
Status: DISCONTINUED | OUTPATIENT
Start: 2022-07-31 | End: 2022-08-01 | Stop reason: HOSPADM

## 2022-07-31 RX ORDER — METOCLOPRAMIDE HYDROCHLORIDE 5 MG/ML
10 INJECTION INTRAMUSCULAR; INTRAVENOUS EVERY 6 HOURS PRN
Status: DISCONTINUED | OUTPATIENT
Start: 2022-07-31 | End: 2022-08-01 | Stop reason: HOSPADM

## 2022-07-31 RX ORDER — ONDANSETRON 4 MG/1
4 TABLET, ORALLY DISINTEGRATING ORAL EVERY 6 HOURS PRN
Status: DISCONTINUED | OUTPATIENT
Start: 2022-07-31 | End: 2022-08-01 | Stop reason: HOSPADM

## 2022-07-31 RX ORDER — MORPHINE SULFATE 10 MG/ML
10 INJECTION, SOLUTION INTRAMUSCULAR; INTRAVENOUS
Status: DISCONTINUED | OUTPATIENT
Start: 2022-07-31 | End: 2022-08-01 | Stop reason: HOSPADM

## 2022-07-31 RX ORDER — METHYLERGONOVINE MALEATE 0.2 MG/ML
200 INJECTION INTRAVENOUS
Status: DISCONTINUED | OUTPATIENT
Start: 2022-07-31 | End: 2022-08-01 | Stop reason: HOSPADM

## 2022-07-31 RX ORDER — PROCHLORPERAZINE 25 MG
25 SUPPOSITORY, RECTAL RECTAL EVERY 12 HOURS PRN
Status: DISCONTINUED | OUTPATIENT
Start: 2022-07-31 | End: 2022-08-01 | Stop reason: HOSPADM

## 2022-07-31 RX ORDER — CITRIC ACID/SODIUM CITRATE 334-500MG
30 SOLUTION, ORAL ORAL
Status: DISCONTINUED | OUTPATIENT
Start: 2022-07-31 | End: 2022-08-01 | Stop reason: HOSPADM

## 2022-07-31 RX ORDER — NALOXONE HYDROCHLORIDE 0.4 MG/ML
0.2 INJECTION, SOLUTION INTRAMUSCULAR; INTRAVENOUS; SUBCUTANEOUS
Status: DISCONTINUED | OUTPATIENT
Start: 2022-07-31 | End: 2022-08-01 | Stop reason: HOSPADM

## 2022-07-31 RX ORDER — OXYTOCIN 10 [USP'U]/ML
10 INJECTION, SOLUTION INTRAMUSCULAR; INTRAVENOUS
Status: DISCONTINUED | OUTPATIENT
Start: 2022-07-31 | End: 2022-08-01 | Stop reason: HOSPADM

## 2022-07-31 RX ORDER — IBUPROFEN 800 MG/1
800 TABLET, FILM COATED ORAL
Status: DISCONTINUED | OUTPATIENT
Start: 2022-07-31 | End: 2022-08-01 | Stop reason: HOSPADM

## 2022-07-31 RX ORDER — MISOPROSTOL 200 UG/1
800 TABLET ORAL
Status: DISCONTINUED | OUTPATIENT
Start: 2022-07-31 | End: 2022-08-01 | Stop reason: HOSPADM

## 2022-07-31 RX ORDER — OXYTOCIN/0.9 % SODIUM CHLORIDE 30/500 ML
340 PLASTIC BAG, INJECTION (ML) INTRAVENOUS CONTINUOUS PRN
Status: DISCONTINUED | OUTPATIENT
Start: 2022-07-31 | End: 2022-08-01 | Stop reason: HOSPADM

## 2022-07-31 RX ORDER — NALOXONE HYDROCHLORIDE 0.4 MG/ML
0.4 INJECTION, SOLUTION INTRAMUSCULAR; INTRAVENOUS; SUBCUTANEOUS
Status: DISCONTINUED | OUTPATIENT
Start: 2022-07-31 | End: 2022-08-01 | Stop reason: HOSPADM

## 2022-07-31 RX ORDER — TERBUTALINE SULFATE 1 MG/ML
0.25 INJECTION, SOLUTION SUBCUTANEOUS
Status: DISCONTINUED | OUTPATIENT
Start: 2022-07-31 | End: 2022-08-01 | Stop reason: HOSPADM

## 2022-07-31 RX ORDER — MISOPROSTOL 200 UG/1
400 TABLET ORAL
Status: DISCONTINUED | OUTPATIENT
Start: 2022-07-31 | End: 2022-08-01 | Stop reason: HOSPADM

## 2022-07-31 RX ORDER — CARBOPROST TROMETHAMINE 250 UG/ML
250 INJECTION, SOLUTION INTRAMUSCULAR
Status: DISCONTINUED | OUTPATIENT
Start: 2022-07-31 | End: 2022-08-01 | Stop reason: HOSPADM

## 2022-07-31 RX ORDER — ONDANSETRON 2 MG/ML
4 INJECTION INTRAMUSCULAR; INTRAVENOUS EVERY 6 HOURS PRN
Status: DISCONTINUED | OUTPATIENT
Start: 2022-07-31 | End: 2022-08-01 | Stop reason: HOSPADM

## 2022-07-31 RX ORDER — METOCLOPRAMIDE 10 MG/1
10 TABLET ORAL EVERY 6 HOURS PRN
Status: DISCONTINUED | OUTPATIENT
Start: 2022-07-31 | End: 2022-08-01 | Stop reason: HOSPADM

## 2022-07-31 RX ORDER — MISOPROSTOL 100 UG/1
25 TABLET ORAL
Status: DISCONTINUED | OUTPATIENT
Start: 2022-07-31 | End: 2022-08-01 | Stop reason: HOSPADM

## 2022-07-31 RX ORDER — HYDROXYZINE HYDROCHLORIDE 50 MG/1
50 TABLET, FILM COATED ORAL
Status: DISCONTINUED | OUTPATIENT
Start: 2022-07-31 | End: 2022-08-01 | Stop reason: HOSPADM

## 2022-07-31 RX ORDER — KETOROLAC TROMETHAMINE 30 MG/ML
30 INJECTION, SOLUTION INTRAMUSCULAR; INTRAVENOUS
Status: DISCONTINUED | OUTPATIENT
Start: 2022-07-31 | End: 2022-08-01 | Stop reason: HOSPADM

## 2022-07-31 RX ORDER — OXYTOCIN/0.9 % SODIUM CHLORIDE 30/500 ML
100-340 PLASTIC BAG, INJECTION (ML) INTRAVENOUS CONTINUOUS PRN
Status: DISCONTINUED | OUTPATIENT
Start: 2022-07-31 | End: 2022-08-01 | Stop reason: HOSPADM

## 2022-07-31 RX ADMIN — MISOPROSTOL 25 MCG: 100 TABLET ORAL at 23:03

## 2022-07-31 RX ADMIN — MISOPROSTOL 25 MCG: 100 TABLET ORAL at 18:43

## 2022-07-31 ASSESSMENT — ACTIVITIES OF DAILY LIVING (ADL)
WEAR_GLASSES_OR_BLIND: NO
CONCENTRATING,_REMEMBERING_OR_MAKING_DECISIONS_DIFFICULTY: NO
WALKING_OR_CLIMBING_STAIRS_DIFFICULTY: NO
DRESSING/BATHING_DIFFICULTY: NO
FALL_HISTORY_WITHIN_LAST_SIX_MONTHS: NO
ADLS_ACUITY_SCORE: 18
DOING_ERRANDS_INDEPENDENTLY_DIFFICULTY: NO
TOILETING_ISSUES: NO
DIFFICULTY_EATING/SWALLOWING: NO
CHANGE_IN_FUNCTIONAL_STATUS_SINCE_ONSET_OF_CURRENT_ILLNESS/INJURY: NO
ADLS_ACUITY_SCORE: 18

## 2022-08-01 ENCOUNTER — HOSPITAL ENCOUNTER (INPATIENT)
Facility: HOSPITAL | Age: 32
End: 2022-08-01
Admitting: FAMILY MEDICINE
Payer: COMMERCIAL

## 2022-08-01 VITALS
HEIGHT: 62 IN | RESPIRATION RATE: 16 BRPM | OXYGEN SATURATION: 98 % | SYSTOLIC BLOOD PRESSURE: 92 MMHG | HEART RATE: 73 BPM | BODY MASS INDEX: 23 KG/M2 | TEMPERATURE: 98.1 F | DIASTOLIC BLOOD PRESSURE: 54 MMHG | WEIGHT: 125 LBS

## 2022-08-01 DIAGNOSIS — O36.5990 PREGNANCY AFFECTED BY FETAL GROWTH RESTRICTION: Primary | ICD-10-CM

## 2022-08-01 LAB — T PALLIDUM AB SER QL: NONREACTIVE

## 2022-08-01 PROCEDURE — 250N000013 HC RX MED GY IP 250 OP 250 PS 637: Performed by: FAMILY MEDICINE

## 2022-08-01 PROCEDURE — 99238 HOSP IP/OBS DSCHRG MGMT 30/<: CPT | Performed by: FAMILY MEDICINE

## 2022-08-01 RX ORDER — OXYTOCIN/0.9 % SODIUM CHLORIDE 30/500 ML
1-24 PLASTIC BAG, INJECTION (ML) INTRAVENOUS CONTINUOUS
Status: DISCONTINUED | OUTPATIENT
Start: 2022-08-01 | End: 2022-08-01 | Stop reason: HOSPADM

## 2022-08-01 RX ORDER — SODIUM CHLORIDE, SODIUM LACTATE, POTASSIUM CHLORIDE, CALCIUM CHLORIDE 600; 310; 30; 20 MG/100ML; MG/100ML; MG/100ML; MG/100ML
INJECTION, SOLUTION INTRAVENOUS CONTINUOUS PRN
Status: DISCONTINUED | OUTPATIENT
Start: 2022-08-01 | End: 2022-08-01 | Stop reason: HOSPADM

## 2022-08-01 RX ORDER — LIDOCAINE 40 MG/G
CREAM TOPICAL
Status: DISCONTINUED | OUTPATIENT
Start: 2022-08-01 | End: 2022-08-01 | Stop reason: HOSPADM

## 2022-08-01 RX ADMIN — MISOPROSTOL 25 MCG: 100 TABLET ORAL at 04:48

## 2022-08-01 RX ADMIN — MISOPROSTOL 25 MCG: 100 TABLET ORAL at 06:50

## 2022-08-01 RX ADMIN — MISOPROSTOL 25 MCG: 100 TABLET ORAL at 01:04

## 2022-08-01 ASSESSMENT — ACTIVITIES OF DAILY LIVING (ADL)
ADLS_ACUITY_SCORE: 18

## 2022-08-01 NOTE — DISCHARGE SUMMARY
Maternal Discharge Summary  [facility]  Date of Service: 2022    Name      Dianne Ochoa         1990  MRN       [id number]  PCP        Khadra Truong at [facility], [phone].    ________________________________________________________________________    Assessment/Plan:  Dianne Ochoa is a 32 year old   at 39w4d who was admitted last night for induction of labor for IUGR.  Ultimately declined induction after overnight cervical ripening (see below).  Will resume monitoring at [facility] this week, induction rescheduled for 22.          ________________________________________________________________________    Admission Date:  2022  Discharge Date:  2022  Gestational Age at Discharge: 39w4d      Principal Problem:    Intrauterine growth restriction (IUGR) affecting care of mother  Active Problems:    Anemia in pregnancy      Hospital course:  Dianne Ochoa is a 32 year old   at 39w4d who was admitted last night for cervical ripening and induction of labor due to fetal growth restriction.  She had been monitored by [facility] for this since 20 weeks.  She had COVID infection in first trimester.  Delivery was recommended for 39 weeks, but she had declined induction until I saw her in clinic 3 days ago.  At that time, she had expressed that she wanted to have her baby either this Monday or next.  As her cervix was not favorable, she was admitted  night (last night) for cervical ripening.  After her first dose she had a few contractions and was worried her labor would go too fast and she would end up having her baby on , so we had her skip 1 dose and then resume at 11:00 PM.  This morning, she had Marshall score of 5 and we were going to start Pitocin but at that point she announced that she did not want any medicine to help her labor and she wanted to go home.    I had a long discussion with her on the phone.  Her concern was  "that all of her labors have taken 2 or 3 days and she did not want to be in the hospital that long waiting for this baby to come and still wanted to try to have her baby on a Monday.  It was not clear to me the reason for desiring on Monday delivery.  We discussed that although her biophysical profiles and fetal monitoring while in the hospital were all reassuring, there is a small but real risk of terrible outcomes for the baby given the intrauterine growth restriction.  She understood this, but still preferred to go home and await natural labor, with induction no sooner than 8/8/2022 at which time she would be 40 weeks 4 days gestation.    As it is certainly up to her to make an informed decision in this regard, I let her know that I still strongly advise induction.  I recommended a trial of Pitocin today and we could set an endpoint for which if the baby was not delivered or nearly delivered, then we could stop and at that point she could consider going home, but she preferred to not start the medication at all as she did not believe it would be efficient and that it would still take several days.  Therefore, ultimately she will go home today.  I will get her back in with maternal-fetal medicine within the next few days for her weekly BPP/NST and have her scheduled for induction of labor again on 8/8/2022.  We discussed that if anything it does not seem right with her her or her baby prior to being seen again, such as decreased fetal movement, feeling that something is not right, etc., I had like her to come right back to the maternity obregon and she agrees.    Discharge Exam:  Patient Vitals for the past 24 hrs:   BP Temp Temp src Pulse Resp SpO2 Height Weight   08/01/22 0447 92/54 98.1  F (36.7  C) Oral -- 16 -- -- --   08/01/22 0055 103/59 98.3  F (36.8  C) Oral -- 16 -- -- --   07/31/22 2055 104/59 98.3  F (36.8  C) Oral -- 16 -- -- --   07/31/22 1722 99/59 97.6  F (36.4  C) Oral 73 16 98 % 1.575 m (5' 2\") 56.7 " kg (125 lb)     (Patient evaluated by telephone, discussion with RN, review of medical record, and remote viewing of fetal heart rate monitoring)  Gen:  Voice and speech are normal.  Resp: Able to speak in full sentences.  Psych:  Normal speech, appropriate, tracks conversation well.     EFM: At time of discharge, Cat 1, reactive, contractions every 6-9min.    Admission on 07/31/2022   Component Date Value Ref Range Status     WBC Count 07/31/2022 9.1  4.0 - 11.0 10e3/uL Final     RBC Count 07/31/2022 3.65 (A) 3.80 - 5.20 10e6/uL Final     Hemoglobin 07/31/2022 11.1 (A) 11.7 - 15.7 g/dL Final     Hematocrit 07/31/2022 34.0 (A) 35.0 - 47.0 % Final     MCV 07/31/2022 93  78 - 100 fL Final     MCH 07/31/2022 30.4  26.5 - 33.0 pg Final     MCHC 07/31/2022 32.6  31.5 - 36.5 g/dL Final     RDW 07/31/2022 13.0  10.0 - 15.0 % Final     Platelet Count 07/31/2022 134 (A) 150 - 450 10e3/uL Final     ABO/RH(D) 07/31/2022 O POS   Final     Antibody Screen 07/31/2022 Negative  Negative Final     SPECIMEN EXPIRATION DATE 07/31/2022 [id number]   Final     Hold Specimen 07/31/2022 JIC   Final   Prenatal Office Visit on 07/29/2022   Component Date Value Ref Range Status     SARS CoV2 PCR 07/29/2022 Negative  Negative Final    NEGATIVE: SARS-CoV-2 (COVID-19) RNA not detected, presumed negative.      Discharge Medications:   See medication reconciliation.     Completed by:   Madelin Smith MD  [facility]  8/1/2022 10:22 AM   used: Not needed.

## 2022-08-01 NOTE — PROGRESS NOTES
Dr Smith called, patient is not wanting to do induction at this time and would like to discharge home until next week.  Dr Smith calling patient room to speak with her and make a plan.

## 2022-08-01 NOTE — DISCHARGE INSTRUCTIONS
Discharge Instruction for Undelivered Patients      You were seen for:  Induction of Labor  We Consulted: Dr Smith  You had (Test or Medicine):Cytotec for cervical ripening, cervix did not change at this time.     Diet:   Drink 8 to 12 glasses of liquids (milk, juice, water) every day.  You may eat meals and snacks.     Activity:  Count fetal kicks everyday (see handout)  Call your doctor or nurse midwife if your baby is moving less than usual.     Call your provider if you notice:  Swelling in your face or increased swelling in your hands or legs.  Headaches that are not relieved by Tylenol (acetaminophen).  Changes in your vision (blurring: seeing spots or stars.)  Nausea (sick to your stomach) and vomiting (throwing up).   Weight gain of 5 pounds or more per week.  Heartburn that doesn't go away.  Signs of bladder infection: pain when you urinate (use the toilet), need to go more often and more urgently.  The bag of hardy (rupture of membranes) breaks, or you notice leaking in your underwear.  Bright red blood in your underwear.  Abdominal (lower belly) or stomach pain.  Second (plus) baby: Contractions (tightening) less than 10 minutes apart and getting stronger.    Follow-up:  As scheduled in the clinic, please see your MyChart for when to go to [facility] for your next ultrasound.

## 2022-08-01 NOTE — H&P
Maternal Admission H&P  [facility]  Date of Admission: 2022  Date of Service: 2022    Name      Dianne LINO       1990  MRN       [id number]  PCP        Khadra Wheeler at [facility], [phone].    ________________________________________________________________________    Assessment and Plan:  32 year old  at 39w3d.    Baby IUGR.  Had recommended induction by 39 weeks, but declined until now. Here for cervical ripening, but getting a few contractions and having bloody show after one dose cytotec.  Really prefers to have baby tomorrow (Monday); will hold next cytotec until 11:00pm tonight, then resume.  Plan starting pitocin in the morning if cervix favorable and not yet in labor.    Anticipate .    Group B strep: neg    Hx  delivery.    Hx COVID last November (possible contributor to IUGR); COVID neg now.    Mild anemia in pregnancy; last hgb 10.9 22.  Checking CBC, type and screen now    Mild baseline thrombocytopenia; checking CBC now to ensure hasn't progressed.  ________________________________________________________________________    Chief Complaint: induction of labor, indication intrauterine growth retardation.    HPI: Dianne Ochoa is a 32 year old woman at 39w3d, based on an Estimated Date of Delivery: Aug 4, 2022. She presents with plan for induction.  Doing well.  Baby moving less but still moving.  Notes had 2 contraction; 7:45pm and 8:00pm.  Some blood on toilet tissue.    Patient Active Problem List    Diagnosis Date Noted     Intrauterine growth restriction (IUGR) affecting care of mother 2022     Priority: Medium     Pregnancy affected by fetal growth restriction 2022     Priority: Medium     History of 2019 novel coronavirus disease (COVID-19) 2022     Priority: Medium     Early pregnancy       History of  delivery, currently pregnant in second trimester 2022      Priority: Medium     Thrombocytopenia (H) 2022     Priority: Medium     likely gestational       Keloid scar 2018     Priority: Medium      OB History    Para Term  AB Living   7 4 3 1 2 4   SAB IAB Ectopic Multiple Live Births   2 0 0 0 4      # Outcome Date GA Lbr Alfred/2nd Weight Sex Delivery Anes PTL Lv   7 Current            6  01/17/15 36w2d 06:12 / 00:15 2.48 kg (5 lb 7.5 oz) F Vag-Spont None N CHAGO      Complications: Placenta Previa      Name: MATILDE,FEMALE-OSMAN      Apgar1: 7  Apgar5: 8   5 SAB            4 SAB            3 Term 09/08/10 40w0d  2.41 kg (5 lb 5 oz) F Vag-Spont   CHAGO      Name: Radha Au   2 Term 10/10/09 40w0d  2.835 kg (6 lb 4 oz) F Vag-Spont   CHAGO      Name: Cosme Au   1 Term 10/16/08 40w0d  2.892 kg (6 lb 6 oz) F Vag-Spont   CHAGO      Name: Leticia Au     Review of Systems Negative except what is noted in HPI.     Past Medical History:   Diagnosis Date     Anemia      Antepartum hemorrhage     Created by Conversion      Group B Streptococcus carrier, delivered, current hospitalization 2015     H. pylori infection 3/21/2017     Placenta Previa 2015     Urogenital trichomoniasis       Past Surgical History:   Procedure Laterality Date     HERNIA REPAIR     No known drug allergies  Family History   Problem Relation Age of Onset     No Known Problems Mother      No Known Problems Father      No Known Problems Sister      No Known Problems Sister      No Known Problems Sister      No Known Problems Sister      No Known Problems Sister      No Known Problems Brother      No Known Problems Brother      No Known Problems Brother      No Known Problems Brother      No Known Problems Brother      No Known Problems Brother      Social History     Socioeconomic History     Marital status: Single     Spouse name: Not on file     Number of children: Not on file     Years of education: Not on file     Highest education level: Not on file   Occupational  History     Not on file   Tobacco Use     Smoking status: Never Smoker     Smokeless tobacco: Never Used   Vaping Use     Vaping Use: Never used   Substance and Sexual Activity     Alcohol use: No     Drug use: No     Sexual activity: Yes     Partners: Male     Birth control/protection: None   Other Topics Concern     Not on file   Social History Narrative     Not on file     Social Determinants of Health     Financial Resource Strain: Not on file   Food Insecurity: Not on file   Transportation Needs: Not on file   Physical Activity: Not on file   Stress: Not on file   Social Connections: Not on file   Intimate Partner Violence: Not on file   Housing Stability: Not on file     Prior to Admission Medication List  Facility-Administered Medications Prior to Admission   Medication Dose Route Frequency Provider Last Rate Last Admin     HYDROXYprogesterone caproate (ELLA) intramuscular injection 250 mg  250 mg Intramuscular Q7 Days Margot Palacios,          Medications Prior to Admission   Medication Sig Dispense Refill Last Dose     ferrous sulfate (FEROSUL) 325 (65 Fe) MG tablet Take 1 tablet (325 mg) by mouth daily (with breakfast) 90 tablet 3 7/30/2022 at Unknown time     Prenatal MV-Min-Fe Fum-FA-DHA (PRENATAL MULTIVITAMIN PLUS DHA) 27-0.8-250 MG CAPS Take 1 tablet by mouth daily OK to substitute formulary equivalent 100 capsule 3 7/31/2022 at Unknown time      Allergies  Allergies   Allergen Reactions     No Known Drug Allergies Unknown     Immunization History   Administered Date(s) Administered     HPV9 03/16/2017, 11/13/2017     HepB, Unspecified 05/24/2004, 01/19/2005, 07/05/2005     Influenza (H1N1) 12/08/2009     Influenza Vaccine IM > 6 months Valent IIV4 (Alfuria,Fluzone) 10/03/2014     MMR 05/24/2004, 01/19/2005     Poliovirus, inactivated (IPV) 05/24/2004, 01/19/2005, 07/05/2005     Rubella Immunity: Titer/md Dx 01/25/2022     Td,adult,historic,unspecified 05/24/2004, 01/19/2005     Tdap (Adacel,Boostrix)  "12/08/2014, 06/02/2022     Varicella 05/24/2004      Physical Exam  Patient Vitals for the past 24 hrs:   BP Temp Temp src Pulse Resp SpO2 Height Weight   07/31/22 1722 99/59 97.6  F (36.4  C) Oral 73 16 98 % 1.575 m (5' 2\") 56.7 kg (125 lb)     Wt Readings from Last 1 Encounters:   07/31/22 56.7 kg (125 lb)   Prepregnancy: 52.2 kg (115 lb), BMI 21.03. Total gain: 4.536 kg (10 lb) (expected gain: 11.5 kg (25 lb)-16 kg (35 lb)).    HEART: RRR, no murmur  LUNGS: CTA bilaterally  Fetal Heart Tones: Cat 1  Ctx: q15m  Abd: Soft and nontender.  Baby cephalic presentation by bedside ultrasound (peformed by me).    Labs  Admission labs not yet done.    Group B Strep PCR   Date Value Ref Range Status   07/12/2022 Negative Negative Final     Comment:     Presumed negative for Streptococcus agalactiae (Group B Streptococcus) or the number of organisms may be below the limit of detection of the assay.      Antibody Screen   Date Value Ref Range Status   01/19/2022 Negative Negative Final     Hepatitis B Surface Antigen   Date Value Ref Range Status   01/19/2022 Nonreactive Nonreactive Final     Hemoglobin   Date Value Ref Range Status   06/02/2022 10.9 (L) 11.7 - 15.7 g/dL Final      No visits with results within 2 Day(s) from this visit.   Latest known visit with results is:   Prenatal Office Visit on 07/29/2022   Component Date Value Ref Range Status     SARS CoV2 PCR 07/29/2022 Negative  Negative Final    NEGATIVE: SARS-CoV-2 (COVID-19) RNA not detected, presumed negative.        Completed by:   Madelin Smith MD  [facility]  7/31/2022 8:31 PM   used: Not needed.   "

## 2022-08-01 NOTE — PLAN OF CARE
Problem: Plan of Care - These are the overarching goals to be used throughout the patient stay.    Goal: Plan of Care Review/Shift Note  Description: The Plan of Care Review/Shift note should be completed every shift.  The Outcome Evaluation is a brief statement about your assessment that the patient is improving, declining, or no change.  This information will be displayed automatically on your shift note.  Outcome: Ongoing, Progressing  Flowsheets (Taken 7/31/2022 2117)  Plan of Care Reviewed With:   patient   significant other     Problem: Plan of Care - These are the overarching goals to be used throughout the patient stay.    Goal: Optimal Comfort and Wellbeing  Outcome: Ongoing, Progressing  Intervention: Provide Person-Centered Care  Recent Flowsheet Documentation  Taken 7/31/2022 2055 by Kaya Aburto RN  Trust Relationship/Rapport:   care explained   choices provided   emotional support provided   empathic listening provided   questions answered   questions encouraged   reassurance provided   thoughts/feelings acknowledged     Problem: Change in Fetal Wellbeing (Labor)  Goal: Stable Fetal Wellbeing  Outcome: Ongoing, Progressing  Intervention: Promote and Monitor Fetal Wellbeing  Recent Flowsheet Documentation  Taken 7/31/2022 2055 by Kaya Aburto RN  Body Position: sitting up in bed     Problem: Labor Pain (Labor)  Goal: Acceptable Pain Control  Outcome: Ongoing, Progressing   VSS, , category 1 tracing, irregular ctx with uterine irritability. Dr Smith saw pt earlier and verified baby was vertex by US. Pt requested that she doesn't receive more Cytotec now because she doesn't want to deliver today. Prefers delivery tomorrow. Dr Smith stated that was ok and that her dose could be given at 2300. FOB is present and supportive. Aware of POC and oriented to room, denies questions or concerns.

## 2022-08-01 NOTE — PROGRESS NOTES
Dr. Smith called unit for an update. Would like RN to do SVE at 0900 prior to next cytotec dose and call for orders.    Meron Edwards RN

## 2022-08-01 NOTE — PLAN OF CARE
Problem: Plan of Care - These are the overarching goals to be used throughout the patient stay.    Goal: Plan of Care Review/Shift Note  Description: The Plan of Care Review/Shift note should be completed every shift.  The Outcome Evaluation is a brief statement about your assessment that the patient is improving, declining, or no change.  This information will be displayed automatically on your shift note.  8/1/2022 0159 by Kaya Aburto RN  Outcome: Ongoing, Progressing  7/31/2022 2117 by Kaya Aburto RN  Outcome: Ongoing, Progressing  Flowsheets (Taken 7/31/2022 2117)  Plan of Care Reviewed With:   patient   significant other     Problem: Plan of Care - These are the overarching goals to be used throughout the patient stay.    Goal: Optimal Comfort and Wellbeing  8/1/2022 0159 by Kaya Aburto RN  Outcome: Ongoing, Progressing  7/31/2022 2117 by Kaya Aburto RN  Outcome: Ongoing, Progressing  Intervention: Provide Person-Centered Care  Recent Flowsheet Documentation  Taken 8/1/2022 0055 by Kaya Aburto RN  Trust Relationship/Rapport:   care explained   choices provided   emotional support provided   empathic listening provided   questions answered   questions encouraged   reassurance provided   thoughts/feelings acknowledged  Taken 7/31/2022 2055 by Kaya Aburto RN  Trust Relationship/Rapport:   care explained   choices provided   emotional support provided   empathic listening provided   questions answered   questions encouraged   reassurance provided   thoughts/feelings acknowledged     Problem: Change in Fetal Wellbeing (Labor)  Goal: Stable Fetal Wellbeing  8/1/2022 0159 by Kaya Aburto RN  Outcome: Ongoing, Progressing  7/31/2022 2121 by Kaya Aburto RN  Outcome: Ongoing, Progressing  Intervention: Promote and Monitor Fetal Wellbeing  Recent Flowsheet Documentation  Taken 8/1/2022 0055 by Kaya Aburto RN  Body Position: position changed independently  Taken 7/31/2022 2055 by  Kaya Aburto, RN  Body Position: sitting up in bed     Problem: Labor Pain (Labor)  Goal: Acceptable Pain Control  8/1/2022 0159 by Kaya Aburto, RN  Outcome: Ongoing, Progressing  7/31/2022 2121 by Kaya Aburto, RN  Outcome: Ongoing, Progressing   VSS, 135, category 1 tracing. Ctx are mild and q 2-4 min with a soft resting tone. Pt denies pain and is sleeping well. FOB is present and supportive. IOL for FGR, pt also has TCP.

## 2022-08-04 ENCOUNTER — ANCILLARY PROCEDURE (OUTPATIENT)
Dept: ULTRASOUND IMAGING | Facility: HOSPITAL | Age: 32
End: 2022-08-04
Attending: OBSTETRICS & GYNECOLOGY
Payer: COMMERCIAL

## 2022-08-04 ENCOUNTER — OFFICE VISIT (OUTPATIENT)
Dept: MATERNAL FETAL MEDICINE | Facility: HOSPITAL | Age: 32
End: 2022-08-04
Attending: OBSTETRICS & GYNECOLOGY
Payer: COMMERCIAL

## 2022-08-04 ENCOUNTER — TELEPHONE (OUTPATIENT)
Dept: FAMILY MEDICINE | Facility: CLINIC | Age: 32
End: 2022-08-04

## 2022-08-04 DIAGNOSIS — O36.5990 PREGNANCY AFFECTED BY FETAL GROWTH RESTRICTION: ICD-10-CM

## 2022-08-04 PROCEDURE — 59025 FETAL NON-STRESS TEST: CPT

## 2022-08-04 PROCEDURE — 59025 FETAL NON-STRESS TEST: CPT | Mod: 26 | Performed by: OBSTETRICS & GYNECOLOGY

## 2022-08-04 PROCEDURE — 76820 UMBILICAL ARTERY ECHO: CPT

## 2022-08-04 PROCEDURE — 76815 OB US LIMITED FETUS(S): CPT | Mod: 26 | Performed by: OBSTETRICS & GYNECOLOGY

## 2022-08-04 PROCEDURE — 99207 PR NO CHARGE LOS: CPT | Performed by: OBSTETRICS & GYNECOLOGY

## 2022-08-04 PROCEDURE — 76820 UMBILICAL ARTERY ECHO: CPT | Mod: 26 | Performed by: OBSTETRICS & GYNECOLOGY

## 2022-08-04 NOTE — TELEPHONE ENCOUNTER
Forms/Letter Request    Type of form/letter: FMLA - Unknown    Have you been seen for this request: Yes, pt saw TS on 7/29    Do we have the form/letter: Yes: I will put the form in TS's blue folder    When is form/letter needed by: ASAP    How would you like the form/letter returned: Fax to [facility] [facility] at [phone]    Patient Notified form requests are processed in 3-5 business days:Yes    Could we send this information to you in [employer] or would you prefer to receive a phone call?:   Patient would prefer a phone call   Okay to leave a detailed message?: Yes at Home number on file [phone] (home)

## 2022-08-04 NOTE — NURSING NOTE
NST Performed due to fetal growth restriction. Pt endorses active fetal movements, no LOF/bleeding. Kick counts reviewed, pt verbalized understanding. IOL scheduled for 8/8/22. Dr. Richmond reviewed efm tracing. See NST/BPP Doc Flowsheet tab.

## 2022-08-08 PROBLEM — Z36.89 ENCOUNTER FOR TRIAGE IN PREGNANT PATIENT: Status: ACTIVE | Noted: 2022-08-08

## 2022-08-08 PROBLEM — Z34.90 PREGNANT: Status: ACTIVE | Noted: 2022-08-08

## 2022-08-10 PROBLEM — Z34.90 PREGNANT: Status: RESOLVED | Noted: 2022-08-08 | Resolved: 2022-08-10

## 2022-08-10 PROBLEM — Z36.89 ENCOUNTER FOR TRIAGE IN PREGNANT PATIENT: Status: RESOLVED | Noted: 2022-08-08 | Resolved: 2022-08-10

## 2022-08-11 ENCOUNTER — MEDICAL CORRESPONDENCE (OUTPATIENT)
Dept: HEALTH INFORMATION MANAGEMENT | Facility: CLINIC | Age: 32
End: 2022-08-11

## 2022-09-18 DIAGNOSIS — Z76.0 ENCOUNTER FOR MEDICATION REFILL: Primary | ICD-10-CM

## 2022-09-19 RX ORDER — DOCUSATE SODIUM 100 MG/1
100 CAPSULE, LIQUID FILLED ORAL 2 TIMES DAILY PRN
Qty: 30 CAPSULE | Refills: 0 | Status: SHIPPED | OUTPATIENT
Start: 2022-09-19 | End: 2022-10-12

## 2022-10-02 ENCOUNTER — HEALTH MAINTENANCE LETTER (OUTPATIENT)
Age: 32
End: 2022-10-02

## 2022-10-07 ENCOUNTER — TELEPHONE (OUTPATIENT)
Dept: FAMILY MEDICINE | Facility: CLINIC | Age: 32
End: 2022-10-07

## 2022-10-07 DIAGNOSIS — K59.00 CONSTIPATION, UNSPECIFIED CONSTIPATION TYPE: ICD-10-CM

## 2022-10-07 DIAGNOSIS — K64.4 EXTERNAL HEMORRHOIDS: ICD-10-CM

## 2022-10-07 RX ORDER — HYDROCORTISONE 25 MG/G
CREAM TOPICAL 2 TIMES DAILY PRN
Qty: 30 G | Refills: 0 | OUTPATIENT
Start: 2022-10-07

## 2022-10-07 RX ORDER — BISACODYL 10 MG
10 SUPPOSITORY, RECTAL RECTAL DAILY PRN
Qty: 9 SUPPOSITORY | Refills: 0 | OUTPATIENT
Start: 2022-10-07

## 2022-10-07 NOTE — TELEPHONE ENCOUNTER
Reason for Call:  Appointment Request    Patient requesting this type of appt:  Pregnancy     Requested provider: Madelin Smith    Reason patient unable to be scheduled: Not with their preferred provider    When does patient want to be seen/preferred time: October 12th     Comments: Her daughter Liliane has an appointment on October 12th with and Dianne would to have her 6 week post partum appointment on the same day with Dr. Madelin Smith.    Could we send this information to you in [facility] [place] or would you prefer to receive a phone call?:   Patient would prefer a phone call   Okay to leave a detailed message?: Yes at Cell number on file:    Telephone Information:   Mobile [phone]       Call taken on 10/7/2022 at 10:38 AM by Gerri Lopez

## 2022-10-07 NOTE — TELEPHONE ENCOUNTER
Outpatient Medication Detail     Disp Refills Start End NADIA   bisacodyl (DULCOLAX) 10 MG suppository (Discontinued) 10 suppository 0 12/31/2021 1/7/2022 --   Sig - Route: Place 1 suppository (10 mg) rectally daily as needed for constipation - Rectal   Patient not taking: Reported on 1/7/2022            Outpatient Medication Detail     Disp Refills Start End NADIA   hydrocortisone (PROCTOCORT) 10 % rectal foam (Discontinued) 15 g 0 12/31/2021 1/7/2022 --   Sig - Route: Place 1 applicator rectally 2 times daily - Rectal   Patient not taking: Reported on 1/7/2022        Sent

## 2022-10-10 NOTE — TELEPHONE ENCOUNTER
Called pt and left a detailed message regarding appt. RN will also sent a message via Sumerian.    Josue Miner BSN RN  [facility]

## 2022-10-12 ENCOUNTER — PRENATAL OFFICE VISIT (OUTPATIENT)
Dept: FAMILY MEDICINE | Facility: CLINIC | Age: 32
End: 2022-10-12
Payer: COMMERCIAL

## 2022-10-12 VITALS
DIASTOLIC BLOOD PRESSURE: 58 MMHG | HEART RATE: 78 BPM | RESPIRATION RATE: 14 BRPM | TEMPERATURE: 98.4 F | BODY MASS INDEX: 19.65 KG/M2 | SYSTOLIC BLOOD PRESSURE: 94 MMHG | HEIGHT: 62 IN | OXYGEN SATURATION: 98 % | WEIGHT: 106.8 LBS

## 2022-10-12 DIAGNOSIS — L98.8 SKIN MACULE: ICD-10-CM

## 2022-10-12 DIAGNOSIS — K64.4 EXTERNAL HEMORRHOIDS: ICD-10-CM

## 2022-10-12 DIAGNOSIS — L60.9 FINGERNAIL PROBLEM: ICD-10-CM

## 2022-10-12 DIAGNOSIS — L91.0 KELOID SCAR: ICD-10-CM

## 2022-10-12 DIAGNOSIS — Z12.4 CERVICAL CANCER SCREENING: ICD-10-CM

## 2022-10-12 PROBLEM — O36.5990 PREGNANCY AFFECTED BY FETAL GROWTH RESTRICTION: Status: RESOLVED | Noted: 2022-07-19 | Resolved: 2022-10-12

## 2022-10-12 PROBLEM — O36.5990 INTRAUTERINE GROWTH RESTRICTION (IUGR) AFFECTING CARE OF MOTHER: Status: RESOLVED | Noted: 2022-07-31 | Resolved: 2022-10-12

## 2022-10-12 PROCEDURE — G0145 SCR C/V CYTO,THINLAYER,RESCR: HCPCS | Performed by: FAMILY MEDICINE

## 2022-10-12 PROCEDURE — 99207 PR POST PARTUM EXAM: CPT | Mod: 25 | Performed by: FAMILY MEDICINE

## 2022-10-12 PROCEDURE — 87624 HPV HI-RISK TYP POOLED RSLT: CPT | Performed by: FAMILY MEDICINE

## 2022-10-12 RX ORDER — HYDROCORTISONE 25 MG/G
CREAM TOPICAL
Qty: 30 G | Refills: 4 | Status: SHIPPED | OUTPATIENT
Start: 2022-10-12 | End: 2022-12-08

## 2022-10-12 RX ORDER — PNV NO.95/FERROUS FUM/FOLIC AC 28MG-0.8MG
1 TABLET ORAL DAILY
Qty: 100 CAPSULE | Refills: 3 | Status: SHIPPED | OUTPATIENT
Start: 2022-10-12 | End: 2024-09-20

## 2022-10-12 RX ORDER — HYDROCORTISONE 25 MG/G
CREAM TOPICAL
COMMUNITY
Start: 2022-01-13 | End: 2022-10-12

## 2022-10-12 NOTE — PROGRESS NOTES
6 Week Postpartum Check  [facility]  Date of Service: 10/16/2022      Assessment/Plan:     Postpartum exam  Anemia:  Hemoglobin mildly low postpartum but she reports Normal hemoglobin at [facility]  Breastfeeding/Bottle feeding:  Pt is breasfeeding, discussed how to support  Contraception:   None -- is she planning pregnancy? no  Depression:   Denies  Exercise:   Encouraged increasing exercise based on how she is feeling.  Healthcare maintenance:   Needs Pap Smear; done today  Immunizations:    Immunizations needed; declines today (flu and COVID)    Lactating mother  - Prenatal MV-Min-Fe Fum-FA-DHA (PRENATAL MULTIVITAMIN PLUS DHA) 27-0.8-250 MG CAPS  Dispense: 100 capsule; Refill: 3    Cervical cancer screening  - Pap Screen with HPV - recommended age 30 - 65 years  - HPV Hold (Lab Only)    External hemorrhoids  Stressed need to get stools soft for healing, okay hydrocortisone prn.  To be seen with any severe symptoms; I suspect she may have thrombosed external hemorrhoid last week.   - hydrocortisone, Perianal, (ANUSOL-HC) 2.5 % cream  Dispense: 30 g; Refill: 4  - psyllium (METAMUCIL SMOOTH TEXTURE) 28 % packet  Dispense: 30 each; Refill: 11    Keloid scar  Skin macule  Fingernail problem  - Adult Dermatology Referral           No follow-ups on file.     Subjective:      Dianne Ochoa is a 32 year old female who presents for a postpartum visit.   She is about 5-8 weeks postpartum following a spontaneous vaginal delivery.   I have fully reviewed the prenatal and intrapartum course.   Delivery notes below  Postpartum course has been Unremarkable.  Baby's course has been Uncomplicated.  Periods:  None Patient's last menstrual period was 10/28/2021 (exact date).   Bowel or bladder concerns: none    Very painful hemorrhoids last week.  Hard stools  Some bleeding    2 fingernails dark, nonpainful    Wants dark spot on left cheek, spot on back removed if possible.        Recent hemoglobin results    Hemoglobin  "  Date Value Ref Range Status   2022 10.8 (L) 11.7 - 15.7 g/dL Final   2022 12.9 11.7 - 15.7 g/dL Final   2022 11.1 (L) 11.7 - 15.7 g/dL Final   2022 10.9 (L) 11.7 - 15.7 g/dL Final         The following portions of the patient's history were reviewed and updated as appropriate: allergies, current medications and problem list     OB History    Para Term  AB Living   7 5 4 1 2 5   SAB IAB Ectopic Multiple Live Births   2 0 0 0 5      # Outcome Date GA Lbr Alfred/2nd Weight Sex Delivery Anes PTL Lv   7 Term 22 40w4d 01::02 2.79 kg (6 lb 2.4 oz) F Vag-Spont None N CHAGO      Birth Comments: Induced for concern of IUGR      Name: Liliane Au      Apgar1: 8  Apgar5: 9   6  01/17/15 36w2d 06:12 :15 2.48 kg (5 lb 7.5 oz) F Vag-Spont None N CHAGO      Complications: Placenta Previa      Name: MATILDE,FEMALE-OSMAN      Apgar1: 7  Apgar5: 8   5 2012           4 2012           3 Term 09/08/10 40w0d  2.41 kg (5 lb 5 oz) F Vag-Spont   CHAGO      Name: Radha Angely   2 Term 10/10/09 40w0d  2.835 kg (6 lb 4 oz) F Vag-Spont   CHAGO      Name: Cosme Zambranoong   1 Term 10/16/08 40w0d  2.892 kg (6 lb 6 oz) F Vag-Spont   CHAGO      Name: Leticia Au     Information for the patient's :  Liliane Au [[id number]]   Liliane Au       Objective:        Wt Readings from Last 3 Encounters:   10/12/22 48.4 kg (106 lb 12.8 oz)   22 56.7 kg (125 lb)   22 56.7 kg (125 lb)     BP 94/58   Pulse 78   Temp 98.4  F (36.9  C) (Oral)   Resp 14   Ht 1.569 m (5' 1.77\")   Wt 48.4 kg (106 lb 12.8 oz)   LMP 10/28/2021 (Exact Date)   SpO2 98%   BMI 19.68 kg/m          Physical Exam:  General Appearance: Alert, cooperative, no distress, appears stated age  Neck: Supple, no adenopathy;  thyroid: not enlarged, symmetric, no tenderness/mass/nodules  Lungs: Clear to auscultation bilaterally, respirations unlabored  Heart: Regular rate and rhythm, S1 and S2 normal, no murmur, rub, or " gallop  Abdomen: Soft, non-tender, no masses, no organomegaly  Pelvic:  Normal female external genitalia, vaginal mucosa pink, moist, well rugated and normal cervix, adnexae, and uterus without masses. Normal vaginal discharge   Extremities: Extremities normal, atraumatic, no cyanosis or edema  Skin: brown macule left cheek, hypertrophic purple liner papule/patch back, darkening on 1st and second medial fingernails of right hand.  Lymph nodes: Cervical and axillary nodes normal  Neurologic: Normal

## 2022-10-12 NOTE — PATIENT INSTRUCTIONS
Some examples of high iron foods are:Beef, poultry (especially dark meat), salmon, tuna, liver,  whole grains (like breads and pasta made with whole wheat flour), fortified cereals (check the labels), dried fruits, dried beans, foods prepared in cast ironskillets.

## 2022-10-14 LAB
BKR LAB AP GYN ADEQUACY: NORMAL
BKR LAB AP GYN INTERPRETATION: NORMAL
BKR LAB AP HPV REFLEX: NORMAL
BKR LAB AP PREVIOUS ABNORMAL: NORMAL
PATH REPORT.COMMENTS IMP SPEC: NORMAL
PATH REPORT.COMMENTS IMP SPEC: NORMAL
PATH REPORT.RELEVANT HX SPEC: NORMAL

## 2022-10-18 LAB
HUMAN PAPILLOMA VIRUS 16 DNA: NEGATIVE
HUMAN PAPILLOMA VIRUS 18 DNA: NEGATIVE
HUMAN PAPILLOMA VIRUS FINAL DIAGNOSIS: NORMAL
HUMAN PAPILLOMA VIRUS OTHER HR: NEGATIVE

## 2022-10-24 ENCOUNTER — TRANSFERRED RECORDS (OUTPATIENT)
Dept: HEALTH INFORMATION MANAGEMENT | Facility: CLINIC | Age: 32
End: 2022-10-24

## 2022-12-01 ENCOUNTER — TELEPHONE (OUTPATIENT)
Dept: FAMILY MEDICINE | Facility: CLINIC | Age: 32
End: 2022-12-01

## 2022-12-01 NOTE — TELEPHONE ENCOUNTER
S-(situation): pt called stating that she is having pain in the left wrist    B-(background): started back in September. Pt stated that she's been carrying her new born so that could be the cause    A-(assessment): pain at a 5/10. Can move wrist but pain increase to 6 or 7.   - No pain without movement or activities.  - looks normal  - at first started with a clicking sound and pt stated that wrist was not in the right position so she had to move it back to it's normal position. No more clicking sound but pain is worse.  - worsen from when it started  - pt has not trued any medication for pain.    R-(recommendations): be seen by a provider. Schedule pt appt with Dr. Heath on 12/5/22. Pt reccommended go to Urgent Care and ED if symptoms worsen in the meantime. Pt agrees.      Josue Miner, BSN RN  [facility]

## 2022-12-05 ENCOUNTER — OFFICE VISIT (OUTPATIENT)
Dept: FAMILY MEDICINE | Facility: CLINIC | Age: 32
End: 2022-12-05
Payer: COMMERCIAL

## 2022-12-05 VITALS
OXYGEN SATURATION: 97 % | SYSTOLIC BLOOD PRESSURE: 86 MMHG | RESPIRATION RATE: 14 BRPM | HEART RATE: 81 BPM | WEIGHT: 111.12 LBS | BODY MASS INDEX: 20.45 KG/M2 | TEMPERATURE: 98.3 F | DIASTOLIC BLOOD PRESSURE: 50 MMHG | HEIGHT: 62 IN

## 2022-12-05 DIAGNOSIS — Z23 NEED FOR VACCINATION: ICD-10-CM

## 2022-12-05 DIAGNOSIS — M25.532 LEFT WRIST PAIN: Primary | ICD-10-CM

## 2022-12-05 PROCEDURE — 99213 OFFICE O/P EST LOW 20 MIN: CPT | Performed by: FAMILY MEDICINE

## 2022-12-05 RX ORDER — IBUPROFEN 200 MG
400 TABLET ORAL 3 TIMES DAILY
Qty: 30 TABLET | Refills: 0 | Status: SHIPPED | OUTPATIENT
Start: 2022-12-05 | End: 2022-12-10

## 2022-12-05 NOTE — PROGRESS NOTES
"  Assessment & Plan     Need for vaccination  Declines flu and covid vaccines today. Encouraged her to get these to protect her infant, she still declines at this time.     Left wrist pain  Trial of NSAIDs ibuprofen 400mg tid with food for 5 days. She would also like to try a splint, which was given in clinic. Follow up not improving in a few weeks, but sooner if worse or new symptoms.   - ibuprofen (ADVIL/MOTRIN) 200 MG tablet  Dispense: 30 tablet; Refill: 0  - Wrist/Arm Supplies Order                   No follow-ups on file.    Macarena Heath MD  [facility] CHRISSY Dean is a 32 year old, presenting for the following health issues:  Wrist Pain (Left wrist )    Left wrist pain for 2-3 months. No injury. Started with thumb \"catching\" in flexion at the MCP joint, and she had to forcefully extend it with the other hand. The catching is no longer happening, but she has has pain at the radial aspect of the wrist. No swelling, no erythema. Feels warm at times. Sometimes pain radiates slightly up the forearm, radial aspect. No weakness, numbness or tingling. Often bothers her at night    Right handed.     Has infant 3-4 mos old          Review of Systems         Objective    BP (!) 86/50   Pulse 81   Temp 98.3  F (36.8  C) (Oral)   Resp 14   Ht 1.57 m (5' 1.81\")   Wt 50.4 kg (111 lb 1.9 oz)   LMP 11/18/2022 (Exact Date)   SpO2 97%   BMI 20.45 kg/m    Body mass index is 20.45 kg/m .  Physical Exam   Gen: NAD, appears well  MSK: Left wrist without swelling, erythema. Slight warmth compared to right wrist. Slightly tender over radial aspect. Full ROM. Normal light touch sensation left hand and wrist/forearm. Normal strength wrist and hand.                       "

## 2022-12-30 ENCOUNTER — TRANSFERRED RECORDS (OUTPATIENT)
Dept: HEALTH INFORMATION MANAGEMENT | Facility: CLINIC | Age: 32
End: 2022-12-30

## 2023-12-30 ENCOUNTER — HEALTH MAINTENANCE LETTER (OUTPATIENT)
Age: 33
End: 2023-12-30

## 2024-09-20 ENCOUNTER — OFFICE VISIT (OUTPATIENT)
Dept: FAMILY MEDICINE | Facility: CLINIC | Age: 34
End: 2024-09-20
Payer: COMMERCIAL

## 2024-09-20 VITALS
BODY MASS INDEX: 21.16 KG/M2 | HEIGHT: 62 IN | WEIGHT: 115 LBS | TEMPERATURE: 98.2 F | HEART RATE: 86 BPM | DIASTOLIC BLOOD PRESSURE: 70 MMHG | RESPIRATION RATE: 18 BRPM | SYSTOLIC BLOOD PRESSURE: 111 MMHG | OXYGEN SATURATION: 99 %

## 2024-09-20 DIAGNOSIS — K64.4 EXTERNAL HEMORRHOIDS: Primary | ICD-10-CM

## 2024-09-20 PROCEDURE — 99213 OFFICE O/P EST LOW 20 MIN: CPT | Performed by: PHYSICIAN ASSISTANT

## 2024-09-20 RX ORDER — PNV NO.95/FERROUS FUM/FOLIC AC 28MG-0.8MG
1 TABLET ORAL DAILY
Qty: 100 CAPSULE | Refills: 3 | Status: SHIPPED | OUTPATIENT
Start: 2024-09-20

## 2024-09-20 RX ORDER — HYDROCORTISONE 25 MG/G
CREAM TOPICAL 2 TIMES DAILY PRN
Qty: 30 G | Refills: 0 | Status: SHIPPED | OUTPATIENT
Start: 2024-09-20

## 2024-09-20 ASSESSMENT — PATIENT HEALTH QUESTIONNAIRE - PHQ9
SUM OF ALL RESPONSES TO PHQ QUESTIONS 1-9: 17
10. IF YOU CHECKED OFF ANY PROBLEMS, HOW DIFFICULT HAVE THESE PROBLEMS MADE IT FOR YOU TO DO YOUR WORK, TAKE CARE OF THINGS AT HOME, OR GET ALONG WITH OTHER PEOPLE: VERY DIFFICULT
SUM OF ALL RESPONSES TO PHQ QUESTIONS 1-9: 17

## 2024-09-20 NOTE — PATIENT INSTRUCTIONS
1 capful of Miralax per day until stools are soft  Sitz baths 2-3 times a day for 20 minutes  Rectal cream twice a day

## 2024-09-22 RX ORDER — PRENATAL WITH FERROUS FUM AND FOLIC ACID 3080; 920; 120; 400; 22; 1.84; 3; 20; 10; 1; 12; 200; 27; 25; 2 [IU]/1; [IU]/1; MG/1; [IU]/1; MG/1; MG/1; MG/1; MG/1; MG/1; MG/1; UG/1; MG/1; MG/1; MG/1; MG/1
TABLET ORAL
Qty: 30 TABLET | Refills: 3 | OUTPATIENT
Start: 2024-09-22

## 2024-09-22 NOTE — TELEPHONE ENCOUNTER
Pharmacy requested refills that are already active on file. Refused request to pharmacy.   Patient/Caregiver provided printed discharge information.

## 2024-10-12 ENCOUNTER — HOSPITAL ENCOUNTER (INPATIENT)
Facility: HOSPITAL | Age: 34
LOS: 2 days | Discharge: HOME | End: 2024-10-14
Attending: EMERGENCY MEDICINE | Admitting: FAMILY MEDICINE
Payer: COMMERCIAL

## 2024-10-12 ENCOUNTER — APPOINTMENT (OUTPATIENT)
Dept: ULTRASOUND IMAGING | Facility: HOSPITAL | Age: 34
End: 2024-10-12
Attending: EMERGENCY MEDICINE
Payer: COMMERCIAL

## 2024-10-12 DIAGNOSIS — D72.819 LEUKOPENIA, UNSPECIFIED TYPE: ICD-10-CM

## 2024-10-12 DIAGNOSIS — R50.9 FEBRILE ILLNESS: ICD-10-CM

## 2024-10-12 DIAGNOSIS — R74.8 ELEVATED LIVER ENZYMES: ICD-10-CM

## 2024-10-12 DIAGNOSIS — D69.6 THROMBOCYTOPENIA (H): ICD-10-CM

## 2024-10-12 LAB
ALBUMIN SERPL BCG-MCNC: 3.4 G/DL (ref 3.5–5.2)
ALBUMIN SERPL BCG-MCNC: 4.1 G/DL (ref 3.5–5.2)
ALBUMIN UR-MCNC: 50 MG/DL
ALP SERPL-CCNC: 325 U/L (ref 40–150)
ALP SERPL-CCNC: 330 U/L (ref 40–150)
ALT SERPL W P-5'-P-CCNC: 268 U/L (ref 0–50)
ALT SERPL W P-5'-P-CCNC: 272 U/L (ref 0–50)
AMPHETAMINES UR QL SCN: NORMAL
ANION GAP SERPL CALCULATED.3IONS-SCNC: 13 MMOL/L (ref 7–15)
APAP SERPL-MCNC: 6.3 UG/ML (ref 10–30)
APAP SERPL-MCNC: <5 UG/ML (ref 10–30)
APPEARANCE UR: CLEAR
AST SERPL W P-5'-P-CCNC: 430 U/L (ref 0–45)
AST SERPL W P-5'-P-CCNC: 438 U/L (ref 0–45)
BACTERIA #/AREA URNS HPF: ABNORMAL /HPF
BARBITURATES UR QL SCN: NORMAL
BASOPHILS # BLD AUTO: 0 10E3/UL (ref 0–0.2)
BASOPHILS NFR BLD AUTO: 0 %
BENZODIAZ UR QL SCN: NORMAL
BILIRUB DIRECT SERPL-MCNC: 0.63 MG/DL (ref 0–0.3)
BILIRUB DIRECT SERPL-MCNC: 0.98 MG/DL (ref 0–0.3)
BILIRUB SERPL-MCNC: 0.9 MG/DL
BILIRUB SERPL-MCNC: 1.1 MG/DL
BILIRUB UR QL STRIP: NEGATIVE
BUN SERPL-MCNC: 6.9 MG/DL (ref 6–20)
BZE UR QL SCN: NORMAL
CALCIUM SERPL-MCNC: 8.5 MG/DL (ref 8.8–10.4)
CANNABINOIDS UR QL SCN: NORMAL
CHLORIDE SERPL-SCNC: 97 MMOL/L (ref 98–107)
COLOR UR AUTO: YELLOW
CREAT SERPL-MCNC: 0.8 MG/DL (ref 0.51–0.95)
CRP SERPL-MCNC: 159.4 MG/L
EGFRCR SERPLBLD CKD-EPI 2021: >90 ML/MIN/1.73M2
EOSINOPHIL # BLD AUTO: 0 10E3/UL (ref 0–0.7)
EOSINOPHIL NFR BLD AUTO: 0 %
ERYTHROCYTE [DISTWIDTH] IN BLOOD BY AUTOMATED COUNT: 12.2 % (ref 10–15)
FENTANYL UR QL: NORMAL
FLUAV RNA SPEC QL NAA+PROBE: NEGATIVE
FLUBV RNA RESP QL NAA+PROBE: NEGATIVE
GLUCOSE SERPL-MCNC: 115 MG/DL (ref 70–99)
GLUCOSE UR STRIP-MCNC: NEGATIVE MG/DL
GROUP A STREP BY PCR: NOT DETECTED
HAV IGM SERPL QL IA: NONREACTIVE
HBV CORE IGM SERPL QL IA: NONREACTIVE
HBV SURFACE AG SERPL QL IA: NONREACTIVE
HCG UR QL: NEGATIVE
HCO3 SERPL-SCNC: 23 MMOL/L (ref 22–29)
HCT VFR BLD AUTO: 42.1 % (ref 35–47)
HCV AB SERPL QL IA: NONREACTIVE
HGB BLD-MCNC: 13.7 G/DL (ref 11.7–15.7)
HGB UR QL STRIP: ABNORMAL
HOLD SPECIMEN: NORMAL
IMM GRANULOCYTES # BLD: 0 10E3/UL
IMM GRANULOCYTES NFR BLD: 0 %
INR PPP: 1.32 (ref 0.85–1.15)
KETONES UR STRIP-MCNC: NEGATIVE MG/DL
LACTATE SERPL-SCNC: 1.9 MMOL/L (ref 0.7–2)
LDH SERPL L TO P-CCNC: 684 U/L (ref 0–250)
LEUKOCYTE ESTERASE UR QL STRIP: NEGATIVE
LIPASE SERPL-CCNC: 23 U/L (ref 13–60)
LYMPHOCYTES # BLD AUTO: 0.3 10E3/UL (ref 0.8–5.3)
LYMPHOCYTES NFR BLD AUTO: 13 %
MAGNESIUM SERPL-MCNC: 2.1 MG/DL (ref 1.7–2.3)
MCH RBC QN AUTO: 28 PG (ref 26.5–33)
MCHC RBC AUTO-ENTMCNC: 32.5 G/DL (ref 31.5–36.5)
MCV RBC AUTO: 86 FL (ref 78–100)
MONOCYTES # BLD AUTO: 0.1 10E3/UL (ref 0–1.3)
MONOCYTES NFR BLD AUTO: 3 %
NEUTROPHILS # BLD AUTO: 2 10E3/UL (ref 1.6–8.3)
NEUTROPHILS NFR BLD AUTO: 83 %
NITRATE UR QL: NEGATIVE
NRBC # BLD AUTO: 0 10E3/UL
NRBC BLD AUTO-RTO: 0 /100
OPIATES UR QL SCN: NORMAL
PCP QUAL URINE (ROCHE): NORMAL
PH UR STRIP: 6.5 [PH] (ref 5–7)
PHOSPHATE SERPL-MCNC: 1.8 MG/DL (ref 2.5–4.5)
PLATELET # BLD AUTO: 62 10E3/UL (ref 150–450)
POTASSIUM SERPL-SCNC: 3.6 MMOL/L (ref 3.4–5.3)
PROCALCITONIN SERPL IA-MCNC: 0.29 NG/ML
PROT SERPL-MCNC: 6.2 G/DL (ref 6.4–8.3)
PROT SERPL-MCNC: 7.6 G/DL (ref 6.4–8.3)
RBC # BLD AUTO: 4.89 10E6/UL (ref 3.8–5.2)
RBC URINE: 3 /HPF
RSV RNA SPEC NAA+PROBE: NEGATIVE
SALICYLATES SERPL-MCNC: 3.1 MG/DL
SARS-COV-2 RNA RESP QL NAA+PROBE: NEGATIVE
SODIUM SERPL-SCNC: 133 MMOL/L (ref 135–145)
SP GR UR STRIP: 1.02 (ref 1–1.03)
SQUAMOUS EPITHELIAL: 2 /HPF
UROBILINOGEN UR STRIP-MCNC: 2 MG/DL
WBC # BLD AUTO: 2.4 10E3/UL (ref 4–11)
WBC URINE: 5 /HPF
YEAST #/AREA URNS HPF: ABNORMAL /HPF

## 2024-10-12 PROCEDURE — 99207 PR APP CREDIT; MD BILLING SHARED VISIT: CPT | Mod: FS

## 2024-10-12 PROCEDURE — 83690 ASSAY OF LIPASE: CPT | Performed by: EMERGENCY MEDICINE

## 2024-10-12 PROCEDURE — 81025 URINE PREGNANCY TEST: CPT | Performed by: EMERGENCY MEDICINE

## 2024-10-12 PROCEDURE — 80143 DRUG ASSAY ACETAMINOPHEN: CPT | Performed by: EMERGENCY MEDICINE

## 2024-10-12 PROCEDURE — 250N000009 HC RX 250

## 2024-10-12 PROCEDURE — 83605 ASSAY OF LACTIC ACID: CPT | Performed by: EMERGENCY MEDICINE

## 2024-10-12 PROCEDURE — 80307 DRUG TEST PRSMV CHEM ANLYZR: CPT

## 2024-10-12 PROCEDURE — 85025 COMPLETE CBC W/AUTO DIFF WBC: CPT | Performed by: EMERGENCY MEDICINE

## 2024-10-12 PROCEDURE — 120N000001 HC R&B MED SURG/OB

## 2024-10-12 PROCEDURE — 258N000003 HC RX IP 258 OP 636

## 2024-10-12 PROCEDURE — 80179 DRUG ASSAY SALICYLATE: CPT | Performed by: FAMILY MEDICINE

## 2024-10-12 PROCEDURE — 81001 URINALYSIS AUTO W/SCOPE: CPT | Performed by: EMERGENCY MEDICINE

## 2024-10-12 PROCEDURE — 86038 ANTINUCLEAR ANTIBODIES: CPT | Performed by: INTERNAL MEDICINE

## 2024-10-12 PROCEDURE — 87637 SARSCOV2&INF A&B&RSV AMP PRB: CPT | Performed by: EMERGENCY MEDICINE

## 2024-10-12 PROCEDURE — 87468 ANAPLSMA PHGCYTOPHLM AMP PRB: CPT | Performed by: EMERGENCY MEDICINE

## 2024-10-12 PROCEDURE — 250N000011 HC RX IP 250 OP 636

## 2024-10-12 PROCEDURE — 83615 LACTATE (LD) (LDH) ENZYME: CPT

## 2024-10-12 PROCEDURE — 82248 BILIRUBIN DIRECT: CPT | Performed by: EMERGENCY MEDICINE

## 2024-10-12 PROCEDURE — 258N000003 HC RX IP 258 OP 636: Performed by: EMERGENCY MEDICINE

## 2024-10-12 PROCEDURE — 80074 ACUTE HEPATITIS PANEL: CPT | Performed by: EMERGENCY MEDICINE

## 2024-10-12 PROCEDURE — 84145 PROCALCITONIN (PCT): CPT | Performed by: EMERGENCY MEDICINE

## 2024-10-12 PROCEDURE — 84100 ASSAY OF PHOSPHORUS: CPT

## 2024-10-12 PROCEDURE — 87651 STREP A DNA AMP PROBE: CPT | Performed by: EMERGENCY MEDICINE

## 2024-10-12 PROCEDURE — 87486 CHLMYD PNEUM DNA AMP PROBE: CPT

## 2024-10-12 PROCEDURE — 87040 BLOOD CULTURE FOR BACTERIA: CPT

## 2024-10-12 PROCEDURE — 250N000013 HC RX MED GY IP 250 OP 250 PS 637

## 2024-10-12 PROCEDURE — 80143 DRUG ASSAY ACETAMINOPHEN: CPT

## 2024-10-12 PROCEDURE — 86015 ACTIN ANTIBODY EACH: CPT | Performed by: INTERNAL MEDICINE

## 2024-10-12 PROCEDURE — 76705 ECHO EXAM OF ABDOMEN: CPT

## 2024-10-12 PROCEDURE — 86140 C-REACTIVE PROTEIN: CPT | Performed by: EMERGENCY MEDICINE

## 2024-10-12 PROCEDURE — 83735 ASSAY OF MAGNESIUM: CPT

## 2024-10-12 PROCEDURE — 82248 BILIRUBIN DIRECT: CPT

## 2024-10-12 PROCEDURE — 36415 COLL VENOUS BLD VENIPUNCTURE: CPT

## 2024-10-12 PROCEDURE — 99223 1ST HOSP IP/OBS HIGH 75: CPT | Mod: FS | Performed by: FAMILY MEDICINE

## 2024-10-12 PROCEDURE — 86381 MITOCHONDRIAL ANTIBODY EACH: CPT | Performed by: INTERNAL MEDICINE

## 2024-10-12 PROCEDURE — 36415 COLL VENOUS BLD VENIPUNCTURE: CPT | Performed by: EMERGENCY MEDICINE

## 2024-10-12 PROCEDURE — 86645 CMV ANTIBODY IGM: CPT | Performed by: INTERNAL MEDICINE

## 2024-10-12 PROCEDURE — 85610 PROTHROMBIN TIME: CPT | Performed by: EMERGENCY MEDICINE

## 2024-10-12 PROCEDURE — 99285 EMERGENCY DEPT VISIT HI MDM: CPT | Mod: 25

## 2024-10-12 RX ORDER — ONDANSETRON 4 MG/1
4 TABLET, ORALLY DISINTEGRATING ORAL EVERY 6 HOURS PRN
Status: DISCONTINUED | OUTPATIENT
Start: 2024-10-12 | End: 2024-10-14 | Stop reason: HOSPADM

## 2024-10-12 RX ORDER — DOXYCYCLINE 100 MG/10ML
100 INJECTION, POWDER, LYOPHILIZED, FOR SOLUTION INTRAVENOUS EVERY 12 HOURS
Status: DISCONTINUED | OUTPATIENT
Start: 2024-10-12 | End: 2024-10-14

## 2024-10-12 RX ORDER — AMOXICILLIN 250 MG
1 CAPSULE ORAL 2 TIMES DAILY PRN
Status: DISCONTINUED | OUTPATIENT
Start: 2024-10-12 | End: 2024-10-14 | Stop reason: HOSPADM

## 2024-10-12 RX ORDER — SODIUM CHLORIDE 9 MG/ML
INJECTION, SOLUTION INTRAVENOUS CONTINUOUS
Status: DISCONTINUED | OUTPATIENT
Start: 2024-10-12 | End: 2024-10-13

## 2024-10-12 RX ORDER — ACETAMINOPHEN 325 MG/1
325 TABLET ORAL EVERY 4 HOURS PRN
Status: DISCONTINUED | OUTPATIENT
Start: 2024-10-12 | End: 2024-10-14 | Stop reason: HOSPADM

## 2024-10-12 RX ORDER — ONDANSETRON 2 MG/ML
4 INJECTION INTRAMUSCULAR; INTRAVENOUS EVERY 6 HOURS PRN
Status: DISCONTINUED | OUTPATIENT
Start: 2024-10-12 | End: 2024-10-14 | Stop reason: HOSPADM

## 2024-10-12 RX ORDER — CALCIUM CARBONATE 500 MG/1
1000 TABLET, CHEWABLE ORAL 4 TIMES DAILY PRN
Status: DISCONTINUED | OUTPATIENT
Start: 2024-10-12 | End: 2024-10-14 | Stop reason: HOSPADM

## 2024-10-12 RX ORDER — PROCHLORPERAZINE MALEATE 10 MG
10 TABLET ORAL EVERY 6 HOURS PRN
Status: DISCONTINUED | OUTPATIENT
Start: 2024-10-12 | End: 2024-10-14 | Stop reason: HOSPADM

## 2024-10-12 RX ORDER — LIDOCAINE 40 MG/G
CREAM TOPICAL
Status: DISCONTINUED | OUTPATIENT
Start: 2024-10-12 | End: 2024-10-14 | Stop reason: HOSPADM

## 2024-10-12 RX ORDER — PROCHLORPERAZINE 25 MG
25 SUPPOSITORY, RECTAL RECTAL EVERY 12 HOURS PRN
Status: DISCONTINUED | OUTPATIENT
Start: 2024-10-12 | End: 2024-10-14 | Stop reason: HOSPADM

## 2024-10-12 RX ORDER — POLYETHYLENE GLYCOL 3350 17 G/17G
17 POWDER, FOR SOLUTION ORAL 2 TIMES DAILY PRN
Status: DISCONTINUED | OUTPATIENT
Start: 2024-10-12 | End: 2024-10-14 | Stop reason: HOSPADM

## 2024-10-12 RX ORDER — AMOXICILLIN 250 MG
2 CAPSULE ORAL 2 TIMES DAILY PRN
Status: DISCONTINUED | OUTPATIENT
Start: 2024-10-12 | End: 2024-10-14 | Stop reason: HOSPADM

## 2024-10-12 RX ADMIN — SODIUM CHLORIDE 1000 ML: 9 INJECTION, SOLUTION INTRAVENOUS at 20:27

## 2024-10-12 RX ADMIN — SODIUM CHLORIDE 500 ML: 9 INJECTION, SOLUTION INTRAVENOUS at 16:16

## 2024-10-12 RX ADMIN — SODIUM CHLORIDE: 9 INJECTION, SOLUTION INTRAVENOUS at 19:54

## 2024-10-12 RX ADMIN — DOXYCYCLINE 100 MG: 100 INJECTION, POWDER, LYOPHILIZED, FOR SOLUTION INTRAVENOUS at 20:14

## 2024-10-12 RX ADMIN — SODIUM CHLORIDE 1000 ML: 9 INJECTION, SOLUTION INTRAVENOUS at 18:23

## 2024-10-12 RX ADMIN — SODIUM PHOSPHATE, MONOBASIC, MONOHYDRATE AND SODIUM PHOSPHATE, DIBASIC, ANHYDROUS 15 MMOL: 142; 276 INJECTION, SOLUTION INTRAVENOUS at 21:32

## 2024-10-12 RX ADMIN — ACETAMINOPHEN 325 MG: 325 TABLET ORAL at 21:14

## 2024-10-12 ASSESSMENT — ENCOUNTER SYMPTOMS
SEIZURES: 0
DIZZINESS: 0
FREQUENCY: 0
CHILLS: 1
WEAKNESS: 0
FLANK PAIN: 0
FEVER: 1
TINGLING: 0
DEPRESSION: 0
ORTHOPNEA: 0
MYALGIAS: 0
NAUSEA: 1
HEARTBURN: 0
PALPITATIONS: 0
HEMOPTYSIS: 0
SPUTUM PRODUCTION: 0
BLURRED VISION: 0
HEADACHES: 0
NECK PAIN: 1
DIAPHORESIS: 0
WHEEZING: 0
COUGH: 0
WEIGHT LOSS: 0
ABDOMINAL PAIN: 1
SHORTNESS OF BREATH: 0
VOMITING: 1

## 2024-10-12 ASSESSMENT — COLUMBIA-SUICIDE SEVERITY RATING SCALE - C-SSRS
2. HAVE YOU ACTUALLY HAD ANY THOUGHTS OF KILLING YOURSELF IN THE PAST MONTH?: NO
6. HAVE YOU EVER DONE ANYTHING, STARTED TO DO ANYTHING, OR PREPARED TO DO ANYTHING TO END YOUR LIFE?: NO
1. IN THE PAST MONTH, HAVE YOU WISHED YOU WERE DEAD OR WISHED YOU COULD GO TO SLEEP AND NOT WAKE UP?: NO

## 2024-10-12 ASSESSMENT — ACTIVITIES OF DAILY LIVING (ADL)
ADLS_ACUITY_SCORE: 35
ADLS_ACUITY_SCORE: 18
ADLS_ACUITY_SCORE: 35
ADLS_ACUITY_SCORE: 35
ADLS_ACUITY_SCORE: 18

## 2024-10-12 NOTE — PHARMACY-ADMISSION MEDICATION HISTORY
Pharmacist Admission Medication History    Admission medication history is complete. The information provided in this note is only as accurate as the sources available at the time of the update.    Information Source(s): Patient, Family member, Clinic records, Prescription bottles, and CareEverywhere/SureScripts via in-person    Pertinent Information: she has been taking her Excedrin Migraine four times daily since Wednesday. Recommend discontinuing due to potential hepatic toxicity and effects on platelets. She has withheld taking her multivitamin for the previous week due to constipation so may be a less likely source of hepatic insult.      Changes made to PTA medication list:  Added: Excedrin Migraine   Deleted: None  Changed: None    Allergies reviewed with patient and updates made in EHR: yes    Medication History Completed By: Kalyan Low RPH 10/12/2024 5:22 PM    PTA Med List   Medication Sig Last Dose    aspirin-acetaminophen-caffeine (EXCEDRIN MIGRAINE) 250-250-65 MG tablet Take 1 tablet by mouth 4 times daily as needed for headaches. 10/12/2024 at am    hydrocortisone, Perianal, (HYDROCORTISONE) 2.5 % cream Place rectally 2 times daily as needed for hemorrhoids.     Prenatal MV-Min-Fe Fum-FA-DHA (PRENATAL MULTIVITAMIN PLUS DHA) 27-0.8-250 MG CAPS Take 1 tablet by mouth daily. OK to substitute formulary equivalent Past Week at am

## 2024-10-12 NOTE — ED TRIAGE NOTES
Pt reports onset of fever and chills since Tuesday that has progressed to neck stiffness on Wednesday. She started having nausea and vomiting last night and a new rash on arms and legs that she noticed this morning.   Pt took acetaminophen/aspirin/caffeine medicine and her fever has come down. Afebrile in triage.   Denies HA.      Triage Assessment (Adult)       Row Name 10/12/24 1445          Triage Assessment    Airway WDL WDL        Respiratory WDL    Respiratory WDL WDL        Skin Circulation/Temperature WDL    Skin Circulation/Temperature WDL X;temperature     Skin Temperature warm        Cardiac WDL    Cardiac WDL X;rhythm     Pulse Rate & Regularity tachycardic        Peripheral/Neurovascular WDL    Peripheral Neurovascular WDL WDL        Cognitive/Neuro/Behavioral WDL    Cognitive/Neuro/Behavioral WDL WDL

## 2024-10-12 NOTE — H&P
[facility]    History and Physical - Hospitalist Service       Date of Admission:  10/12/2024    Assessment & Plan      Dianne Ochoa is a 34 year old female admitted on 10/12/2024. She has no significant past medical history other than external hemorrhoids presented to the ER for evaluation of fever, chills, nausea, epigastric abdominal pain, neck stiffness and a rash to bilateral upper and lower extremities and her back.  Found to have significant transaminitis with thrombocytopenia, leukopenia with an INR of 1.32.  She also had detectable acetaminophen level, repeat pending.  Tickborne disease panel is pending to rule out anaplasmosis.  Limited Abdominal ultrasound  was unremarkable. GI and infectious disease consultations placed on admission for viral vs anaplasmosis etiology. IV doxycycline 100 mg twice daily initiated for now, reassess tomorrow.    Transaminitis  Epigastric abdominal pain  Nausea, vomiting  Elevated INR  -Elevated LFTs, , , bilirubin 0.9, alkaline phosphatase 325  - INR 1.32  -Patient denies alcohol use or drug use, no recent sick contacts.  Has used OTC combo pill every 5 hours since Tuesday consisting of 250 mg acetaminophen, 250 ASA and 65 mg caffeine  -Initial acetaminophen level 6.3, repeat pending  - limited abdominal sono: Normal limited abdominal ultrasound.     Plan:  -hepatitis panel and tickborne disease panel pending  -Will avoid hepatic harming drugs  -Continue with IV fluids  -Monitor with a.m. CMP, INR  - GI consulted on admission, appreciate assistance and further recommendations    Thrombocytopenia  Leukopenia  -Platelets 62, WBC 2.4  -Hold all anticoagulation for now  -Patient denying active source of bleed  -AM CBC  - blood cultures ordered on admission   - appreciate consultation by ID   -Add doxycycline 100 mg twice daily initiated on admission, reassess in the morning    Hypophosphatemia  -Replace per RN driven protocol  -AM magnesium and  phosphorus    Comorbidities  External hemorrhoids            Diet: Combination Diet Regular Diet Adult  DVT Prophylaxis: Pneumatic Compression Devices  Waldron Catheter: Not present  Lines: None     Cardiac Monitoring: ACTIVE order. Indication: Tachyarrhythmias, acute (48 hours)  Code Status: Full Code    Clinically Significant Risk Factors Present on Admission         # Hyponatremia: Lowest Na = 133 mmol/L in last 2 days, will monitor as appropriate  # Hypochloremia: Lowest Cl = 97 mmol/L in last 2 days, will monitor as appropriate  # Hypocalcemia: Lowest Ca = 8.5 mg/dL in last 2 days, will monitor and replace as appropriate      # Coagulation Defect: INR = 1.32 (Ref range: 0.85 - 1.15) and/or PTT = N/A, will monitor for bleeding  # Thrombocytopenia: Lowest platelets = 62 in last 2 days, will monitor for bleeding                      Disposition Plan     Medically Ready for Discharge: Anticipated in 2-4 Days         The patient's care was discussed with the Attending Physician, Dr. Villagran and the pt .    Venus Foster PA-C  Hospitalist Service  [facility]  Securely message with [employer] (more info)  Text page via [facility] Paging/Directory     ______________________________________________________________________    Chief Complaint   Subjective fever, chills, nausea, vomiting, epigastric abdominal pain and rash    History is obtained from the patient    History of Present Illness   Dianne Ochoa is a 34 year old female who has no significant past medical history other than external hemorrhoids presented to the ER for evaluation of fever, chills, nausea, epigastric abdominal pain, neck stiffness and a rash to bilateral upper and lower extremities and her back.  Patient notes her symptoms started on Tuesday night with subjective fever, chills and neck stiffness.  Developed epigastric abdominal pain and describes it as a burning sensation w/ associated nausea, nonbloody emesis and decreased oral  "intake. Has used OTC combo pill every 5 hours since Tuesday consisting of 250 mg acetaminophen, 250 ASA and 65 mg caffeine, denies taking any prescription medications or additional OTC meds.  She denies any diarrhea, constipation, chest pain, cough, shortness of breath, headaches, vision changes, joint pain but does admit to a rash this morning to her bilateral arms and legs as well as back and states her head feels \"heavy\".  Rash is not painful and does not cause pruritus.  She admits to using new body wash and shampoo earlier this week, denies recent sick contacts, travel, vaccines, or known insect bites.  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  She does not drink alcohol, use recreational drugs or smoke.  She notes her neck pain/stiffness has improved this morning when compared to yesterday.    ED course: Presented afebrile temperature 98.8, heart rate 116, blood pressure 102/62 with oxygen of 100% on room air.  Significant labs on admission showing sodium 133, chloride 97, alkaline phosphatase 325, , , total bilirubin 0.63.  CRP was 159.  Lactic acid, lipase and procalcitonin WNL.  CBC with WBCs 2.4.  Platelets are 62.  Rapid strep was negative, tickborne disease panel is pending.  COVID/flu/RSV negative.  Acetaminophen level 6.3. HCG was negative. She received 500 cc NS bolus in the ER.    At the time of admission, patient was alert and oriented x 4 in no distress on no supplemental oxygen. She was rating her abdominal pain 4/10 at the time of admission. Discussed treatment plan with patient and her  at bedside and they were agreeable.  They confirmed she is full code.      Past Medical History    Past Medical History:   Diagnosis Date    Anemia     Anemia in pregnancy 7/31/2022    Antepartum hemorrhage     Created by Conversion     Group B Streptococcus carrier, delivered, current hospitalization 01/17/2015    H. pylori infection " 03/21/2017    Placenta Previa 2015    Thrombocytopenia (H)     Urogenital trichomoniasis 2017       Past Surgical History   Past Surgical History:   Procedure Laterality Date    HERNIA REPAIR         Prior to Admission Medications   Prior to Admission Medications   Prescriptions Last Dose Informant Patient Reported? Taking?   Prenatal MV-Min-Fe Fum-FA-DHA (PRENATAL MULTIVITAMIN PLUS DHA) 27-0.8-250 MG CAPS Past Week at am  No Yes   Sig: Take 1 tablet by mouth daily. OK to substitute formulary equivalent   aspirin-acetaminophen-caffeine (EXCEDRIN MIGRAINE) 250-250-65 MG tablet 10/12/2024 at am  Yes Yes   Sig: Take 1 tablet by mouth 4 times daily as needed for headaches.   hydrocortisone, Perianal, (HYDROCORTISONE) 2.5 % cream   No Yes   Sig: Place rectally 2 times daily as needed for hemorrhoids.      Facility-Administered Medications: None        Review of Systems    Review of Systems   Constitutional:  Positive for chills, fever (Subjective) and malaise/fatigue. Negative for diaphoresis and weight loss.   Eyes:  Negative for blurred vision.   Respiratory:  Negative for cough, hemoptysis, sputum production, shortness of breath and wheezing.    Cardiovascular:  Negative for chest pain, palpitations, orthopnea and leg swelling.   Gastrointestinal:  Positive for abdominal pain (Epigastric), nausea and vomiting (Nonbloody). Negative for heartburn.   Genitourinary:  Negative for flank pain, frequency and urgency.   Musculoskeletal:  Positive for neck pain (/stffness onset Tuesday, with improvement this morning). Negative for joint pain and myalgias.   Skin:  Positive for rash (Bilateral arms, legs and to her back, nonpruritic). Negative for itching.   Neurological:  Negative for dizziness, tingling, seizures, weakness and headaches.   Psychiatric/Behavioral:  Negative for depression.           Physical Exam   Vital Signs: Temp: 98.8  F (37.1  C) Temp src: Oral BP: 96/58 Pulse: 101   Resp: 20 SpO2: 100 % O2 Device: None  (Room air)    Weight: 118 lbs 11.2 oz    Physical Exam  Constitutional:       General: She is not in acute distress.     Appearance: She is ill-appearing. She is not toxic-appearing or diaphoretic.   HENT:      Head: Normocephalic and atraumatic.      Mouth/Throat:      Mouth: Mucous membranes are dry.   Eyes:      Extraocular Movements: Extraocular movements intact.      Pupils: Pupils are equal, round, and reactive to light.   Cardiovascular:      Rate and Rhythm: Regular rhythm. Tachycardia present.      Heart sounds: No murmur heard.     No friction rub. No gallop.   Pulmonary:      Effort: No respiratory distress.      Breath sounds: No wheezing or rales.   Abdominal:      General: Bowel sounds are normal.      Palpations: Abdomen is soft.      Tenderness: There is abdominal tenderness in the epigastric area. There is no guarding or rebound.      Comments: Nonrigid   Musculoskeletal:      Cervical back: Full passive range of motion without pain and normal range of motion. No spinous process tenderness or muscular tenderness.   Skin:     Capillary Refill: Capillary refill takes less than 2 seconds.      Findings: Rash is not crusting, nodular, pustular, scaling or vesicular.      Comments: Blanchable papular exanthem to the bilateral forearms and knees.  Also scattered throughout her back. non Pruritic     Neurological:      General: No focal deficit present.      Mental Status: She is alert and oriented to person, place, and time.      Cranial Nerves: Cranial nerves 2-12 are intact.      Sensory: Sensation is intact.      Motor: Motor function is intact.      Comments: Negative Kernig's and Brudzinski's sign   Psychiatric:         Mood and Affect: Mood normal.         Behavior: Behavior normal.           Medical Decision Making       75 MINUTES SPENT BY ME on the date of service doing chart review, history, exam, documentation & further activities per the note.      Data     I have personally reviewed the  following data over the past 24 hrs:    2.4 (L)  \   13.7   / 62 (L)     133 (L) 97 (L) 6.9 /  115 (H)   3.6 23 0.80 \     ALT: 272 (H) AST: 438 (H) AP: 325 (H) TBILI: 0.9   ALB: 4.1 TOT PROTEIN: 7.6 LIPASE: 23     Procal: 0.29 CRP: 159.40 (H) Lactic Acid: 1.9       INR:  1.32 (H) PTT:  N/A   D-dimer:  N/A Fibrinogen:  N/A     Ferritin:  N/A % Retic:  N/A LDH:  684 (H)       Imaging results reviewed over the past 24 hrs:   Recent Results (from the past 24 hour(s))   US Abdomen Limited    Narrative    EXAM: US ABDOMEN LIMITED  LOCATION: [facility]  DATE: 10/12/2024    INDICATION: Upper abdominal pain. Abnormal liver enzymes.  COMPARISON: None.  TECHNIQUE: Limited abdominal ultrasound.    FINDINGS:    GALLBLADDER: Gallbladder is decompressed. No gallstones, wall thickening, or pericholecystic fluid. Negative sonographic Renee's sign.    BILE DUCTS: No biliary dilatation. The common duct measures 3 mm.    LIVER: Normal parenchyma with smooth contour. No focal mass.    RIGHT KIDNEY: No hydronephrosis.    PANCREAS: The visualized portions are normal.    No ascites.      Impression    IMPRESSION:  1.  Normal limited abdominal ultrasound.

## 2024-10-12 NOTE — ED PROVIDER NOTES
Emergency Department Encounter     Evaluation Date & Time:   No admission date for patient encounter.    CHIEF COMPLAINT:  Fever, Chills, neck stiffness, Nausea & Vomiting, and Rash      Triage Note:Pt reports onset of fever and chills since Tuesday that has progressed to neck stiffness on Wednesday. She started having nausea and vomiting last night and a new rash on arms and legs that she noticed this morning.   Pt took acetaminophen/aspirin/caffeine medicine and her fever has come down. Afebrile in triage.   Denies HA.      Triage Assessment (Adult)       Row Name 10/12/24 1444          Triage Assessment    Airway WDL WDL        Respiratory WDL    Respiratory WDL WDL        Skin Circulation/Temperature WDL    Skin Circulation/Temperature WDL X;temperature     Skin Temperature warm        Cardiac WDL    Cardiac WDL X;rhythm     Pulse Rate & Regularity tachycardic        Peripheral/Neurovascular WDL    Peripheral Neurovascular WDL WDL        Cognitive/Neuro/Behavioral WDL    Cognitive/Neuro/Behavioral WDL WDL                     Impression and Plan       FINAL IMPRESSION:    ICD-10-CM    1. Febrile illness  R50.9       2. Elevated liver enzymes  R74.8       3. Thrombocytopenia (H)  D69.6       4. Leukopenia, unspecified type  D72.819             ED COURSE & MEDICAL DECISION MAKING:  3:24 PM I met with the patient, obtained history, performed an initial exam, and discussed options and plan for diagnostics and treatment here in the ED.      34 year old female, history of H.pylori and anemia, who presents for evaluation of subjective fevers with chills x 4 days, neck stiffness x 3 days and nausea with one episode of vomiting this morning. She noticed a rash to her arms and legs this morning; not pruritic or painful.     She denies headache and I do not suspect meningitis or encephalitis. Risks of LP felt to outweigh benefit.     On exam, she has papular rash to her back (see photo below), to BL forearms and BL knees.  Abdomen is soft with mild tenderness to palpation of the epigastrium; no peritoneal signs.     Patient placed on cardiac monitor, IV access established and blood sent for labs.  Given tachycardia, judicious IV fluids initiated.    Labs remarkable for leukopenia (WBC 2.4) without associated neutropenia (ANC 2.0).  She also has acute thrombocytopenia with platelet count of 62.    Her CRP is elevated (159.4) with unremarkable procalcitonin (0.29) and upper limits of normal lactate (1.9).    Hepatic panel returned remarkable for elevated alk phos (330), enzymes (, ) and direct bilirubin (0.98).    She has been taking an OTC medication that contains 250 mg Tylenol. Reports taking only 1 tab Q5 hours. Acetaminophen level returned at 6.3 - I do not suspect acetaminophen overdose as etiology of abnormal liver panel.    INR mildly elevated (1.32).     No recent travel or potential tick exposure, however given transaminitis, leukopenia, thrombocytopenia and rash, consider ehrlichiosis and babesiosis. Tick panel is pending.     Acute hepatitis panel also pending.     RUQ ultrasound performed and was normal.     COVID, influenza, RSV and strep tests negative.  No cough to suggest pneumonia and I do not think CXR is indicated.    UA negative for infection. UPT negative.     Decision made to admit patient for further evaluation and management.  Patient stable throughout ED course.        At the conclusion of the encounter I discussed the results of all the tests and the disposition. The questions were answered. The patient and family acknowledged understanding and were agreeable with the care plan.      Medical Decision Making    History:  Obtained supplemental history:Supplemental history obtained?: No  Reviewed external records: External records reviewed?: No    External consultation:  Did you consider but not order tests?: Work up considered but not performed and documented in chart, if applicable  Did you  interpret images independently?: Independent interpretation of ECG and images noted in documentation, when applicable.  Consultation discussion with other provider:Did you involve another provider (consultant, , pharmacy, etc.)?: I discussed the care with another health care provider, see documentation for details.    Complicating factors:  Care impacted by chronic illness:Documented in Chart  Care significantly affected by social determinants of health:N/A    Disposition considerations: Admit.      MEDICATIONS GIVEN IN THE EMERGENCY DEPARTMENT:  Medications   lidocaine 1 % 0.1-1 mL (has no administration in time range)   lidocaine (LMX4) cream (has no administration in time range)   sodium chloride (PF) 0.9% PF flush 3 mL (3 mLs Intracatheter $Given 10/12/24 5418)   sodium chloride (PF) 0.9% PF flush 3 mL (has no administration in time range)   melatonin tablet 5 mg (has no administration in time range)   senna-docusate (SENOKOT-S/PERICOLACE) 8.6-50 MG per tablet 1 tablet (has no administration in time range)     Or   senna-docusate (SENOKOT-S/PERICOLACE) 8.6-50 MG per tablet 2 tablet (has no administration in time range)   polyethylene glycol (MIRALAX) Packet 17 g (has no administration in time range)   ondansetron (ZOFRAN ODT) ODT tab 4 mg (has no administration in time range)     Or   ondansetron (ZOFRAN) injection 4 mg (has no administration in time range)   prochlorperazine (COMPAZINE) injection 10 mg (has no administration in time range)     Or   prochlorperazine (COMPAZINE) tablet 10 mg (has no administration in time range)     Or   prochlorperazine (COMPAZINE) suppository 25 mg (has no administration in time range)   calcium carbonate (TUMS) chewable tablet 1,000 mg (has no administration in time range)   sodium chloride 0.9 % infusion (has no administration in time range)   sodium phosphate 15 mmol in sodium chloride 0.9 % 250 mL intermittent infusion (has no administration in time range)   sodium  "chloride 0.9% BOLUS 500 mL (0 mLs Intravenous Stopped 10/12/24 1644)   sodium chloride 0.9% BOLUS 1,000 mL (1,000 mLs Intravenous $Started 10/12/24 1823)       NEW PRESCRIPTIONS STARTED AT TODAY'S ED VISIT:  New Prescriptions    No medications on file       HPI     The history is provided by the patient. No  was used.        Dianne Ochoa is a 34 year old female, history of H.pylori and anemia, who presents to this ED by walk-in for evaluation of fever, neck stiffness, vomiting and rash.     The patient developed subjective fever with chills 4 days ago. She then started having neck stiffness 3 days ago, but denies associated headache or other joint / muscle pains.     She had onset of nausea last night with 1 episode of vomiting this morning. She reports associated epigastric abdominal pain that she describes as \"nausea\". Denies diarrhea.    This morning she noticed a rash to her upper and lower extremities bilaterally. Rash is not itchy or painful.     She denies cough or rhinorrhea but notes a mild sore throat. No shortness of breath.     She has been taking OTC headache medication for her fevers with 250 mg Tylenol; also contains aspirin and caffeine. She has been taking 1 tab every 5 hours.     No recent travel or potential tick exposure.     S/p hernia repair. No other surgical history. Denies any chronic medical problems and does not take daily medications.     REVIEW OF SYSTEMS:  All other systems reviewed and are negative.      Medical History     Past Medical History:   Diagnosis Date    Anemia     Anemia in pregnancy 7/31/2022    Antepartum hemorrhage     Group B Streptococcus carrier, delivered, current hospitalization 01/17/2015    H. pylori infection 03/21/2017    Placenta Previa 2015    Thrombocytopenia (H)     Urogenital trichomoniasis 2017       Past Surgical History:   Procedure Laterality Date    HERNIA REPAIR         Family History   Problem Relation Age of Onset    No Known " Problems Mother     No Known Problems Father     No Known Problems Sister     No Known Problems Sister     No Known Problems Sister     No Known Problems Sister     No Known Problems Sister     No Known Problems Brother     No Known Problems Brother     No Known Problems Brother     No Known Problems Brother     No Known Problems Brother     No Known Problems Brother        Social History     Tobacco Use    Smoking status: Never     Passive exposure: Never    Smokeless tobacco: Never   Vaping Use    Vaping status: Never Used   Substance Use Topics    Alcohol use: No    Drug use: No       aspirin-acetaminophen-caffeine (EXCEDRIN MIGRAINE) 250-250-65 MG tablet  hydrocortisone, Perianal, (HYDROCORTISONE) 2.5 % cream  Prenatal MV-Min-Fe Fum-FA-DHA (PRENATAL MULTIVITAMIN PLUS DHA) 27-0.8-250 MG CAPS        Physical Exam     First Vitals:  Patient Vitals for the past 24 hrs:   BP Temp Temp src Pulse Resp SpO2 Weight   10/12/24 1930 96/57 -- -- 106 -- 100 % --   10/12/24 1900 97/57 -- -- 106 -- 100 % --   10/12/24 1845 95/58 -- -- 102 -- 100 % --   10/12/24 1830 96/58 -- -- 101 -- 100 % --   10/12/24 1825 98/59 -- -- 104 -- 100 % --   10/12/24 1800 (!) 85/53 -- -- 104 -- 100 % --   10/12/24 1730 (!) 86/55 -- -- 105 -- 100 % --   10/12/24 1715 94/56 -- -- 102 -- 100 % --   10/12/24 1700 101/59 -- -- 102 -- 100 % --   10/12/24 1645 96/56 -- -- 98 -- 100 % --   10/12/24 1630 91/57 -- -- 102 -- 100 % --   10/12/24 1615 (!) 87/55 -- -- 102 -- 100 % --   10/12/24 1600 (!) 86/57 -- -- 105 -- 99 % --   10/12/24 1545 90/55 -- -- 106 -- 99 % --   10/12/24 1530 96/55 -- -- 114 -- 100 % --   10/12/24 1525 93/62 -- -- 111 20 100 % --   10/12/24 1443 102/62 98.8  F (37.1  C) Oral 116 20 100 % 53.8 kg (118 lb 11.2 oz)       PHYSICAL EXAM:   Physical Exam    GENERAL: Awake, alert.  In no acute distress.   HEENT: Normocephalic, atraumatic.  Posterior oropharynx with mild erythema; no associated swelling or exudates.   NECK: No  stridor.  PULMONARY: Symmetrical breath sounds without distress.  Lungs clear to auscultation bilaterally without wheezes, rhonchi or rales.  CARDIO: Tachycardic rate with regular rhythm.  No significant murmur, rub or gallop.    ABDOMINAL: Abdomen soft, non-distended with mild tenderness to palpation epigastrium; no rebound tenderness or guarding.   EXTREMITIES: No lower extremity swelling or edema.      NEURO: Alert and oriented to person, place and time.  Cranial nerves grossly intact.  No focal motor deficit.  PSYCH: Normal mood and affect.  SKIN: Papular rash on back, BL forearms and BL knees.             Results     LAB:  All pertinent labs reviewed and interpreted  Labs Ordered and Resulted from Time of ED Arrival to Time of ED Departure   BASIC METABOLIC PANEL - Abnormal       Result Value    Sodium 133 (*)     Potassium 3.6      Chloride 97 (*)     Carbon Dioxide (CO2) 23      Anion Gap 13      Urea Nitrogen 6.9      Creatinine 0.80      GFR Estimate >90      Calcium 8.5 (*)     Glucose 115 (*)    HEPATIC FUNCTION PANEL - Abnormal    Protein Total 7.6      Albumin 4.1      Bilirubin Total 0.9      Alkaline Phosphatase 325 (*)      (*)      (*)     Bilirubin Direct 0.63 (*)    ROUTINE UA WITH MICROSCOPIC REFLEX TO CULTURE - Abnormal    Color Urine Yellow      Appearance Urine Clear      Glucose Urine Negative      Bilirubin Urine Negative      Ketones Urine Negative      Specific Gravity Urine 1.018      Blood Urine 0.06 mg/dL (*)     pH Urine 6.5      Protein Albumin Urine 50 (*)     Urobilinogen Urine 2.0 (*)     Nitrite Urine Negative      Leukocyte Esterase Urine Negative      Bacteria Urine Few (*)     Budding Yeast Urine Few (*)     RBC Urine 3 (*)     WBC Urine 5      Squamous Epithelials Urine 2 (*)    CRP INFLAMMATION - Abnormal    CRP Inflammation 159.40 (*)    CBC WITH PLATELETS AND DIFFERENTIAL - Abnormal    WBC Count 2.4 (*)     RBC Count 4.89      Hemoglobin 13.7      Hematocrit  42.1      MCV 86      MCH 28.0      MCHC 32.5      RDW 12.2      Platelet Count 62 (*)     % Neutrophils 83      % Lymphocytes 13      % Monocytes 3      % Eosinophils 0      % Basophils 0      % Immature Granulocytes 0      NRBCs per 100 WBC 0      Absolute Neutrophils 2.0      Absolute Lymphocytes 0.3 (*)     Absolute Monocytes 0.1      Absolute Eosinophils 0.0      Absolute Basophils 0.0      Absolute Immature Granulocytes 0.0      Absolute NRBCs 0.0     ACETAMINOPHEN LEVEL - Abnormal    Acetaminophen 6.3 (*)    INR - Abnormal    INR 1.32 (*)    PHOSPHORUS - Abnormal    Phosphorus 1.8 (*)    LACTATE DEHYDROGENASE - Abnormal    Lactate Dehydrogenase 684 (*)    HEPATIC FUNCTION PANEL - Abnormal    Protein Total 6.2 (*)     Albumin 3.4 (*)     Bilirubin Total 1.1      Alkaline Phosphatase 330 (*)      (*)      (*)     Bilirubin Direct 0.98 (*)    LACTIC ACID WHOLE BLOOD - Normal    Lactic Acid 1.9     LIPASE - Normal    Lipase 23     HCG QUALITATIVE URINE - Normal    hCG Urine Qualitative Negative     PROCALCITONIN - Normal    Procalcitonin 0.29     INFLUENZA A/B, RSV, & SARS-COV2 PCR - Normal    Influenza A PCR Negative      Influenza B PCR Negative      RSV PCR Negative      SARS CoV2 PCR Negative     MAGNESIUM - Normal    Magnesium 2.1     URINE DRUG SCREEN PANEL - Normal    Amphetamines Urine Screen Negative      Barbituates Urine Screen Negative      Benzodiazepine Urine Screen Negative      Cannabinoids Urine Screen Negative      Cocaine Urine Screen Negative      Fentanyl Qual Urine Screen Negative      Opiates Urine Screen Negative      PCP Urine Screen Negative     GROUP A STREPTOCOCCUS PCR THROAT SWAB - Normal    Group A strep by PCR Not Detected     TICK-BORNE DISEASE PANEL BY PCR   ACUTE HEPATITIS PANEL   ACETAMINOPHEN LEVEL   SALICYLATE LEVEL   RESPIRATORY PANEL PCR   BLOOD CULTURE         RADIOLOGY:  US Abdomen Limited   Final Result   IMPRESSION:   1.  Normal limited abdominal  ultrasound.                  I, Nevaeh Jiménez, am serving as a scribe to document services personally performed by Mary Gruber MD based on my observation and the provider's statements to me. I, Mary Gruber MD attest that Nevaeh Jiménez is acting in a scribe capacity, has observed my performance of the services and has documented them in accordance with my direction.    Mary Gruber MD  Emergency Medicine  [facility] EMERGENCY DEPARTMENT           Mary Gruber MD  10/12/24 1949

## 2024-10-12 NOTE — ED NOTES
[facility] ED Handoff Report    ED Chief Complaint: fever,chills, and neck stiffness    ED Diagnosis:  (R50.9) Febrile illness  Comment: afebrile in triage  Plan: continue to monitor.     (R74.8) Elevated liver enzymes  Comment: need ultrasound  Plan: continue to monitor    (D69.6) Thrombocytopenia (H)  Comment: plts 62  Plan: continue to monitor.     (D72.819) Leukopenia, unspecified type  Plan: continue to monitor.       PMH:    Past Medical History:   Diagnosis Date    Anemia     Anemia in pregnancy 7/31/2022    Antepartum hemorrhage     Created by Conversion     Group B Streptococcus carrier, delivered, current hospitalization 01/17/2015    H. pylori infection 03/21/2017    Placenta Previa 2015    Thrombocytopenia (H)     Urogenital trichomoniasis 2017        Code Status:  Full Code     Falls Risk: No Band: Not applicable    Current Living Situation/Residence: lives with a significant other     Elimination Status: Continent: Yes     Activity Level: Independent    Patients Preferred Language:  English     Needed: No    Vital Signs:  BP 98/59   Pulse 104   Temp 98.8  F (37.1  C) (Oral)   Resp 20   Wt 53.8 kg (118 lb 11.2 oz)   LMP 09/20/2024 (Approximate)   SpO2 100%   Breastfeeding No   BMI 21.54 kg/m       Last eat/drink:  today 2pm    Pain Score: 0/10    Is the Patient Confused:  No    Tests Performed: Done: Labs and Imaging    Treatments Provided:  see mar    Family Dynamics/Concerns: No    Family Updated On Visitor Policy: Yes    Plan of Care Communicated to Family: Yes    Who Was Updated about Plan of Care:     Belongings Checklist Done and Signed by Patient: Yes    Medications sent with patient: n/a    Covid: asymptomatic , negative    Additional Information: hypotensive in the 80's right now, receiving IVF. Neg for UTI.    RN: Jillian Thompson RN 10/12/2024 6:33 PM

## 2024-10-13 ENCOUNTER — APPOINTMENT (OUTPATIENT)
Dept: ULTRASOUND IMAGING | Facility: HOSPITAL | Age: 34
End: 2024-10-13
Attending: INTERNAL MEDICINE
Payer: COMMERCIAL

## 2024-10-13 LAB
A PHAGOCYTOPH DNA BLD QL NAA+PROBE: NOT DETECTED
ALBUMIN SERPL BCG-MCNC: 2.9 G/DL (ref 3.5–5.2)
ALP SERPL-CCNC: 355 U/L (ref 40–150)
ALT SERPL W P-5'-P-CCNC: 250 U/L (ref 0–50)
ANION GAP SERPL CALCULATED.3IONS-SCNC: 9 MMOL/L (ref 7–15)
AST SERPL W P-5'-P-CCNC: 303 U/L (ref 0–45)
BABESIA DNA BLD QL NAA+PROBE: NOT DETECTED
BASOPHILS # BLD AUTO: 0 10E3/UL (ref 0–0.2)
BASOPHILS NFR BLD AUTO: 1 %
BILIRUB SERPL-MCNC: 1.3 MG/DL
BUN SERPL-MCNC: 3.2 MG/DL (ref 6–20)
C PNEUM DNA SPEC QL NAA+PROBE: NOT DETECTED
CALCIUM SERPL-MCNC: 6.9 MG/DL (ref 8.8–10.4)
CHLORIDE SERPL-SCNC: 107 MMOL/L (ref 98–107)
CREAT SERPL-MCNC: 0.67 MG/DL (ref 0.51–0.95)
CRP SERPL-MCNC: 105.3 MG/L
EGFRCR SERPLBLD CKD-EPI 2021: >90 ML/MIN/1.73M2
EHRLICHIA DNA SPEC QL NAA+PROBE: NOT DETECTED
EOSINOPHIL # BLD AUTO: 0 10E3/UL (ref 0–0.7)
EOSINOPHIL NFR BLD AUTO: 1 %
ERYTHROCYTE [DISTWIDTH] IN BLOOD BY AUTOMATED COUNT: 12.5 % (ref 10–15)
FERRITIN SERPL-MCNC: 655 NG/ML (ref 6–175)
FLUAV H1 2009 PAND RNA SPEC QL NAA+PROBE: NOT DETECTED
FLUAV H1 RNA SPEC QL NAA+PROBE: NOT DETECTED
FLUAV H3 RNA SPEC QL NAA+PROBE: NOT DETECTED
FLUAV RNA SPEC QL NAA+PROBE: NOT DETECTED
FLUBV RNA SPEC QL NAA+PROBE: NOT DETECTED
GLUCOSE SERPL-MCNC: 86 MG/DL (ref 70–99)
HADV DNA SPEC QL NAA+PROBE: NOT DETECTED
HCO3 SERPL-SCNC: 19 MMOL/L (ref 22–29)
HCOV PNL SPEC NAA+PROBE: NOT DETECTED
HCT VFR BLD AUTO: 34 % (ref 35–47)
HGB BLD-MCNC: 11.2 G/DL (ref 11.7–15.7)
HIV 1+2 AB+HIV1 P24 AG SERPL QL IA: NONREACTIVE
HMPV RNA SPEC QL NAA+PROBE: NOT DETECTED
HPIV1 RNA SPEC QL NAA+PROBE: NOT DETECTED
HPIV2 RNA SPEC QL NAA+PROBE: NOT DETECTED
HPIV3 RNA SPEC QL NAA+PROBE: NOT DETECTED
HPIV4 RNA SPEC QL NAA+PROBE: NOT DETECTED
IMM GRANULOCYTES # BLD: 0 10E3/UL
IMM GRANULOCYTES NFR BLD: 1 %
INR PPP: 1.35 (ref 0.85–1.15)
IRON BINDING CAPACITY (ROCHE): 188 UG/DL (ref 240–430)
IRON SATN MFR SERPL: 11 % (ref 15–46)
IRON SERPL-MCNC: 21 UG/DL (ref 37–145)
LYMPHOCYTES # BLD AUTO: 0.5 10E3/UL (ref 0.8–5.3)
LYMPHOCYTES NFR BLD AUTO: 25 %
M PNEUMO DNA SPEC QL NAA+PROBE: NOT DETECTED
MAGNESIUM SERPL-MCNC: 1.6 MG/DL (ref 1.7–2.3)
MCH RBC QN AUTO: 28.6 PG (ref 26.5–33)
MCHC RBC AUTO-ENTMCNC: 32.9 G/DL (ref 31.5–36.5)
MCV RBC AUTO: 87 FL (ref 78–100)
MONOCYTES # BLD AUTO: 0.2 10E3/UL (ref 0–1.3)
MONOCYTES NFR BLD AUTO: 7 %
MONOCYTES NFR BLD AUTO: NEGATIVE %
NEUTROPHILS # BLD AUTO: 1.4 10E3/UL (ref 1.6–8.3)
NEUTROPHILS NFR BLD AUTO: 65 %
NRBC # BLD AUTO: 0 10E3/UL
NRBC BLD AUTO-RTO: 0 /100
PHOSPHATE SERPL-MCNC: 1.9 MG/DL (ref 2.5–4.5)
PHOSPHATE SERPL-MCNC: 2.1 MG/DL (ref 2.5–4.5)
PLAT MORPH BLD: NORMAL
PLATELET # BLD AUTO: 60 10E3/UL (ref 150–450)
POTASSIUM SERPL-SCNC: 3.4 MMOL/L (ref 3.4–5.3)
PROT SERPL-MCNC: 5.4 G/DL (ref 6.4–8.3)
RBC # BLD AUTO: 3.92 10E6/UL (ref 3.8–5.2)
RBC MORPH BLD: NORMAL
RETICS # AUTO: 0.01 10E6/UL (ref 0.03–0.1)
RETICS/RBC NFR AUTO: 0.4 % (ref 0.5–2)
RSV RNA SPEC QL NAA+PROBE: NOT DETECTED
RSV RNA SPEC QL NAA+PROBE: NOT DETECTED
RV+EV RNA SPEC QL NAA+PROBE: DETECTED
SODIUM SERPL-SCNC: 135 MMOL/L (ref 135–145)
WBC # BLD AUTO: 2.2 10E3/UL (ref 4–11)

## 2024-10-13 PROCEDURE — 258N000003 HC RX IP 258 OP 636: Performed by: STUDENT IN AN ORGANIZED HEALTH CARE EDUCATION/TRAINING PROGRAM

## 2024-10-13 PROCEDURE — 250N000009 HC RX 250

## 2024-10-13 PROCEDURE — 87389 HIV-1 AG W/HIV-1&-2 AB AG IA: CPT | Performed by: STUDENT IN AN ORGANIZED HEALTH CARE EDUCATION/TRAINING PROGRAM

## 2024-10-13 PROCEDURE — 87529 HSV DNA AMP PROBE: CPT | Performed by: INTERNAL MEDICINE

## 2024-10-13 PROCEDURE — 80053 COMPREHEN METABOLIC PANEL: CPT

## 2024-10-13 PROCEDURE — 84100 ASSAY OF PHOSPHORUS: CPT | Performed by: STUDENT IN AN ORGANIZED HEALTH CARE EDUCATION/TRAINING PROGRAM

## 2024-10-13 PROCEDURE — 83735 ASSAY OF MAGNESIUM: CPT

## 2024-10-13 PROCEDURE — 36415 COLL VENOUS BLD VENIPUNCTURE: CPT

## 2024-10-13 PROCEDURE — 85007 BL SMEAR W/DIFF WBC COUNT: CPT | Performed by: STUDENT IN AN ORGANIZED HEALTH CARE EDUCATION/TRAINING PROGRAM

## 2024-10-13 PROCEDURE — 85610 PROTHROMBIN TIME: CPT

## 2024-10-13 PROCEDURE — 85004 AUTOMATED DIFF WBC COUNT: CPT

## 2024-10-13 PROCEDURE — 250N000011 HC RX IP 250 OP 636

## 2024-10-13 PROCEDURE — 84100 ASSAY OF PHOSPHORUS: CPT | Performed by: FAMILY MEDICINE

## 2024-10-13 PROCEDURE — 82728 ASSAY OF FERRITIN: CPT | Performed by: INTERNAL MEDICINE

## 2024-10-13 PROCEDURE — 85014 HEMATOCRIT: CPT | Performed by: STUDENT IN AN ORGANIZED HEALTH CARE EDUCATION/TRAINING PROGRAM

## 2024-10-13 PROCEDURE — 86140 C-REACTIVE PROTEIN: CPT

## 2024-10-13 PROCEDURE — 76705 ECHO EXAM OF ABDOMEN: CPT

## 2024-10-13 PROCEDURE — 86140 C-REACTIVE PROTEIN: CPT | Performed by: HOSPITALIST

## 2024-10-13 PROCEDURE — 258N000003 HC RX IP 258 OP 636

## 2024-10-13 PROCEDURE — 250N000009 HC RX 250: Performed by: STUDENT IN AN ORGANIZED HEALTH CARE EDUCATION/TRAINING PROGRAM

## 2024-10-13 PROCEDURE — 99254 IP/OBS CNSLTJ NEW/EST MOD 60: CPT | Performed by: STUDENT IN AN ORGANIZED HEALTH CARE EDUCATION/TRAINING PROGRAM

## 2024-10-13 PROCEDURE — 83550 IRON BINDING TEST: CPT | Performed by: INTERNAL MEDICINE

## 2024-10-13 PROCEDURE — 120N000001 HC R&B MED SURG/OB

## 2024-10-13 PROCEDURE — 36415 COLL VENOUS BLD VENIPUNCTURE: CPT | Performed by: INTERNAL MEDICINE

## 2024-10-13 PROCEDURE — 85045 AUTOMATED RETICULOCYTE COUNT: CPT | Performed by: STUDENT IN AN ORGANIZED HEALTH CARE EDUCATION/TRAINING PROGRAM

## 2024-10-13 PROCEDURE — 36415 COLL VENOUS BLD VENIPUNCTURE: CPT | Performed by: STUDENT IN AN ORGANIZED HEALTH CARE EDUCATION/TRAINING PROGRAM

## 2024-10-13 PROCEDURE — 250N000013 HC RX MED GY IP 250 OP 250 PS 637: Performed by: STUDENT IN AN ORGANIZED HEALTH CARE EDUCATION/TRAINING PROGRAM

## 2024-10-13 PROCEDURE — 82390 ASSAY OF CERULOPLASMIN: CPT | Performed by: INTERNAL MEDICINE

## 2024-10-13 PROCEDURE — 86308 HETEROPHILE ANTIBODY SCREEN: CPT | Performed by: INTERNAL MEDICINE

## 2024-10-13 PROCEDURE — 99233 SBSQ HOSP IP/OBS HIGH 50: CPT | Performed by: STUDENT IN AN ORGANIZED HEALTH CARE EDUCATION/TRAINING PROGRAM

## 2024-10-13 RX ORDER — IBUPROFEN 400 MG/1
400 TABLET, FILM COATED ORAL 3 TIMES DAILY PRN
Status: DISCONTINUED | OUTPATIENT
Start: 2024-10-13 | End: 2024-10-14 | Stop reason: HOSPADM

## 2024-10-13 RX ADMIN — SODIUM CHLORIDE: 9 INJECTION, SOLUTION INTRAVENOUS at 14:29

## 2024-10-13 RX ADMIN — SODIUM CHLORIDE: 9 INJECTION, SOLUTION INTRAVENOUS at 05:33

## 2024-10-13 RX ADMIN — POTASSIUM & SODIUM PHOSPHATES POWDER PACK 280-160-250 MG 1 PACKET: 280-160-250 PACK at 23:37

## 2024-10-13 RX ADMIN — SODIUM PHOSPHATE, MONOBASIC, MONOHYDRATE AND SODIUM PHOSPHATE, DIBASIC, ANHYDROUS 15 MMOL: 142; 276 INJECTION, SOLUTION INTRAVENOUS at 09:45

## 2024-10-13 RX ADMIN — DOXYCYCLINE 100 MG: 100 INJECTION, POWDER, LYOPHILIZED, FOR SOLUTION INTRAVENOUS at 19:34

## 2024-10-13 RX ADMIN — DOXYCYCLINE 100 MG: 100 INJECTION, POWDER, LYOPHILIZED, FOR SOLUTION INTRAVENOUS at 08:17

## 2024-10-13 RX ADMIN — PANTOPRAZOLE SODIUM 40 MG: 40 INJECTION, POWDER, FOR SOLUTION INTRAVENOUS at 08:14

## 2024-10-13 RX ADMIN — POTASSIUM & SODIUM PHOSPHATES POWDER PACK 280-160-250 MG 1 PACKET: 280-160-250 PACK at 19:32

## 2024-10-13 ASSESSMENT — ACTIVITIES OF DAILY LIVING (ADL)
ADLS_ACUITY_SCORE: 18

## 2024-10-13 NOTE — PLAN OF CARE
Problem: Pain Acute  Goal: Optimal Pain Control and Function  Outcome: Progressing  Intervention: Prevent or Manage Pain  Problem: Electrolyte Imbalance  Goal: Electrolyte Balance  Outcome: Progressing  Problem: Adult Inpatient Plan of Care  Goal: Optimal Comfort and Wellbeing  Outcome: Progressing    Patient denies pain. VSS. Fever improved, last temperature was 99 degrees. Patient has rash across body. Rash on upper lip, back, upper arms, and knees/shins. On IVF. Monitoring electrolytes.    Patient educated on plan of care. Answered all questions and concerns. Verbalized understanding, plan of care ongoing.

## 2024-10-13 NOTE — PLAN OF CARE
"  Problem: Adult Inpatient Plan of Care  Goal: Plan of Care Review  Description: The Plan of Care Review/Shift note should be completed every shift.  The Outcome Evaluation is a brief statement about your assessment that the patient is improving, declining, or no change.  This information will be displayed automatically on your shift  note.  Outcome: Progressing     Problem: Adult Inpatient Plan of Care  Goal: Patient-Specific Goal (Individualized)  Description: You can add care plan individualizations to a care plan. Examples of Individualization might be:  \"Parent requests to be called daily at 9am for status\", \"I have a hard time hearing out of my right ear\", or \"Do not touch me to wake me up as it startles  me\".  Outcome: Progressing     Problem: Adult Inpatient Plan of Care  Goal: Optimal Comfort and Wellbeing  Outcome: Progressing     Patient alert and oriented. Soft BP's, tachycardia and mild fever. APAP given for this. Bolus NS ran. IV fluids running at this time. Phos protocol in place. Tele on. Denies any pain when asked, states she is \"starting to feel better\". Rash noted to BLE, back and mouth area. Cooperative with cares and staff.   "

## 2024-10-13 NOTE — PLAN OF CARE
"  Problem: Adult Inpatient Plan of Care  Goal: Plan of Care Review  Description: The Plan of Care Review/Shift note should be completed every shift.  The Outcome Evaluation is a brief statement about your assessment that the patient is improving, declining, or no change.  This information will be displayed automatically on your shift  note.  Outcome: Progressing     Problem: Adult Inpatient Plan of Care  Goal: Patient-Specific Goal (Individualized)  Description: You can add care plan individualizations to a care plan. Examples of Individualization might be:  \"Parent requests to be called daily at 9am for status\", \"I have a hard time hearing out of my right ear\", or \"Do not touch me to wake me up as it startles  me\".  Outcome: Progressing     Problem: Adult Inpatient Plan of Care  Goal: Absence of Hospital-Acquired Illness or Injury  Outcome: Progressing     Problem: Electrolyte Imbalance  Goal: Electrolyte Balance  Outcome: Progressing   Goal Outcome Evaluation:  Patient alert oriented x 4, discomfort in ABD, ABD US completed, temp 100.1, offered tylenol to patient and she declined, spouse at bedside, tolerating regular diet, telemetry NSR                      "

## 2024-10-13 NOTE — CONSULTS
CM consult ordered. SW has reviewed the chart and no CM needs or discharge planning needs identified at this time. No further care management intervention anticipated at this time.  Please re-consult if further needs arise.  Care management signing off.      HENRIQUE Bond on 10/13/2024 at 8:22 AM

## 2024-10-13 NOTE — PROVIDER NOTIFICATION
Updated Dr. Villagran regarding temp of 102.5, soft BP and elevated HR. She states to continue IVF and doxycycline. No further orders at this time. Charge aware.      Via [employer] at [id number].

## 2024-10-13 NOTE — PLAN OF CARE
"Alert and oriented. Independent in the room. Reports getting up to the bathroom this morning and feeling much better. Rash is vastly improved. Traces of rash on legs, knees, and face around nose. Back appears normal free of rash. Temp of 100.1, refused tylenol saying it makes her nauseous. I suggested taking it with food and she said she had tried that at home with tylenol but it still upset her stomach. Held tylenol prn for now and contacted provider for alternative fever reducer. IV phosphate ordered for replacement from RN protocol. \"Patient may be lactating according to lab results\" popped up in chart and provider notified verbally.  Stated no evidence of pregnancy. Had a neck ache and refused ibuprofen as well concerned it would upset her stomach. Heat and ice given to pt. For sore neck which she says accompanied her fevers at home    "

## 2024-10-13 NOTE — CONSULTS
[facility] CONSULTATION    Dianne Ochoa   [address]  [place] [address]  34 year old female  Admission Date/Time: 10/12/2024  3:06 PM    Primary Care Provider:  Madelin Smith    Requesting Physician: Gino Argueta MD      CHIEF COMPLAINT:   Abnormal LFTs    REASON FOR THE CONSULT: Abnormal LFTs    HPI:   Dianne Ochoa is a 34-year-old female with no significant past medical history who presented to [facility] on 10/12 with fevers, chills, nausea and upper abdominal pain.    Patient states her symptoms started on Tuesday night with fevers, chills and neck stiffness.  She then developed upper abdominal pain with association of nausea, nonbloody, nonbilious emesis and decreased oral intake.  Since then, she has used an over-the-counter medication every 5 hours that consists of acetaminophen, ASA and caffeine.  She does not take any routine prescription medications or any over-the-counter supplements or medications.  No chest pain, shortness of breath, dysphagia, constipation or diarrhea, joint pains, jaundice.  She does report having developed a rash yesterday in her bilateral arms, legs that is not painful and not itchy.  She does report using a new body wash and shampoo.  No recent travel, sick contacts.  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  No alcohol, tobacco or recreational drug use.    In the ED, she was afebrile but mildly tachycardic.  Labs were notable for , , alk phos 325, total bilirubin 0.6, .  Her platelets are 62 and WBC 2.4.  Lactate, lipase and procalcitonin were within normal.  Rapid strep test negative, tickborne disease panel pending.  Her Tylenol level was normal.    REVIEW OF SYSTEMS: 13 point review of systems is negative except that noted above.    PAST MEDICAL HISTORY:   Past Medical History:   Diagnosis Date    Anemia     Anemia in pregnancy 7/31/2022    Antepartum hemorrhage     Created by Conversion     Group B Streptococcus  carrier, delivered, current hospitalization 01/17/2015    H. pylori infection 03/21/2017    Placenta Previa 2015    Thrombocytopenia (H)     Urogenital trichomoniasis 2017       PAST SURGICAL HISTORY:   Past Surgical History:   Procedure Laterality Date    HERNIA REPAIR         FAMILY HISTORY:   Family History   Problem Relation Age of Onset    No Known Problems Mother     No Known Problems Father     No Known Problems Sister     No Known Problems Sister     No Known Problems Sister     No Known Problems Sister     No Known Problems Sister     No Known Problems Brother     No Known Problems Brother     No Known Problems Brother     No Known Problems Brother     No Known Problems Brother     No Known Problems Brother        SOCIAL HISTORY:   Social History     Tobacco Use    Smoking status: Never     Passive exposure: Never    Smokeless tobacco: Never   Substance Use Topics    Alcohol use: No        MEDS:  Medications Prior to Admission   Medication Sig Dispense Refill Last Dose    aspirin-acetaminophen-caffeine (EXCEDRIN MIGRAINE) 250-250-65 MG tablet Take 1 tablet by mouth 4 times daily as needed for headaches.   10/12/2024 at am    hydrocortisone, Perianal, (HYDROCORTISONE) 2.5 % cream Place rectally 2 times daily as needed for hemorrhoids. 30 g 0     Prenatal MV-Min-Fe Fum-FA-DHA (PRENATAL MULTIVITAMIN PLUS DHA) 27-0.8-250 MG CAPS Take 1 tablet by mouth daily. OK to substitute formulary equivalent 100 capsule 3 Past Week at am       ALLERGIES/SENSITIVITIES: No known drug allergy    MEDICATIONS:  No current outpatient medications on file.       PHYSICAL EXAM:  Temp:  [98.8  F (37.1  C)-102.5  F (39.2  C)] 100.1  F (37.8  C)  Pulse:  [] 92  Resp:  [18-20] 20  BP: ()/(50-62) 95/56  SpO2:  [96 %-100 %] 96 %  Body mass index is 21.54 kg/m .  GEN: Well developed, well nourished 34 year old in no acute distress.  HEENT: sclera anicteric, moist mucous membranes.   LYMPH: No cervical lymphadenopathy  PULM:  Nonlabored breathing. Breath sounds equal.   CARDIO: Regular rate  GI: Non-distended. Soft.  Non-tender to palpation.  No guarding. No rebound tenderness.  EXT: warm, no lower extremity edema, has rash on bilateral forearms her knees and on her back  NEURO: Alert. No focal defects.   PSYCH: Mental status appropriate, mood and affect normal.    SKIN: No rashes  MSK: No joint abnormalities    LABORATORY DATA:  CBC RESULTS:   Recent Labs   Lab Test 10/13/24  0710   WBC 2.2*   RBC 3.92   HGB 11.2*   HCT 34.0*   MCV 87   MCH 28.6   MCHC 32.9   RDW 12.5   PLT 60*        CMP Results:   Recent Labs   Lab Test 10/13/24  0710      POTASSIUM 3.4   CHLORIDE 107   CO2 19*   ANIONGAP 9   GLC 86   BUN 3.2*   CR 0.67   BILITOTAL 1.3*   ALKPHOS 355*   *   *        INR Results:   Recent Labs   Lab Test 10/13/24  0710   INR 1.35*          RELEVANT IMAGING:    EXAM: US ABDOMEN LIMITED  LOCATION: [facility]  DATE: 10/12/2024     INDICATION: Upper abdominal pain. Abnormal liver enzymes.  COMPARISON: None.  TECHNIQUE: Limited abdominal ultrasound.     FINDINGS:     GALLBLADDER: Gallbladder is decompressed. No gallstones, wall thickening, or pericholecystic fluid. Negative sonographic Renee's sign.     BILE DUCTS: No biliary dilatation. The common duct measures 3 mm.     LIVER: Normal parenchyma with smooth contour. No focal mass.     RIGHT KIDNEY: No hydronephrosis.     PANCREAS: The visualized portions are normal.     No ascites.                                                                      IMPRESSION:  1.  Normal limited abdominal ultrasound.      ASSESSMENT:   Dianne Ochoa is a 34-year-old female with no significant past medical history who presented to [facility] on 10/12 with fevers, chills, rash, nausea and upper abdominal pain.  She was found to have abnormal LFTs and pancytopenia.  There is a concern for possible infectious etiology of her symptoms such as viral or  tickborne disease.  She has no other inciting triggers besides taking over-the-counter acetaminophen and ASA meds to help with symptoms.  Her normal LFTs could be related to this underlying process or could have been triggered solely by the medications that she was taking or another separate process.  Acute hepatitis panel was negative.    PLAN:  Okay for regular diet    Obtain DENEEN, ASMA, A1 AT, AMA, ceruloplasmin, iron studies and ferritin, HIV, HSV, EBV and CMV.    Appreciate ID input on further infectious disease workup    Obtain limited abdominal ultrasound with Dopplers to evaluate liver vasculature      68 minutes of total time was spent providing patient care, including patient evaluation, reviewing documentation/test results and .                                                Malini Benson M.D.  Thank you for the opportunity to participate in the care of this patient.   Please feel free to call me with any questions or concerns.  Phone number [phone].              CC: [facility], [place], [place]

## 2024-10-13 NOTE — CONSULTS
Consultation - INFECTIOUS DISEASE CONSULTATION  Dianne Ochoa,  1990, MRN [id number]      Thrombocytopenia (H) [D69.6]  Elevated liver enzymes [R74.8]  Febrile illness [R50.9]  Leukopenia, unspecified type [D72.819]    PCP: Madelin Smith, [phone]   Code status:  Full Code               Chief Complaint: Febrile illness with abnormal liver function test and cytopenia     Assessment:  Dianne Ochoa is a 34 year old old female with no significant past medical history who is admitted with fever, leukopenia, thrombocytopenia, and abnormal liver function test associated with some sore throat and fatigue.  No recent exposure to tick although she went camping in 2024.  Respiratory panel positive for rhino/enterovirus.  Unclear clinical significance of this given really scant upper respiratory symptoms.  Tickborne panel in process.  On doxycycline.  Fever looks a little better.  Liver function also appears better.  In a young woman with no clear tickborne illness, will obtain peripheral smear to rule out leukemia.    Recommendations:   Continue doxycycline for now.  Follow-up pending tickborne illness.  Monitor liver function test and cell count.  Peripheral smear ordered.  Low threshold to start on IV vancomycin and cefepime.  Monitor neck.  Low threshold to obtain lumbar puncture    Discussed with the patient, family, nursing staff.    Thank you for letting us be part of the patient care team. We will follow.    Doris Vance MD,MD  [facility].   [employer] [facility]  Office Telephone [phone].  Fax [phone]  Formerly [facility] paging    HPI:    Dianne Ochoa is a 34 year old old female. History is provided by the patient, chart review.  ID is asked to see this patient for febrile leukopenia with thrombocytopenia and abnormal liver function test.  The patient was doing well up until 6 days ago when she started to have some headache associated with fever, diffuse body  ache.  Symptoms worsened over time.  Has also been having some neck stiffness and recent sore throat.  Admitted with the symptoms, patient was found to have a lab abnormality as described above.  Has been started on doxycycline.  Tickborne illness panel in process.  No recent tick bite.  Went camping in July 2024.      ===========================================    Medical History  Past Medical History:   Diagnosis Date    Anemia     Anemia in pregnancy 7/31/2022    Antepartum hemorrhage     Created by Conversion     Group B Streptococcus carrier, delivered, current hospitalization 01/17/2015    H. pylori infection 03/21/2017    Placenta Previa 2015    Thrombocytopenia (H)     Urogenital trichomoniasis 2017        Surgical History  Past Surgical History:   Procedure Laterality Date    HERNIA REPAIR              Social History  Reviewed, and she  reports that she has never smoked. She has never been exposed to tobacco smoke. She has never used smokeless tobacco. She reports that she does not drink alcohol and does not use drugs.        Family History  Family History   Problem Relation Age of Onset    No Known Problems Mother     No Known Problems Father     No Known Problems Sister     No Known Problems Sister     No Known Problems Sister     No Known Problems Sister     No Known Problems Sister     No Known Problems Brother     No Known Problems Brother     No Known Problems Brother     No Known Problems Brother     No Known Problems Brother     No Known Problems Brother       Psychosocial Needs  Social History     Social History Narrative    Not on file     Additional psychosocial needs reviewed per nursing assessment.       Allergies   Allergen Reactions    No Known Drug Allergy         Medications Prior to Admission   Medication Sig Dispense Refill Last Dose    aspirin-acetaminophen-caffeine (EXCEDRIN MIGRAINE) 250-250-65 MG tablet Take 1 tablet by mouth 4 times daily as needed for headaches.   10/12/2024 at am     hydrocortisone, Perianal, (HYDROCORTISONE) 2.5 % cream Place rectally 2 times daily as needed for hemorrhoids. 30 g 0     Prenatal MV-Min-Fe Fum-FA-DHA (PRENATAL MULTIVITAMIN PLUS DHA) 27-0.8-250 MG CAPS Take 1 tablet by mouth daily. OK to substitute formulary equivalent 100 capsule 3 Past Week at am        Review of Systems:  Negative except for findings in the HPI Physical Exam:  Temp:  [98.8  F (37.1  C)-102.5  F (39.2  C)] 100.1  F (37.8  C)  Pulse:  [] 92  Resp:  [18-20] 20  BP: ()/(50-62) 95/56  SpO2:  [96 %-100 %] 96 %      Gen: Pleasant in no acute distress.  HEENT: NCAT. EOMI. PERRL.  Neck: Decreased range of motion in the neck but no clear nuchal rigidity  Lungs: Clear to ascultation bilat with no crackles or wheezes.  Card: RRR. NSR. No RMG. Peripheral pulses present and symmetric. No edema.  Abd: Soft NT ND. No mass. Normal bowel sounds.  Skin: No rash.  Extr: No edema.  Neuro: Alert and oriented to place time and person. Cranial nerves II to XII intact.         ============================    Pertinent Labs  personally reviewed.   Recent Labs   Lab 10/13/24  0710 10/12/24  1510   WBC 2.2* 2.4*   HGB 11.2* 13.7   HCT 34.0* 42.1   PLT 60* 62*       Recent Labs   Lab 10/13/24  0710 10/12/24  1900 10/12/24  1510     --  133*   CO2 19*  --  23   BUN 3.2*  --  6.9   ALBUMIN 2.9* 3.4* 4.1   ALKPHOS 355* 330* 325*   * 268* 272*   * 430* 438*       MICROBIOLOGY DATA:  Personally reviewed   Contains abnormal data Respiratory Panel PCR  Order: [id number]  Status: Final result       Visible to patient: Yes (not seen)    Specimen Information: Nasopharyngeal; Swab   0 Result Notes          Component  Ref Range & Units 1 d ago  (10/12/24) 1 d ago  (10/12/24)    Adenovirus  Not Detected Not Detected     Coronavirus  Not Detected Not Detected    Comment: This test detects Coronavirus 229E, HKU1, NL63 and OC43 but does not distinguish between them. It does not detect MERS (  Respiratory Syndrome), SARS (Severe Acute Respiratory Syndrome) or 2019-nCoV (Novel 2019) Coronavirus.    Human Metapneumovirus  Not Detected Not Detected     Human Rhin/Enterovirus  Not Detected Detected Abnormal      Influenza A  Not Detected Not Detected Negative R    Influenza A, H1  Not Detected Not Detected     Influenza A 2009 H1N1  Not Detected Not Detected     Influenza A, H3  Not Detected Not Detected     Influenza B  Not Detected Not Detected Negative R    Parainfluenza Virus 1  Not Detected Not Detected     Parainfluenza Virus 2  Not Detected Not Detected     Parainfluenza Virus 3  Not Detected Not Detected     Parainfluenza Virus 4  Not Detected Not Detected     Respiratory Syncytial Virus A  Not Detected Not Detected     Respiratory Syncytial Virus B  Not Detected Not Detected     Chlamydia Pneumoniae  Not Detected Not Detected     Mycoplasma Pneumoniae  Not Detected Not Detected    Resulting Agency [facility]              Narrative  Performed by: [facility]  The [facility] Respiratory Panel is a qualitative nucleic acid, multiplex, in vitro diagnostic test for the simultaneous detection and identification of multiple respiratory viral and bacterial nucleic acids in nasopharyngeal swabs collected in viral transport media from individual exhibiting signs and symptoms of respiratory infection. The assay has received FDA approval for the testing of nasopharyngeal (NP) swabs only. This test is used for clinical purposes and should not be regarded as investigational or for research. This laboratory is certified under the Clinical Laboratory Improvement Amendments of 1988 (CLIA-88) as qualified to perform high complexity clinical laboratory testing.      Specimen Collected: 10/12/24  3:14 PM Last Resulted: 10/13/24  1:03 AM          Pertinent Radiology  personally reviewed.

## 2024-10-13 NOTE — PROGRESS NOTES
[facility]    Medicine Progress Note - Hospitalist Service    Date of Admission:  10/12/2024    Assessment & Plan   Dianne Ochoa is a 34 year old female admitted on 10/12/2024. She has no significant past medical history other than external hemorrhoids presented to the ER for evaluation of fever, chills, nausea, epigastric abdominal pain, neck stiffness and a rash to bilateral upper and lower extremities and her back.     #Severe sepsis, unclear source, POA  #Fevers  #Elevated LFTs  #Epigastric abdominal pain  #Diffuse macular rash on back and extremities, improving  Presented with several days of abdominal pain, fever/chills, malaise, nausea, and generalized rash. No upper respiratory symptoms. Found to be febrile here to 102.5F, tachycardic. SBP 80-90s. Leukopenic to 2.2, platelets 60. Renal function normal. AST/ALT ~430/250, , Tbili 1.3. RVP positive for rhinovirus. Diffuse blanchable macular rash including extremities and back on exam. RUQ US normal. Denies alcohol use and heavy Tylenol or NSAID use. Unclear cause of symptoms and liver injury, infection of some kind seems most likely. Less likely is autoimmune disease, vasculitis though these remain possible. Unlikely cholangitis/choledocholithiasis with normal CBD size and collapsed gallbladder on US. Urine HCG negative, ruling out HELLP.  - GI and ID consulted  - Repeat RUQ US but with doppler to rule out clot  - HIV negative  - DENEEN pending  - Tick borne illness panel pending  - Per GI: HSV 1/2 PCR, CMV, EBV, ASMA, AMA, ceruloplasmin, A1AT, iron studies pending; mono screen negative  - Continue doxycycline 100mg IV BID pending ID recs  - Patient report some neck stiffness, defer to ID if LP or MRI warranted  - Follow blood cultures    #Rhinovirus URI  Patient denies sore throat, congestion rhinorrhea. Rhinovirus positive though on RVP.  - Supportive care    #Thrombocytopenia  #Leukopenia  Likely reactive to above inflammatory  process. Hgb stable. Lower concern for MAHA or acutely consumptive process. Normal MCV/stable Hgb make severe B12 deficiency unlikely.  - Work up as above    #Hypophosphatemia  - Replacement protocol    #External hemorrhoids            Diet: Combination Diet Regular Diet Adult    DVT Prophylaxis: Pneumatic Compression Devices  Waldron Catheter: Not present  Lines: None     Cardiac Monitoring: ACTIVE order. Indication: Tachyarrhythmias, acute (48 hours)  Code Status: Full Code      Clinically Significant Risk Factors Present on Admission         # Hyponatremia: Lowest Na = 133 mmol/L in last 2 days, will monitor as appropriate  # Hypochloremia: Lowest Cl = 97 mmol/L in last 2 days, will monitor as appropriate    # Hypomagnesemia: Lowest Mg = 1.6 mg/dL in last 2 days, will replace as needed   # Hypoalbuminemia: Lowest albumin = 2.9 g/dL at 10/13/2024  7:10 AM, will monitor as appropriate  # Coagulation Defect: INR = 1.35 (Ref range: 0.85 - 1.15) and/or PTT = N/A, will monitor for bleeding  # Thrombocytopenia: Lowest platelets = 60 in last 2 days, will monitor for bleeding                      Disposition Plan     Medically Ready for Discharge: Anticipated in 2-4 Days             EFFIE CARTWRIGHT MD  Hospitalist Service  [facility]  Securely message with [employer] (more info)  Text page via [email or web address] Paging/Directory   ______________________________________________________________________    Interval History   The patient is feeling better this morning, less malaise and feels she can walk without feeling so dizzy. Rash is also improving.    Physical Exam   Vital Signs: Temp: 100.1  F (37.8  C) Temp src: Oral BP: 95/56 Pulse: 92   Resp: 20 SpO2: 96 % O2 Device: None (Room air)    Weight: 118 lbs 11.2 oz    General: No overt distress, conversational, non-toxic appearing  HEENT: MMM. Neck reported stiff but can flex neck without signs of discomfort  Pulmonary: CTAB; no crackles, wheezing, or rhonchi,  normal effort  Cardiac: RRR. No m/r/g  Abdomen: Soft. ND. Mild diffuse TTP but no rebound or guarding  Extremities: No bilateral LE edema  Skin: Blanchable macular rash of bilateral arms and back, reportedly improved  Neuro: alert and awake, Ox4      Medical Decision Making       60 MINUTES SPENT BY ME on the date of service doing chart review, history, exam, documentation & further activities per the note.      Data     I have personally reviewed the following data over the past 24 hrs:    2.2 (L)  \   11.2 (L)   / 60 (L)     135 107 3.2 (L) /  86   3.4 19 (L) 0.67 \     ALT: 250 (H) AST: 303 (H) AP: 355 (H) TBILI: 1.3 (H)   ALB: 2.9 (L) TOT PROTEIN: 5.4 (L) LIPASE: 23     Procal: 0.29 CRP: 105.30 (H) Lactic Acid: 1.9       INR:  1.35 (H) PTT:  N/A   D-dimer:  N/A Fibrinogen:  N/A     Ferritin:  N/A % Retic:  N/A LDH:  684 (H)       Imaging results reviewed over the past 24 hrs:   Recent Results (from the past 24 hour(s))   US Abdomen Limited    Narrative    EXAM: US ABDOMEN LIMITED  LOCATION: [facility]  DATE: 10/12/2024    INDICATION: Upper abdominal pain. Abnormal liver enzymes.  COMPARISON: None.  TECHNIQUE: Limited abdominal ultrasound.    FINDINGS:    GALLBLADDER: Gallbladder is decompressed. No gallstones, wall thickening, or pericholecystic fluid. Negative sonographic Renee's sign.    BILE DUCTS: No biliary dilatation. The common duct measures 3 mm.    LIVER: Normal parenchyma with smooth contour. No focal mass.    RIGHT KIDNEY: No hydronephrosis.    PANCREAS: The visualized portions are normal.    No ascites.      Impression    IMPRESSION:  1.  Normal limited abdominal ultrasound.

## 2024-10-14 ENCOUNTER — TELEPHONE (OUTPATIENT)
Dept: FAMILY MEDICINE | Facility: CLINIC | Age: 34
End: 2024-10-14
Payer: COMMERCIAL

## 2024-10-14 VITALS
RESPIRATION RATE: 20 BRPM | SYSTOLIC BLOOD PRESSURE: 111 MMHG | OXYGEN SATURATION: 99 % | WEIGHT: 118.7 LBS | DIASTOLIC BLOOD PRESSURE: 67 MMHG | BODY MASS INDEX: 21.54 KG/M2 | TEMPERATURE: 99.2 F | HEART RATE: 106 BPM

## 2024-10-14 LAB
ALBUMIN SERPL BCG-MCNC: 3.5 G/DL (ref 3.5–5.2)
ALP SERPL-CCNC: 432 U/L (ref 40–150)
ALT SERPL W P-5'-P-CCNC: 257 U/L (ref 0–50)
ANA SER QL IF: NEGATIVE
ANION GAP SERPL CALCULATED.3IONS-SCNC: 13 MMOL/L (ref 7–15)
AST SERPL W P-5'-P-CCNC: 232 U/L (ref 0–45)
BASOPHILS # BLD MANUAL: 0 10E3/UL (ref 0–0.2)
BASOPHILS NFR BLD MANUAL: 0 %
BILIRUB SERPL-MCNC: 0.9 MG/DL
BUN SERPL-MCNC: 4.8 MG/DL (ref 6–20)
CALCIUM SERPL-MCNC: 7.9 MG/DL (ref 8.8–10.4)
CERULOPLASMIN SERPL-MCNC: 43 MG/DL (ref 20–60)
CHLORIDE SERPL-SCNC: 104 MMOL/L (ref 98–107)
CMV IGM SERPL IA-ACNC: <8 AU/ML
CMV IGM SERPL IA-ACNC: NEGATIVE
CREAT SERPL-MCNC: 0.68 MG/DL (ref 0.51–0.95)
CRP SERPL-MCNC: 87.9 MG/L
EGFRCR SERPLBLD CKD-EPI 2021: >90 ML/MIN/1.73M2
EOSINOPHIL # BLD MANUAL: 0 10E3/UL (ref 0–0.7)
EOSINOPHIL NFR BLD MANUAL: 1 %
ERYTHROCYTE [DISTWIDTH] IN BLOOD BY AUTOMATED COUNT: 12.7 % (ref 10–15)
ERYTHROCYTE [DISTWIDTH] IN BLOOD BY AUTOMATED COUNT: 12.7 % (ref 10–15)
GLUCOSE SERPL-MCNC: 89 MG/DL (ref 70–99)
HCO3 SERPL-SCNC: 20 MMOL/L (ref 22–29)
HCT VFR BLD AUTO: 35.2 % (ref 35–47)
HCT VFR BLD AUTO: 35.4 % (ref 35–47)
HGB BLD-MCNC: 11.2 G/DL (ref 11.7–15.7)
HGB BLD-MCNC: 11.5 G/DL (ref 11.7–15.7)
HOLD SPECIMEN: NORMAL
HSV1 DNA SPEC QL NAA+PROBE: NEGATIVE
HSV2 DNA SPEC QL NAA+PROBE: NEGATIVE
INR PPP: 1.06 (ref 0.85–1.15)
LYMPHOCYTES # BLD MANUAL: 0.4 10E3/UL (ref 0.8–5.3)
LYMPHOCYTES NFR BLD MANUAL: 20 %
MCH RBC QN AUTO: 27.6 PG (ref 26.5–33)
MCH RBC QN AUTO: 27.9 PG (ref 26.5–33)
MCHC RBC AUTO-ENTMCNC: 31.6 G/DL (ref 31.5–36.5)
MCHC RBC AUTO-ENTMCNC: 32.7 G/DL (ref 31.5–36.5)
MCV RBC AUTO: 85 FL (ref 78–100)
MCV RBC AUTO: 87 FL (ref 78–100)
MONOCYTES # BLD MANUAL: 0.1 10E3/UL (ref 0–1.3)
MONOCYTES NFR BLD MANUAL: 3 %
NEUTROPHILS # BLD MANUAL: 1.6 10E3/UL (ref 1.6–8.3)
NEUTROPHILS NFR BLD MANUAL: 73 %
OTHER CELLS # BLD MANUAL: 0.1 10E3/UL
OTHER CELLS NFR BLD MANUAL: 3 %
PATH REPORT.COMMENTS IMP SPEC: NORMAL
PATH REPORT.COMMENTS IMP SPEC: NORMAL
PATH REPORT.FINAL DX SPEC: NORMAL
PATH REPORT.MICROSCOPIC SPEC OTHER STN: NORMAL
PATH REPORT.RELEVANT HX SPEC: NORMAL
PATH REV: ABNORMAL
PHOSPHATE SERPL-MCNC: 2.6 MG/DL (ref 2.5–4.5)
PLAT MORPH BLD: ABNORMAL
PLATELET # BLD AUTO: 68 10E3/UL (ref 150–450)
PLATELET # BLD AUTO: 72 10E3/UL (ref 150–450)
POTASSIUM SERPL-SCNC: 3.4 MMOL/L (ref 3.4–5.3)
PROT SERPL-MCNC: 6.2 G/DL (ref 6.4–8.3)
RBC # BLD AUTO: 4.06 10E6/UL (ref 3.8–5.2)
RBC # BLD AUTO: 4.12 10E6/UL (ref 3.8–5.2)
RBC MORPH BLD: ABNORMAL
SODIUM SERPL-SCNC: 137 MMOL/L (ref 135–145)
VARIANT LYMPHS BLD QL SMEAR: PRESENT
WBC # BLD AUTO: 2.2 10E3/UL (ref 4–11)
WBC # BLD AUTO: 3.4 10E3/UL (ref 4–11)

## 2024-10-14 PROCEDURE — 80053 COMPREHEN METABOLIC PANEL: CPT | Performed by: STUDENT IN AN ORGANIZED HEALTH CARE EDUCATION/TRAINING PROGRAM

## 2024-10-14 PROCEDURE — 86747 PARVOVIRUS ANTIBODY: CPT | Performed by: INTERNAL MEDICINE

## 2024-10-14 PROCEDURE — 99239 HOSP IP/OBS DSCHRG MGMT >30: CPT | Performed by: HOSPITALIST

## 2024-10-14 PROCEDURE — 82103 ALPHA-1-ANTITRYPSIN TOTAL: CPT | Performed by: INTERNAL MEDICINE

## 2024-10-14 PROCEDURE — 85018 HEMOGLOBIN: CPT | Performed by: STUDENT IN AN ORGANIZED HEALTH CARE EDUCATION/TRAINING PROGRAM

## 2024-10-14 PROCEDURE — 86720 LEPTOSPIRA ANTIBODY: CPT | Performed by: HOSPITALIST

## 2024-10-14 PROCEDURE — 250N000009 HC RX 250

## 2024-10-14 PROCEDURE — 250N000011 HC RX IP 250 OP 636

## 2024-10-14 PROCEDURE — 84100 ASSAY OF PHOSPHORUS: CPT | Performed by: STUDENT IN AN ORGANIZED HEALTH CARE EDUCATION/TRAINING PROGRAM

## 2024-10-14 PROCEDURE — 85610 PROTHROMBIN TIME: CPT | Performed by: PHYSICIAN ASSISTANT

## 2024-10-14 PROCEDURE — 36415 COLL VENOUS BLD VENIPUNCTURE: CPT | Performed by: STUDENT IN AN ORGANIZED HEALTH CARE EDUCATION/TRAINING PROGRAM

## 2024-10-14 PROCEDURE — 85060 BLOOD SMEAR INTERPRETATION: CPT | Performed by: PATHOLOGY

## 2024-10-14 PROCEDURE — 99232 SBSQ HOSP IP/OBS MODERATE 35: CPT | Performed by: INTERNAL MEDICINE

## 2024-10-14 PROCEDURE — 250N000013 HC RX MED GY IP 250 OP 250 PS 637: Performed by: STUDENT IN AN ORGANIZED HEALTH CARE EDUCATION/TRAINING PROGRAM

## 2024-10-14 PROCEDURE — 36415 COLL VENOUS BLD VENIPUNCTURE: CPT | Performed by: PHYSICIAN ASSISTANT

## 2024-10-14 PROCEDURE — 36415 COLL VENOUS BLD VENIPUNCTURE: CPT | Performed by: INTERNAL MEDICINE

## 2024-10-14 RX ORDER — DOXYCYCLINE 100 MG/1
100 CAPSULE ORAL 2 TIMES DAILY
Status: DISCONTINUED | OUTPATIENT
Start: 2024-10-14 | End: 2024-10-14 | Stop reason: HOSPADM

## 2024-10-14 RX ORDER — ACETAMINOPHEN 325 MG/1
325 TABLET ORAL EVERY 4 HOURS PRN
Status: SHIPPED
Start: 2024-10-14

## 2024-10-14 RX ORDER — PNV NO.95/FERROUS FUM/FOLIC AC 28MG-0.8MG
1 TABLET ORAL DAILY
Status: SHIPPED
Start: 2024-10-22

## 2024-10-14 RX ORDER — DOXYCYCLINE 100 MG/1
100 CAPSULE ORAL 2 TIMES DAILY
Qty: 14 CAPSULE | Refills: 0 | Status: SHIPPED | OUTPATIENT
Start: 2024-10-14 | End: 2024-10-24

## 2024-10-14 RX ADMIN — POTASSIUM & SODIUM PHOSPHATES POWDER PACK 280-160-250 MG 1 PACKET: 280-160-250 PACK at 03:35

## 2024-10-14 RX ADMIN — PANTOPRAZOLE SODIUM 40 MG: 40 INJECTION, POWDER, FOR SOLUTION INTRAVENOUS at 08:28

## 2024-10-14 RX ADMIN — DOXYCYCLINE 100 MG: 100 INJECTION, POWDER, LYOPHILIZED, FOR SOLUTION INTRAVENOUS at 08:28

## 2024-10-14 ASSESSMENT — ACTIVITIES OF DAILY LIVING (ADL)
ADLS_ACUITY_SCORE: 18

## 2024-10-14 NOTE — DISCHARGE SUMMARY
[facility] MEDICINE  DISCHARGE SUMMARY     Primary Care Physician: Madelin Smith  Admission Date: 10/12/2024   Discharge Provider: Joseluis Smith MD Discharge Date: 10/14/2024   Diet:   Active Diet and Nourishment Order   Procedures    Combination Diet Regular Diet Adult       Code Status: Full Code   Activity: DCACTIVITY: Activity as tolerated        Condition at Discharge: Stable     REASON FOR PRESENTATION(See Admission Note for Details)     Fevers, HA, rash    PRINCIPAL & ACTIVE DISCHARGE DIAGNOSES     Febrile illness  Acute hepatitis  Pancytopenia      PENDING LABS     Unresulted Labs Ordered in the Past 30 Days of this Admission       Date and Time Order Name Status Description    10/14/2024 10:46 AM Parvovirus B19 antibodies IgG IgM In process     10/14/2024  7:31 AM Leptospira IgM In process     10/14/2024 12:01 AM Alpha 1 antitryp ana reflex to pheno In process     10/13/2024  9:15 AM Mitochondrial M2 Antibody IgG In process     10/13/2024  9:15 AM F Actin EIA with reflex In process     10/12/2024  6:31 PM Blood Culture Peripheral Blood Preliminary               PROCEDURES ( this hospitalization only)      Work-up thus far:  -RUQ US with doppler normal.    -Tick borne illness panel, HSV, mono, HIV, acute hepatitis panel, CMV, DENEEN, ceruloplasmin negative      -PERIPHERAL BLOOD MORPHOLOGY  1. MODERATE THROMBOCYTOPENIA  2. MODERATE ABSOLUTE LYMPHOPENIA WITH OCCASIONAL PLASMA CELLS  3. MODERATE LEUKOPENIA, WITHOUT ABSOLUTE NEUTROPENIA. MILD NEUTROPHILIC LEFT SHIFT  CONSISTENT WITH AN ACUTE PHASE INFLAMMATORY REACTION  4. BORDERLINE NORMOCHROMIC-NORMOCYTIC ANEMIA  A. NEGATIVE FOR INCREASED ANISOPOIKILOCYTOSIS AND ERYTHROCYTE FRAGMENTATION  B. LACK OF CORRESPONDING RETICULOCYTOSIS  Electronically signed by Lisa Toro MD on 10/14/2024 at 4:46 PM  .  Comment  Thrombocytopenia may be secondary to decreased bone marrow production of platelets (such as in direct marrow  toxicity due  to liver disease), or increased peripheral destruction such as an hypersplenism or DIC. Erythrocyte fragmentation is not  identified.  An absolute lymphopenia can be associated with overwhelming bacterial or viral infections, immunosuppressive viral infections,  immunosuppressive drugs and aging. Occasional plasma cells are present, consistent with an immune response.  There is a borderline normochromic-normocytic anemia, with nonspecific erythrocyte morphology. Iron studies are suggestive  of anemia of chronic disease. Clinical correlation and close follow-up are recommended.  Clinical Information  Thrombocytopenia (H)  Peripheral Smear  Erythrocytes are normal in number, normochromic and normocytic with a mild anemia. Increased anisocytosis and increased  poikilocytosis are not identified. Erythrocyte fragmentation is not seen. Increased polychromasia, basophilic stippling and  nucleated red blood cells are not identified.  Leukocytes are moderately decreased in number, without an absolute neutropenia. There is a moderate absolute lymphopenia  (0.4 K/uL). There is a mild neutrophilic left shift composed of band forms, without toxic granulation. Megaloblastic changes,  dysplastic features and blasts are not identified. Lymphocytes are mature and polymorphic in appearance. Occasional plasma  cells are present.      RECOMMENDATIONS TO OUTPATIENT PROVIDER FOR F/U VISIT     Monitor CRP, CMP, CBC with differential to ensure normalization.  Follow-up on pending labs        DISPOSITION     Home    SUMMARY OF HOSPITAL COURSE:      34F without significant PMH who presents with several days of fever, chills, nausea, epigastric abdominal pain, neck stiffness and a rash to bilateral upper and lower extremities and her back.  Found to have elevated LFTs and pancytopenia.  Tested positive for rhinovirus/enterovirus.    Empirically started on doxycycline due to concern for possible Tick borne illness with  improvement in fever curve, inflammatory markers.  LFTs and cytopenia seem to be improving as well.    Ms. Ochoa has strong preference to discharge home today.  We discussed my preference to have Mrs. Ochoa remain another day to ensure LFTs and cytopenia are trending in the right direction, however, she really would like to get home to her kids and for better sleep.  I do no think it is unreasonable to discharge home today given improving fever curve & CRP, resolution of rash, improvement in neck pain, and what seems start in improvement in LFTs and cytopenias.  Close PCP already arranged for 10/17.  Mrs. Ochoa seems quite reliable and would return with recurrent fever or worsening symptoms.     #Febrile illness with severe sepsis  #Rhino/enterovirus +   #Acute hepatitis  #Diffuse macular rash on back and extremities, improving  #Pancytopenia  #Neck pain, query viral meningitis -   -Possibly all due to an enteroviral infection with exaggerated SIRS response which is self resolving vs. Bacterial infection being treated by doxycycline.  Either way seems to be improving at this time and will continue doxycycline for another 7 days.   -No headache, altered mental status, or focal neurologic symptom.  The neck pain is rapidly improving and I'm not sure an LP would be helpful at this time given clinical improvement.  -Close follow-up with PCP already arranged and will need to ensure continued clinical improvement and recovery of laboratory abnormalities.       Discharge Medications with Med changes:     Current Discharge Medication List        START taking these medications    Details   acetaminophen (TYLENOL) 325 MG tablet Take 1 tablet (325 mg) by mouth every 4 hours as needed for mild pain, fever or headaches. Do not exceed 1,000mg in a 24 hours period    Associated Diagnoses: Febrile illness      doxycycline monohydrate (MONODOX) 100 MG capsule Take 1 capsule (100 mg) by mouth 2 times daily.  Qty: 14 capsule, Refills: 0     Associated Diagnoses: Febrile illness           CONTINUE these medications which have CHANGED    Details   Prenatal MV-Min-Fe Fum-FA-DHA (PRENATAL MULTIVITAMIN PLUS DHA) 27-0.8-250 MG CAPS Take 1 tablet by mouth daily. OK to substitute formulary equivalent    Comments: Can restart this after you finish the doxycycline antibiotic  Associated Diagnoses: Lactating mother           CONTINUE these medications which have NOT CHANGED    Details   hydrocortisone, Perianal, (HYDROCORTISONE) 2.5 % cream Place rectally 2 times daily as needed for hemorrhoids.  Qty: 30 g, Refills: 0    Associated Diagnoses: External hemorrhoids           STOP taking these medications       aspirin-acetaminophen-caffeine (EXCEDRIN MIGRAINE) 250-250-65 MG tablet Comments:   Reason for Stopping:                     Rationale for medication changes:      Doxy febrile illness of unclear etiology        Consults     GASTROENTEROLOGY IP CONSULT  CARE MANAGEMENT / SOCIAL WORK IP CONSULT  INFECTIOUS DISEASES IP CONSULT    Immunizations given this encounter     Most Recent Immunizations   Administered Date(s) Administered    HPV9 11/13/2017    HepB, Unspecified 07/05/2005    Influenza (H1N1) 12/08/2009    Influenza Vaccine >6 months,quad, PF 10/03/2014    MMR 01/19/2005    Poliovirus, inactivated (IPV) 07/05/2005    Rubella Immunity: Titer/md Dx 01/25/2022    TDAP (Adacel,Boostrix) 06/02/2022    Td,adult,historic,unspecified 01/19/2005    Varicella 05/24/2004           Anticoagulation Information            SIGNIFICANT IMAGING FINDINGS     Results for orders placed or performed during the hospital encounter of 10/12/24   US Abdomen Limited    Impression    IMPRESSION:  1.  Normal limited abdominal ultrasound.       US Abdomen Limited w Doppler Complete    Impression    IMPRESSION:  Normal abdominal liver duplex.             Discharge Orders     No discharge procedures on file.    Examination   Physical Exam   Temp:  [98.7  F (37.1  C)-99.8  F (37.7   C)] 99.2  F (37.3  C)  Pulse:  [] 106  Resp:  [16-20] 20  BP: ()/(53-67) 111/67  SpO2:  [97 %-100 %] 99 %  Wt Readings from Last 1 Encounters:   10/12/24 53.8 kg (118 lb 11.2 oz)       Feeling better and very much would like to go home.    Rash resolved  No abdominal pain, HA, confusion.  Neck pain still present but 50% better.      Please see EMR for more detailed significant labs, imaging, consultant notes etc.    IJoseluis MD, personally saw the patient today and spent greater than 30 minutes discharging this patient.    Joseluis Smith MD  [facility]    CC:Madelin Smith

## 2024-10-14 NOTE — TELEPHONE ENCOUNTER
I spoke with Dr Smith.  Previously healthy patient seen in the emergency room for fever, headache and rash; hospitalized for fever, pancytopenia, markedly elevated LFTs.  Was seen by ID and GI during her hospitalization.  Feeling significantly better today and strongly preferred to go home.  Symptoms were attributed to potential tickborne illness versus other viral illness and she was started on doxycycline, no other antibiotics given.  Multiple labs still pending.  Patient really wanting to go home tonight and her team wanted to make sure that she would be able to get into clinic for follow-up.      Advised that I will have our  get her scheduled for this week.  If for some reason no appointment has been made or she has not heard from us by tomorrow, she should give us call.    Patient now scheduled in clinic for 3 days from now.    Future Appointments   Date Time Provider Department Center   10/17/2024 12:30 PM  (arrive 12:10) Axel Workman MD [facility]        I will also ask Clinic RN to call her tomorrow to check and make sure she is doing okay.

## 2024-10-14 NOTE — PLAN OF CARE
Problem: Infection  Goal: Absence of Infection Signs and Symptoms  Outcome: Progressing  Intervention: Prevent or Manage Infection  Recent Flowsheet Documentation  Taken 10/14/2024 0830 by Regina Blandon RN  Isolation Precautions: droplet precautions maintained   Goal Outcome Evaluation:       Pt reports mild ache in neck (3/10). Refused any intervention for pain. Pt on droplet precautions. Given IV doxycycline this AM, pt reported it to be painful, future doses switched to oral. Pt asked about how antibiotic may upset her GI. Educated to take with food and water to help reduce GI symptoms. A&O x4. Pt requested a bath, done independently.

## 2024-10-14 NOTE — TELEPHONE ENCOUNTER
W[facility] RN team:  Please contact patient sometime on  Tuesday 10/15/24 to make sure her symptoms are continuing to improve and she isn't having any problems obtaining or taking her antibiotics.  Also please make sure she knows about her Thursday clinic appt with Dr Workman.  I'm out of clinic the last half of the week and unable to get her in myself.

## 2024-10-14 NOTE — PLAN OF CARE
"Denies any pain this evening. No fever or rash noted. A/o x4, running tachy, otherwise VSS. Discharged with sister @ 1832.    Problem: Adult Inpatient Plan of Care  Goal: Plan of Care Review  Description: The Plan of Care Review/Shift note should be completed every shift.  The Outcome Evaluation is a brief statement about your assessment that the patient is improving, declining, or no change.  This information will be displayed automatically on your shift  note.  Outcome: Met  Goal: Patient-Specific Goal (Individualized)  Description: You can add care plan individualizations to a care plan. Examples of Individualization might be:  \"Parent requests to be called daily at 9am for status\", \"I have a hard time hearing out of my right ear\", or \"Do not touch me to wake me up as it startles  me\".  Outcome: Met  Goal: Absence of Hospital-Acquired Illness or Injury  Outcome: Met  Intervention: Identify and Manage Fall Risk  Recent Flowsheet Documentation  Taken 10/14/2024 1635 by Sandra Martinez RN  Safety Promotion/Fall Prevention:   assistive device/personal items within reach   clutter free environment maintained   lighting adjusted   nonskid shoes/slippers when out of bed   safety round/check completed   room near nurse's station   treat underlying cause  Intervention: Prevent Skin Injury  Recent Flowsheet Documentation  Taken 10/14/2024 1635 by Sandra Martinez RN  Body Position:   position changed independently   supine, head elevated  Intervention: Prevent Infection  Recent Flowsheet Documentation  Taken 10/14/2024 1635 by Sandra Martinez RN  Infection Prevention:   hand hygiene promoted   rest/sleep promoted  Goal: Optimal Comfort and Wellbeing  Outcome: Met  Goal: Readiness for Transition of Care  Outcome: Met     Problem: Pain Acute  Goal: Optimal Pain Control and Function  Outcome: Met  Intervention: Prevent or Manage Pain  Recent Flowsheet Documentation  Taken 10/14/2024 1635 by Sandra Martinez" RN  Medication Review/Management: medications reviewed     Problem: Nausea and Vomiting  Goal: Nausea and Vomiting Relief  Outcome: Met     Problem: Electrolyte Imbalance  Goal: Electrolyte Balance  Outcome: Met     Problem: Infection  Goal: Absence of Infection Signs and Symptoms  Outcome: Met  Intervention: Prevent or Manage Infection  Recent Flowsheet Documentation  Taken 10/14/2024 1635 by Sandra Martinez, RN  Isolation Precautions: droplet precautions maintained   Goal Outcome Evaluation:

## 2024-10-14 NOTE — PROGRESS NOTES
INFECTIOUS DISEASE FOLLOW UP NOTE    Date: 10/14/2024   CHIEF COMPLAINT:   Chief Complaint   Patient presents with    Fever    Chills    neck stiffness    Nausea & Vomiting    Rash        ASSESSMENT:    Dianne Ochoa is a 34 year old old female with no significant past medical history who is admitted with fever, leukopenia, thrombocytopenia, and abnormal liver function test associated with some sore throat and fatigue.  No recent exposure to tick although she went camping in 2024. EBV, HIV, CMV IgM negative.  Respiratory panel positive for rhino/enterovirus.  Unclear clinical significance of this given really scant upper respiratory symptoms.  Tickborne panel negative.  On doxycycline.  Fever looks a little better.  Liver function also appears better.  In a young woman with no clear tickborne illness, will obtain peripheral smear to rule out leukemia.    PLAN:  - pending: peripheral smear, lepto  - doxy PO x7 more days  - can check parvo  - discussed with patient, nursing staff, hospitalist    Nydia Elias MD  [facility]   Office Telephone [phone].  Fax [phone]  [employer] paging    ______________________________________________________________________    SUBJECTIVE / INTERVAL HISTORY:  First visit by me. Feels better. No fevers or chills. Has 5 kids at home. Energy improving as well    ROS: All other systems negative except as listed above.    SH/FH/Habits/PMH reviewed and unchanged.    OBJECTIVE:  BP 92/56 (BP Location: Right arm)   Pulse 90   Temp 99.7  F (37.6  C) (Oral)   Resp 20   Wt 53.8 kg (118 lb 11.2 oz)   LMP 2024 (Approximate)   SpO2 98%   Breastfeeding No   BMI 21.54 kg/m       Resp: 20    Vital Signs  Temp: 99.7  F (37.6  C)  Temp src: Oral  Resp: 20  Pulse: 90  Pulse Rate Source: Monitor  BP: 92/56  BP Location: Right arm    Temp (24hrs), Av.8  F (37.7  C), Min:99.5  F (37.5  C), Max:100.1  F (37.8  C)      GEN: No acute distress.    RESPIRATORY:   "Normal breathing pattern.    EXTREMITIES: No edema.  SKIN/HAIR/NAILS:  No rashes  IV:  peripheral IV        Antibiotics:  doxy    Pertinent labs:  No results found for: \"CRP\"   CBC RESULTS:   Recent Labs   Lab Test 10/14/24  0710   WBC 3.4*   RBC 4.12   HGB 11.5*   HCT 35.2   MCV 85   MCH 27.9   MCHC 32.7   RDW 12.7   PLT 72*      Last Comprehensive Metabolic Panel:  Sodium   Date Value Ref Range Status   10/14/2024 137 135 - 145 mmol/L Final     Potassium   Date Value Ref Range Status   10/14/2024 3.4 3.4 - 5.3 mmol/L Final     Chloride   Date Value Ref Range Status   10/14/2024 104 98 - 107 mmol/L Final     Carbon Dioxide (CO2)   Date Value Ref Range Status   10/14/2024 20 (L) 22 - 29 mmol/L Final     Anion Gap   Date Value Ref Range Status   10/14/2024 13 7 - 15 mmol/L Final     Glucose   Date Value Ref Range Status   10/14/2024 89 70 - 99 mg/dL Final     Urea Nitrogen   Date Value Ref Range Status   10/14/2024 4.8 (L) 6.0 - 20.0 mg/dL Final     Creatinine   Date Value Ref Range Status   10/14/2024 0.68 0.51 - 0.95 mg/dL Final     GFR Estimate   Date Value Ref Range Status   10/14/2024 >90 >60 mL/min/1.73m2 Final     Comment:     eGFR calculated using 2021 CKD-EPI equation.     Calcium   Date Value Ref Range Status   10/14/2024 7.9 (L) 8.8 - 10.4 mg/dL Final     Comment:     Reference intervals for this test were updated on 7/16/2024 to reflect our healthy population more accurately. There may be differences in the flagging of prior results with similar values performed with this method. Those prior results can be interpreted in the context of the updated reference intervals.        MICROBIOLOGY DATA:  Personally reviewed.  7-Day Micro Results       Collected Updated Procedure Result Status      10/13/2024 1114 10/13/2024 1120 Herpes Simplex Virus 1&2 by PCR [[id number]]   Blood from Arm, Right    In process Component Value   No component results            10/12/2024 1900 10/13/2024 2046 Blood Culture " Peripheral Blood [[id number]]    Peripheral Blood    Preliminary result Component Value   Culture No growth after 1 day  [P]                10/12/2024 1609 10/13/2024 1312 Tick-Borne Disease Panel by PCR [[id number]]    Peripheral Blood    Final result Component Value   Anaplasma phagocytophilum by PCR Not Detected   Babesia species by PCR Not Detected   Ehrlichia species by PCR Not Detected            10/12/2024 1605 10/12/2024 1652 Group A Streptococcus PCR Throat Swab [[id number]]    Swab from Throat    Final result Component Value   Group A strep by PCR Not Detected            10/12/2024 1514 10/12/2024 1600 Symptomatic Influenza A/B, RSV, & SARS-CoV2 PCR (COVID-19) Nasopharyngeal [[id number]]    Swab from Nasopharyngeal    Final result Component Value   Influenza A PCR Negative   Influenza B PCR Negative   RSV PCR Negative   SARS CoV2 PCR Negative   NEGATIVE: SARS-CoV-2 (COVID-19) RNA not detected, presumed negative.            10/12/2024 1514 10/13/2024 0103 Respiratory Panel PCR [[id number]]    (Abnormal)   Swab from Nasopharyngeal    Final result Component Value   Adenovirus Not Detected   Coronavirus Not Detected   This test detects Coronavirus 229E, HKU1, NL63 and OC43 but does not distinguish between them. It does not detect MERS ( Respiratory Syndrome), SARS (Severe Acute Respiratory Syndrome) or 2019-nCoV (Novel 2019) Coronavirus.   Human Metapneumovirus Not Detected   Human Rhin/Enterovirus Detected   Influenza A Not Detected   Influenza A, H1 Not Detected   Influenza A 2009 H1N1 Not Detected   Influenza A, H3 Not Detected   Influenza B Not Detected   Parainfluenza Virus 1 Not Detected   Parainfluenza Virus 2 Not Detected   Parainfluenza Virus 3 Not Detected   Parainfluenza Virus 4 Not Detected   Respiratory Syncytial Virus A Not Detected   Respiratory Syncytial Virus B Not Detected   Chlamydia Pneumoniae Not Detected   Mycoplasma Pneumoniae Not Detected                      RADIOLOGY:  Personally Reviewed.  Recent Results (from the past 24 hour(s))   US Abdomen Limited w Doppler Complete    Narrative    EXAM: US ABDOMEN LIMITED WITH DOPPLER COMPLETE  LOCATION: [facility]  DATE: 10/13/2024    INDICATION: Abnormal LFTs, check liver vasculature.  COMPARISON: The abdominal ultrasound from 10/12/2024.  TECHNIQUE: Complete abdominal ultrasound. Color flow with spectral Doppler and waveform analysis performed.     FINDINGS:    ABDOMINAL DUPLEX: The hepatic artery, hepatic veins, IVC, portal veins, and splenic vein are patent with flow in the normal direction.    VESSEL Velocity (cm/s)  Splenic V: 58  MPV: 47  RPV: 7  LPV: 14    LHV: 19  MHV: 18  RHV: 13    HA: PSV//48; RI 0.77      Impression    IMPRESSION:  Normal abdominal liver duplex.           Principal Problem:    Elevated liver enzymes  Active Problems:    Thrombocytopenia (H)    Febrile illness    Leukopenia, unspecified type

## 2024-10-14 NOTE — PROGRESS NOTES
[facility] Progress Note     SUBJECTIVE:    The patient reports feeling well today.  She denies any abdominal pain or swelling, fever, chills, neck pain, leg swelling, yellowing of the skin or eyes, or any other concerns.    OBJECTIVE:  BP 92/56 (BP Location: Right arm)   Pulse 90   Temp 99.7  F (37.6  C) (Oral)   Resp 20   Wt 53.8 kg (118 lb 11.2 oz)   LMP 2024 (Approximate)   SpO2 98%   Breastfeeding No   BMI 21.54 kg/m    Temp (24hrs), Av.8  F (37.7  C), Min:99.5  F (37.5  C), Max:100.1  F (37.8  C)    Patient Vitals for the past 72 hrs:   Weight   10/12/24 1443 53.8 kg (118 lb 11.2 oz)       Intake/Output Summary (Last 24 hours) at 10/14/2024 1131  Last data filed at 10/14/2024 0600  Gross per 24 hour   Intake 1498 ml   Output --   Net 1498 ml        PHYSICAL EXAM  GEN: Laying comfortably in bed, significant other at bedside  HRT: no LE edema  RESP: unlabored  ABD: Soft and nontender  SKIN: Visualized skin intact, no rash    Additional Data:  I have reviewed the patient's new clinical lab results:     Recent Labs   Lab Test 10/14/24  0710 10/13/24  1806 10/13/24  0710 10/12/24  1659   WBC 3.4* 2.2* 2.2*  --    HGB 11.5* 11.2* 11.2*  --    MCV 85 87 87  --    PLT 72* 68* 60*  --    INR  --   --  1.35* 1.32*     Recent Labs   Lab Test 10/14/24  0710 10/13/24  0710 10/12/24  1510   POTASSIUM 3.4 3.4 3.6   CHLORIDE 104 107 97*   CO2 20* 19* 23   BUN 4.8* 3.2* 6.9   ANIONGAP 13 9 13     Recent Labs   Lab Test 10/14/24  0710 10/13/24  0710 10/12/24  1900 10/12/24  1527 10/12/24  1510 10/12/24  1510 22  0948 02/16/22  0900   ALBUMIN 3.5 2.9* 3.4*  --    < > 4.1  --   --    BILITOTAL 0.9 1.3* 1.1  --    < > 0.9  --   --    * 250* 268*  --    < > 272*  --   --    * 303* 430*  --    < > 438*  --   --    PROTEIN  --   --   --  50*  --   --  Negative Negative   LIPASE  --   --   --   --   --  23  --   --     < > = values in this interval not displayed.     MELD 3.0: 10 at  10/14/2024  7:10 AM  MELD-Na: 10 at 10/14/2024  7:10 AM  Calculated from:  Serum Creatinine: 0.68 mg/dL (Using min of 1 mg/dL) at 10/14/2024  7:10 AM  Serum Sodium: 137 mmol/L at 10/14/2024  7:10 AM  Total Bilirubin: 0.9 mg/dL (Using min of 1 mg/dL) at 10/14/2024  7:10 AM  Serum Albumin: 3.5 g/dL at 10/14/2024  7:10 AM  INR(ratio): 1.35 at 10/13/2024  7:10 AM  Age at listing (hypothetical): 34 years  Sex: Female at 10/14/2024  7:10 AM    Imaging results:  Ultrasound abdomen limited 10/12/2024  Normal    Ultrasound abdomen limited with Doppler 10/13/2024  Normal     IMPRESSION:  Dianne Ochoa is a 34 year old female with no significant past medical history who presented to the hospital for fever, chills, rash, nausea, and upper abdominal pain on 10/12/2024, who we were asked to see for abnormal LFTs.    1.  Abnormal LFTs: Initial labs revealed abnormal LFTs with an AST of 438, ALT of 272, direct bili of 0.63, and alk phos of 325.  CBC was notable for leukopenia at 2.2, low platelets at 62. INR elevated to 1.32. Infectious disease consulting. Workup thus far as below:  -Acute hepatitis panel negative  -DENEEN, ASMA pending for autoimmune hepatitis  -AMA pending for primary biliary cholangitis  -Ceruloplasmin normal for Bon's disease  -Iron low, but ferritin elevated (likely acute phase reactant), low suspicion for hereditary hemochromatosis  -Alpha-1 antitrypsin lab pending  -CMV antibody negative, mononucleosis screen negative, HSV pending  -HIV negative  -Tylenol and salicylate levels normal.   -CRP downtrending 159-->105, Procalcitonin was normal.    -Influenza, RSV, and COVID testing were negative.  Respiratory panel testing revealed human rhinovirus/enterovirus.  Group A strep testing negative.    -Tickborne disease testing negative.    -Leptospira and parvovirus workup pending   -Normal abdominal ultrasound with Doppler.      PLAN:  -Liver workup pending  -Monitor LFT's  -Appreciate infectious disease consult    (  Winnie Conway PA-C  [facility]  10/14/2024 11:31 AM  [phone] (office)    40 minutes of total time was spent providing patient care, including patient evaluation, reviewing documentation/test results, , and documentation.  ________________________________________________________________________

## 2024-10-14 NOTE — TELEPHONE ENCOUNTER
Provider Communication    Who is calling:  Dr. Glover @ [facility]    Facility in which provider is associated:  [facility]    Reason for call:  Dr. Glover would like Dr. Smith to call back at [phone].    Okay to leave detailed message?:  Yes at Other phone number:  [phone].

## 2024-10-14 NOTE — PLAN OF CARE
Problem: Electrolyte Imbalance  Goal: Electrolyte Balance  Outcome: Progressing  Flowsheets (Taken 10/14/2024 0418)  Electrolyte Imbalance: Plan of Care: electrolyte balance     Problem: Infection  Goal: Absence of Infection Signs and Symptoms  Outcome: Progressing   Goal Outcome Evaluation:    Patient slept between cares.   Vitals stable on room air. No fevers overnight.   Denies pain.   Up independently in room. Patient's significant other at bedside. Plan of care reviewed with patient and spouse.  NSR on cardiac monitor. Sinus tachy at times with activity.   On phosphorus protocol. Received x3 phosphorus packets. Phosphorus recheck in AM.   On droplet precaution for Rhinovirus.

## 2024-10-15 LAB
MITOCHONDRIA M2 IGG SER-ACNC: <1 U/ML
SMA IGG SER-ACNC: 6 UNITS

## 2024-10-15 NOTE — TELEPHONE ENCOUNTER
Noted.  Platelets are low, which explains that epistaxis.  Since this sounds very mild, no further action needed at this time.

## 2024-10-15 NOTE — TELEPHONE ENCOUNTER
"Writer spoke to patient regarding Dr. Smith's message below. Patient states, she feels the same as when she left to the ER. Verified if patient had picked up the antibiotics prescribed for her. Patient has been taking the antibiotics at home.    Patient states that when she blows her nose, there is a little bit of \"blood\" on the tissue. Writer inquire how much, and patient states, \"There is about a string on the tissue.\" These symptoms were also present when patient was in the hospital.    Patient wanted to inform Dr. Smith the \"back of her skull hurts.\" Patient doesn't know if it is related to sleeping. Patient states the sides of the skull does not hurt, just the back. Writer did inform patient these concerns can be addressed during her in-person office visit. Patient verbalizes understanding and agrees with plan.    KELLI LangstonN, RN, N   [facility]    "

## 2024-10-17 ENCOUNTER — TELEPHONE (OUTPATIENT)
Dept: FAMILY MEDICINE | Facility: CLINIC | Age: 34
End: 2024-10-17

## 2024-10-17 ENCOUNTER — OFFICE VISIT (OUTPATIENT)
Dept: FAMILY MEDICINE | Facility: CLINIC | Age: 34
End: 2024-10-17
Payer: COMMERCIAL

## 2024-10-17 VITALS
SYSTOLIC BLOOD PRESSURE: 91 MMHG | OXYGEN SATURATION: 100 % | BODY MASS INDEX: 20.63 KG/M2 | WEIGHT: 112.08 LBS | HEIGHT: 62 IN | RESPIRATION RATE: 15 BRPM | TEMPERATURE: 97.9 F | HEART RATE: 94 BPM | DIASTOLIC BLOOD PRESSURE: 60 MMHG

## 2024-10-17 DIAGNOSIS — D72.819 LEUKOPENIA, UNSPECIFIED TYPE: ICD-10-CM

## 2024-10-17 DIAGNOSIS — R21 RASH: ICD-10-CM

## 2024-10-17 DIAGNOSIS — R74.8 ELEVATED LIVER ENZYMES: ICD-10-CM

## 2024-10-17 DIAGNOSIS — R50.9 FEBRILE ILLNESS: ICD-10-CM

## 2024-10-17 DIAGNOSIS — Z09 HOSPITAL DISCHARGE FOLLOW-UP: Primary | ICD-10-CM

## 2024-10-17 DIAGNOSIS — M43.6 NECK STIFFNESS: ICD-10-CM

## 2024-10-17 DIAGNOSIS — D69.6 THROMBOCYTOPENIA (H): ICD-10-CM

## 2024-10-17 LAB
B19V IGG SER IA-ACNC: 1.71 IV
B19V IGM SER IA-ACNC: 0.37 IV
BACTERIA BLD CULT: NO GROWTH
BASOPHILS # BLD AUTO: 0 10E3/UL (ref 0–0.2)
BASOPHILS NFR BLD AUTO: 0 %
EOSINOPHIL # BLD AUTO: 0.1 10E3/UL (ref 0–0.7)
EOSINOPHIL NFR BLD AUTO: 2 %
ERYTHROCYTE [DISTWIDTH] IN BLOOD BY AUTOMATED COUNT: 13 % (ref 10–15)
ERYTHROCYTE [SEDIMENTATION RATE] IN BLOOD BY WESTERGREN METHOD: 23 MM/HR (ref 0–20)
HCT VFR BLD AUTO: 38.2 % (ref 35–47)
HGB BLD-MCNC: 12.7 G/DL (ref 11.7–15.7)
IMM GRANULOCYTES # BLD: 0 10E3/UL
IMM GRANULOCYTES NFR BLD: 1 %
LEPTOSPIRA IGM SER QL: NEGATIVE
LYMPHOCYTES # BLD AUTO: 2.1 10E3/UL (ref 0.8–5.3)
LYMPHOCYTES NFR BLD AUTO: 34 %
MCH RBC QN AUTO: 28.5 PG (ref 26.5–33)
MCHC RBC AUTO-ENTMCNC: 33.2 G/DL (ref 31.5–36.5)
MCV RBC AUTO: 86 FL (ref 78–100)
MONOCYTES # BLD AUTO: 0.7 10E3/UL (ref 0–1.3)
MONOCYTES NFR BLD AUTO: 11 %
NEUTROPHILS # BLD AUTO: 3.2 10E3/UL (ref 1.6–8.3)
NEUTROPHILS NFR BLD AUTO: 53 %
PLATELET # BLD AUTO: 149 10E3/UL (ref 150–450)
RBC # BLD AUTO: 4.46 10E6/UL (ref 3.8–5.2)
WBC # BLD AUTO: 6.1 10E3/UL (ref 4–11)

## 2024-10-17 PROCEDURE — 82248 BILIRUBIN DIRECT: CPT | Performed by: FAMILY MEDICINE

## 2024-10-17 PROCEDURE — 99215 OFFICE O/P EST HI 40 MIN: CPT | Performed by: FAMILY MEDICINE

## 2024-10-17 PROCEDURE — 86140 C-REACTIVE PROTEIN: CPT | Performed by: FAMILY MEDICINE

## 2024-10-17 PROCEDURE — 85025 COMPLETE CBC W/AUTO DIFF WBC: CPT | Performed by: FAMILY MEDICINE

## 2024-10-17 PROCEDURE — 80053 COMPREHEN METABOLIC PANEL: CPT | Performed by: FAMILY MEDICINE

## 2024-10-17 PROCEDURE — 82306 VITAMIN D 25 HYDROXY: CPT | Performed by: FAMILY MEDICINE

## 2024-10-17 PROCEDURE — 85652 RBC SED RATE AUTOMATED: CPT | Performed by: FAMILY MEDICINE

## 2024-10-17 PROCEDURE — 36415 COLL VENOUS BLD VENIPUNCTURE: CPT | Performed by: FAMILY MEDICINE

## 2024-10-17 RX ORDER — DOXYCYCLINE 100 MG/1
100 CAPSULE ORAL 2 TIMES DAILY
Qty: 14 CAPSULE | Refills: 0 | Status: SHIPPED | OUTPATIENT
Start: 2024-10-17 | End: 2024-10-24

## 2024-10-17 NOTE — TELEPHONE ENCOUNTER
Please return call: Thank you for the updates.  This information is helpful and may have changed the initial testing recommendations while you are in the ER/hospital.  At this point, because you are improving on the current treatment, I would not recommend changing course at this time.      Axel Workman MD  [facility] [place] [address]  Phone: [phone]  Fax: [phone]    10/17/2024  3:58 PM      Spoke to patient on the phone to relay provider treatment recommendation above. Patient verbalized understood.  No further action needed.

## 2024-10-17 NOTE — PATIENT INSTRUCTIONS
-Thank you for choosing the [facility].  -It was a pleasure to see you today.  -Please take a look at the information below for more specific details regarding the treatment plan and recommendations.  -In this after visit summary is a list of your medications and specific instructions.  Please review this carefully as there may be changes made to your medication list.  -If there are any particular questions or concerns, please feel free to reach out to Dr. Workman.  -If any labs have been completed, we will reach out to you about results.  If the results are normal or not concerning, a letter or [facility] message will be sent to you.  If any follow-up is needed, either Dr. Workman or the nurse will give you a call.  If you have not heard regarding results after 2 weeks, please reach out to the clinic.    Patient Instructions:    -Continue doxycycline as prescribed.  -STOP the prenatal vitamins while you are taking the doxycycline medication.  -Mixing a spoonful of honey and warm water and drinking that throughout the day can be helpful to reduce the cough.  -It is recommended that you stay well-hydrated to help you recover.  Try to drink 64 ounces of water each day.  -Eat a balanced diet.  -You may take acetaminophen/Tylenol (up to 1000 mg once every 8 hours as needed) and ibuprofen/Advil (up to 600 mg once every 6 hours as needed) to help with any fevers and discomforts.  Avoid chronic long-term usage of these medications.      Please seek immediate medical attention (go to the emergency room or urgent care) for the following reasons: worsening symptoms, fever/chills return, worsening cough, shortness of breath, chest pain, dizziness, lightheadedness, confusion, feeling unwell, or any concerning changes.        --------------------------------------------------------------------------------------------------------------------    -We are always looking for ways to improve.  You may be selected to  receive a survey regarding your visit today.  We encourage you to complete the survey and provide specific, constructive feedback to help us improve our processes.  Thank you for your time!  -Please review the contact information listed on the after visit summary and in the electronic chart.  Below is the phone number that we have on file.  If there are any changes that are needed to be made, please reach out to the clinic.  [phone] (home)

## 2024-10-17 NOTE — TELEPHONE ENCOUNTER
General Call    Contacts       Contact Date/Time Type Contact Phone/Fax    10/17/2024 02:02 PM CDT Phone (Incoming) Dianne Ochoa (Self) [phone] (M)          Reason for Call:  Patient forgot to inform Dr. Workman of a concern    What are your questions or concerns:      Patient forgot to share something during visit today with Dr. Workman.    10/08, Tuesday night patient experienced fever & chills  10/09, Wednesday, patient heard the news about the free chickens in [place]. Patient obtained free chickens, and helped butched them. Patient did not eat them.  10/12, Saturday Patient went to the ER for fever, headache and rash.    Patient has been to the Hospital and they have not found anything. Patient just wanted to share this information with Dr. Workman as she does not know if this will have anything to do with her symptoms.    Date of last appointment with provider: 10/17/2024    Could we send this information to you in [facility] or would you prefer to receive a phone call?:   Patient would prefer a phone call   Okay to leave a detailed message?: Yes at Cell number on file:    Telephone Information:   Mobile [phone]     Routing message to Dr. Workman to review and advise.    Georgia Perales, KELLIN, RN, PHN   [facility]

## 2024-10-17 NOTE — PROGRESS NOTES
OFFICE VISIT    Assessment/Plan:     Patient Instructions:    -Continue doxycycline as prescribed.  -STOP the prenatal vitamins while you are taking the doxycycline medication.  -Mixing a spoonful of honey and warm water and drinking that throughout the day can be helpful to reduce the cough.  -It is recommended that you stay well-hydrated to help you recover.  Try to drink 64 ounces of water each day.  -Eat a balanced diet.  -You may take acetaminophen/Tylenol (up to 1000 mg once every 8 hours as needed) and ibuprofen/Advil (up to 600 mg once every 6 hours as needed) to help with any fevers and discomforts.  Avoid chronic long-term usage of these medications.      Please seek immediate medical attention (go to the emergency room or urgent care) for the following reasons: worsening symptoms, fever/chills return, worsening cough, shortness of breath, chest pain, dizziness, lightheadedness, confusion, feeling unwell, or any concerning changes.      Dianne was seen today for hospital f/u.  Diagnoses and all orders for this visit:    Hospital discharge follow-up  Neck stiffness  Elevated liver enzymes  Leukopenia, unspecified type  Thrombocytopenia (H)  Febrile illness  Rash: Patient does report significant improvement in symptoms.  No symptoms have resolved other than the neck stiffness at this time.  She does still have some abdominal burning symptoms, though not pain and nausea like before.  White blood cells and hemoglobin have both normalized again.  Platelets have improved and is at 149, just below the normal limit.  Other labs are still in process at time of this note.  Vitals are stable.  Patient does not have that typical meningitis exam findings.  Considering the severity of symptoms at onset, provider did recommend extending the doxycycline course for an additional 7 days.  Patient reports that she is able to tolerate the doxycycline medication.  Prescription printed and given to patient.  Because patient has  improved significantly overall, provider did not think that additional testing (such as lumbar puncture, repeat blood culture, etc.) or higher acuity of care are warranted at this time.  Exam was overall benign other than the neck stiffness as well as mild upper abdomen discomfort to palpation.  Reviewed conservative management and red flag signs.  Discussed use of Tylenol and ibuprofen.  -     Basic metabolic panel; Future  -     CBC with Platelets & Differential; Future  -     Hepatic function panel; Future  -     CRP inflammation; Future  -     Erythrocyte sedimentation rate auto; Future  -     Vitamin D Deficiency; Future  -     doxycycline hyclate (VIBRAMYCIN) 100 MG capsule; Take 1 capsule (100 mg) by mouth 2 times daily for 7 days.        Return if symptoms worsen or fail to improve.    The diagnoses, treatment options, risk, benefits, and recommendations were reviewed with patient/guardian.  Questions were answered to patient's/guardian satisfaction.  Red flag signs were reviewed.  Patient/guardian is in agreement with above plan.    Patient called nurse after visit to relay information below (italicized):   10/08, Tuesday night patient experienced fever & chills  10/09, Wednesday, patient heard the news about the free chickens in [place]. Patient obtained free chickens, and helped butched them. Patient did not eat them.  10/12, Saturday Patient went to the ER for fever, headache and rash.        Subjective: 34 year old female with history of elevated liver enzymes, anemia in pregnancy, lactating mother, leukopenia, thrombocytopenia who presents to clinic for the following complaints:   Patient presents with:  Hospital F/U: 10/12/2024 - 10/14/2024 (2 days)  [facility]        Patient was hospitalized with the following possible active diagnoses: Febrile illness, acute hepatitis, pancytopenia.     Per hospital discharge summary regarding hospital course (italicized):  34F without  significant PMH who presents with several days of fever, chills, nausea, epigastric abdominal pain, neck stiffness and a rash to bilateral upper and lower extremities and her back.  Found to have elevated LFTs and pancytopenia.  Tested positive for rhinovirus/enterovirus.     Empirically started on doxycycline due to concern for possible Tick borne illness with improvement in fever curve, inflammatory markers.  LFTs and cytopenia seem to be improving as well.     Ms. Ochoa has strong preference to discharge home today.  We discussed my preference to have Mrs. Ochoa remain another day to ensure LFTs and cytopenia are trending in the right direction, however, she really would like to get home to her kids and for better sleep.  I do no think it is unreasonable to discharge home today given improving fever curve & CRP, resolution of rash, improvement in neck pain, and what seems start in improvement in LFTs and cytopenias.  Close PCP already arranged for 10/17.  Mrs. Ochoa seems quite reliable and would return with recurrent fever or worsening symptoms.     #Febrile illness with severe sepsis  #Rhino/enterovirus +   #Acute hepatitis  #Diffuse macular rash on back and extremities, improving  #Pancytopenia  #Neck pain, query viral meningitis -   -Possibly all due to an enteroviral infection with exaggerated SIRS response which is self resolving vs. Bacterial infection being treated by doxycycline.  Either way seems to be improving at this time and will continue doxycycline for another 7 days.   -No headache, altered mental status, or focal neurologic symptom.  The neck pain is rapidly improving and I'm not sure an LP would be helpful at this time given clinical improvement.  -Close follow-up with PCP already arranged and will need to ensure continued clinical improvement and recovery of laboratory abnormalities.        Since discharge, patient has been better. She is much better, though still have symptoms. The fevers  reduced and did resolve. She still has some neck pains/stiffness. The neck pain worsens with movements and feels some pressure from the back of the  neck. The neck also feels hot at times.  Pressing on the neck does not seem to bring on discomforts. She was able to  and take the doxycycline as prescribed.  She does feel unwell or a bit after taking the doxycycline medication, though this is tolerable and she is okay to continue on the doxycycline. Abdominal pain is resolved, though still has some burning the epigastric region. Nausea is resolved. Rash has been resolved now.  Initially, the rash appeared to be a little red spots, though they are now ongoing. Eating and drinking has been better. Not quite normal yet, though has improved compared to when she was in the hospital. The urine is more yellow and slightly cloudy, too.  Denies any right upper quadrant pain, abdominal pain in general,burning urination, urinary frequency, or other symptoms different from baseline.    Her baby has a runny nose.  No other sick contacts.  No recent travels, going into the woods tick bites    Parvovirus B19 IgG test from 10/14/2024 is positive, though IgM is negative.  Herpes test is negative from 10/13/2024.  Acute hepatitis panel, influenza/RSV/COVID, and tickborne disease panel negative from 10/12/2024.  Entero and rhinovirus detected on 10/12/2024 (nasopharyngeal swab).  Other tests were negative.    Alpha-1 antitrypsin ana with reflex to pheno is in process.   Blood culture shows no growth after 4 days (obtained on 10/12/2024).    Patient presents with .  The 10 point review of system is negative except as stated in the HPI.    Allergies were reviewed and updated.       Latest Reference Range & Units 10/12/24 16:09 10/12/24 16:59 10/12/24 19:00 10/13/24 07:10   Sodium 135 - 145 mmol/L    135   Potassium 3.4 - 5.3 mmol/L    3.4   Chloride 98 - 107 mmol/L    107   Carbon Dioxide (CO2) 22 - 29 mmol/L    19 (L)    Urea Nitrogen 6.0 - 20.0 mg/dL    3.2 (L)   Creatinine 0.51 - 0.95 mg/dL    0.67   GFR Estimate >60 mL/min/1.73m2    >90   Calcium 8.8 - 10.4 mg/dL    6.9 (L)   Anion Gap 7 - 15 mmol/L    9   Magnesium 1.7 - 2.3 mg/dL    1.6 (L)   Phosphorus 2.5 - 4.5 mg/dL    1.9 (L)   Albumin 3.5 - 5.2 g/dL   3.4 (L) 2.9 (L)   Protein Total 6.4 - 8.3 g/dL   6.2 (L) 5.4 (L)   Alkaline Phosphatase 40 - 150 U/L   330 (H) 355 (H)   ALT 0 - 50 U/L   268 (H) 250 (H)   AST 0 - 45 U/L   430 (H) 303 (H)   Bilirubin Direct 0.00 - 0.30 mg/dL   0.98 (H)    Bilirubin Total <=1.2 mg/dL   1.1 1.3 (H)   CRP Inflammation <5.00 mg/L    105.30 (H)   F-Actin (Smooth Muscle) Ab, IgG by MARLA 0 - 19 Units   6    Ferritin 6 - 175 ng/mL    655 (H)   Glucose 70 - 99 mg/dL    86   Iron 37 - 145 ug/dL    21 (L)   Iron Binding Capacity 240 - 430 ug/dL    188 (L)   Iron Sat Index 15 - 46 %    11 (L)   WBC 4.0 - 11.0 10e3/uL    2.2 (L)   Hemoglobin 11.7 - 15.7 g/dL    11.2 (L)   Hematocrit 35.0 - 47.0 %    34.0 (L)   Platelet Count 150 - 450 10e3/uL    60 (L)   RBC Count 3.80 - 5.20 10e6/uL    3.92   MCV 78 - 100 fL    87   MCH 26.5 - 33.0 pg    28.6   MCHC 31.5 - 36.5 g/dL    32.9   RDW 10.0 - 15.0 %    12.5   % Neutrophils %    65   % Lymphocytes %    25   % Monocytes %    7   % Eosinophils %    1   % Basophils %    1   Absolute Basophils 0.0 - 0.2 10e3/uL    0.0   Absolute Eosinophils 0.0 - 0.7 10e3/uL    0.0   Absolute Immature Granulocytes <=0.4 10e3/uL    0.0   Absolute Lymphocytes 0.8 - 5.3 10e3/uL    0.5 (L)   Absolute Monocytes 0.0 - 1.3 10e3/uL    0.2   % Immature Granulocytes %    1   Absolute Neutrophils 1.6 - 8.3 10e3/uL    1.4 (L)   Absolute NRBCs 10e3/uL    0.0   NRBCs per 100 WBC <1 /100    0   RBC Morphology     Confirmed RBC Indices   Platelet Morphology Automated Count Confirmed. Platelet morphology is normal.     Automated Count Confirmed. Platelet morphology is normal.   INR 0.85 - 1.15   1.32 (H)  1.35 (H)   BLOOD CULTURE    Rpt (P)     TICK-BORNE DISEASE PANEL BY PCR  Rpt      CMV Heriberto IgM Instrument Value <30.0 AU/mL  <8.0     CMV Antibody IgM Negative   Negative     Hep B Surface Agn Nonreactive   Nonreactive     Hepatitis A IgM Heriberto Nonreactive   Nonreactive     Hepatitis B Core IgM Nonreactive   Nonreactive     Hepatitis C Antibody Nonreactive   Nonreactive     HIV Antigen Antibody Combo Nonreactive     Nonreactive   Mononucleosis Screen Negative     Negative   Salicylate mg/dL   3.1    ANTI NUCLEAR HERIBERTO IGG BY IFA WITH REFLEX   Rpt     Mitochondrial M2 Antibody IgG <4.0 U/mL  <1.0     (L): Data is abnormally low  (H): Data is abnormally high  (P): Preliminary  Rpt: View report in Results Review for more information     Latest Reference Range & Units 10/13/24 18:06 10/14/24 07:10   Sodium 135 - 145 mmol/L  137   Potassium 3.4 - 5.3 mmol/L  3.4   Chloride 98 - 107 mmol/L  104   Carbon Dioxide (CO2) 22 - 29 mmol/L  20 (L)   Urea Nitrogen 6.0 - 20.0 mg/dL  4.8 (L)   Creatinine 0.51 - 0.95 mg/dL  0.68   GFR Estimate >60 mL/min/1.73m2  >90   Calcium 8.8 - 10.4 mg/dL  7.9 (L)   Anion Gap 7 - 15 mmol/L  13   Phosphorus 2.5 - 4.5 mg/dL 2.1 (L) 2.6   Albumin 3.5 - 5.2 g/dL  3.5   Protein Total 6.4 - 8.3 g/dL  6.2 (L)   Alkaline Phosphatase 40 - 150 U/L  432 (H)   ALT 0 - 50 U/L  257 (H)   AST 0 - 45 U/L  232 (H)   Bilirubin Total <=1.2 mg/dL  0.9   CRP Inflammation <5.00 mg/L 87.90 (H)    Glucose 70 - 99 mg/dL  89   WBC 4.0 - 11.0 10e3/uL 2.2 (L) 3.4 (L)   Hemoglobin 11.7 - 15.7 g/dL 11.2 (L) 11.5 (L)   Hematocrit 35.0 - 47.0 % 35.4 35.2   Platelet Count 150 - 450 10e3/uL 68 (L) 72 (L)   RBC Count 3.80 - 5.20 10e6/uL 4.06 4.12   MCV 78 - 100 fL 87 85   MCH 26.5 - 33.0 pg 27.6 27.9   MCHC 31.5 - 36.5 g/dL 31.6 32.7   RDW 10.0 - 15.0 % 12.7 12.7   % Neutrophils % 73    % Lymphocytes % 20    % Monocytes % 3    % Eosinophils % 1    % Basophils % 0    % Other Cells % 3    Absolute Basophils 0.0 - 0.2 10e3/uL 0.0    Absolute Neutrophil 1.6 - 8.3 10e3/uL 1.6   "  Absolute Lymphocytes 0.8 - 5.3 10e3/uL 0.4 (L)    Absolute Monocytes 0.0 - 1.3 10e3/uL 0.1    Absolute Eosinophils 0.0 - 0.7 10e3/uL 0.0    Absolute Other Cells <=0.0 10e3/uL 0.1 (H)    RBC Morphology  Confirmed RBC Indices    Platelet Morphology Automated Count Confirmed. Platelet morphology is normal.  Automated Count Confirmed. Platelet morphology is normal.    Reactive Lymphs None Seen  Present !    % Retic 0.5 - 2.0 % 0.4 (L)    Absolute Retic 0.025 - 0.095 10e6/uL 0.014 (L)    (L): Data is abnormally low  (H): Data is abnormally high  !: Data is abnormal      Objective:   BP 91/60 (BP Location: Left arm, Patient Position: Sitting, Cuff Size: Adult Regular)   Pulse 94   Temp 97.9  F (36.6  C) (Oral)   Resp 15   Ht 1.581 m (5' 2.25\")   Wt 50.8 kg (112 lb 1.3 oz)   LMP 09/20/2024 (Approximate)   SpO2 100%   BMI 20.34 kg/m    General: Active, alert, nontoxic-appearing.  No acute distress.  HEENT: Normocephalic, atraumatic.  No discomforts noted to palpation along the cervical spine or the occipital region.  Range of motion limited by discomforts.  Negative Kernig and Brudzinski signs.  PERRLA.  No aversion to light.  EOM intact.  Ears: Normal external ear.  Normal, patent ear canal. Tympanic membrane: pearly, gray, transparent. Middle ear fluid is not noted.  Nasal discharge is noted to be clear.  Oropharynx: moist mucous membranes. Erythema of the posterior oropharynx is present. Tonsils do not have erythema/exudates. No masses, fluctuance, crepitus is noted of the neck.  Cervical lymphadenopathy is noted to palpation.   Cardiac: RRR.  S1, S2 present.  No murmurs, rubs, or gallops.  Respiratory/chest: Clear to auscultation bilaterally.  No wheezes, rales, rhonchi.  Breathing is not labored.  No accessory muscle usage.  Abdomen: Patient endorsing upper abdominal discomforts to palpation in the epigastric region and along the subcostal margin.  Otherwise, the abdomen is soft, nondistended.  No masses or " organomegaly noted.  No guarding or rebound tenderness appreciated.  Extremities: Voluntary movements intact.  Integumentary: No concerning rash or skin changes appreciated.    Amount of time spent in chart review, direct patient contact, care coordination, and related activities to patient care on the day of appointment: 40 minutes.       Axel Workman MD  [facility] [place] [address]  Phone: [phone]  Fax: [phone]    10/17/2024  1:39 PM            Current Outpatient Medications   Medication Sig Dispense Refill    acetaminophen (TYLENOL) 325 MG tablet Take 1 tablet (325 mg) by mouth every 4 hours as needed for mild pain, fever or headaches. Do not exceed 1,000mg in a 24 hours period      doxycycline hyclate (VIBRAMYCIN) 100 MG capsule Take 1 capsule (100 mg) by mouth 2 times daily for 7 days. 14 capsule 0    doxycycline monohydrate (MONODOX) 100 MG capsule Take 1 capsule (100 mg) by mouth 2 times daily. 14 capsule 0    hydrocortisone, Perianal, (HYDROCORTISONE) 2.5 % cream Place rectally 2 times daily as needed for hemorrhoids. 30 g 0    [START ON 10/22/2024] Prenatal MV-Min-Fe Fum-FA-DHA (PRENATAL MULTIVITAMIN PLUS DHA) 27-0.8-250 MG CAPS Take 1 tablet by mouth daily. OK to substitute formulary equivalent       No current facility-administered medications for this visit.       Allergies   Allergen Reactions    No Known Drug Allergy        Patient Active Problem List    Diagnosis Date Noted    Elevated liver enzymes 10/12/2024     Priority: Medium    Febrile illness 10/12/2024     Priority: Medium    Leukopenia, unspecified type 10/12/2024     Priority: Medium    Lactating mother 10/12/2022     Priority: Medium    Anemia in pregnancy 2022     Priority: Medium    History of 2019 novel coronavirus disease (COVID-19) 2022     Priority: Medium     Early pregnancy      History of  delivery, currently pregnant in second trimester 2022     Priority: Medium     Thrombocytopenia (H) 02/16/2022     Priority: Medium     likely gestational      Keloid scar 06/29/2018     Priority: Medium       Family History   Problem Relation Age of Onset    No Known Problems Mother     No Known Problems Father     No Known Problems Sister     No Known Problems Sister     No Known Problems Sister     No Known Problems Sister     No Known Problems Sister     No Known Problems Brother     No Known Problems Brother     No Known Problems Brother     No Known Problems Brother     No Known Problems Brother     No Known Problems Brother        Past Surgical History:   Procedure Laterality Date    HERNIA REPAIR          Social History     Socioeconomic History    Marital status: Single     Spouse name: Not on file    Number of children: Not on file    Years of education: Not on file    Highest education level: Not on file   Occupational History    Not on file   Tobacco Use    Smoking status: Never     Passive exposure: Never    Smokeless tobacco: Never   Vaping Use    Vaping status: Never Used   Substance and Sexual Activity    Alcohol use: No    Drug use: No    Sexual activity: Yes     Partners: Male     Birth control/protection: None   Other Topics Concern    Not on file   Social History Narrative    Not on file     Social Determinants of Health     Financial Resource Strain: Low Risk  (10/13/2024)    Financial Resource Strain     Within the past 12 months, have you or your family members you live with been unable to get utilities (heat, electricity) when it was really needed?: No   Food Insecurity: Low Risk  (10/13/2024)    Food Insecurity     Within the past 12 months, did you worry that your food would run out before you got money to buy more?: No     Within the past 12 months, did the food you bought just not last and you didn t have money to get more?: No   Transportation Needs: Low Risk  (10/13/2024)    Transportation Needs     Within the past 12 months, has lack of transportation kept you  from medical appointments, getting your medicines, non-medical meetings or appointments, work, or from getting things that you need?: No   Physical Activity: Not on file   Stress: Not on file   Social Connections: Not on file   Interpersonal Safety: Low Risk  (10/12/2024)    Interpersonal Safety     Do you feel physically and emotionally safe where you currently live?: Yes     Within the past 12 months, have you been hit, slapped, kicked or otherwise physically hurt by someone?: No     Within the past 12 months, have you been humiliated or emotionally abused in other ways by your partner or ex-partner?: No   Housing Stability: High Risk (10/13/2024)    Housing Stability     Do you have housing? : No     Are you worried about losing your housing?: No

## 2024-10-18 ENCOUNTER — NURSE TRIAGE (OUTPATIENT)
Dept: FAMILY MEDICINE | Facility: CLINIC | Age: 34
End: 2024-10-18
Payer: COMMERCIAL

## 2024-10-18 LAB
ALBUMIN SERPL BCG-MCNC: 3.8 G/DL (ref 3.5–5.2)
ALP SERPL-CCNC: 484 U/L (ref 40–150)
ALT SERPL W P-5'-P-CCNC: 397 U/L (ref 0–50)
ANION GAP SERPL CALCULATED.3IONS-SCNC: 11 MMOL/L (ref 7–15)
AST SERPL W P-5'-P-CCNC: 227 U/L (ref 0–45)
BILIRUB DIRECT SERPL-MCNC: 0.23 MG/DL (ref 0–0.3)
BILIRUB SERPL-MCNC: 0.4 MG/DL
BUN SERPL-MCNC: 8.1 MG/DL (ref 6–20)
CALCIUM SERPL-MCNC: 8.8 MG/DL (ref 8.8–10.4)
CHLORIDE SERPL-SCNC: 102 MMOL/L (ref 98–107)
CREAT SERPL-MCNC: 0.71 MG/DL (ref 0.51–0.95)
CRP SERPL-MCNC: 28.6 MG/L
EGFRCR SERPLBLD CKD-EPI 2021: >90 ML/MIN/1.73M2
GLUCOSE SERPL-MCNC: 138 MG/DL (ref 70–99)
HCO3 SERPL-SCNC: 25 MMOL/L (ref 22–29)
POTASSIUM SERPL-SCNC: 3.9 MMOL/L (ref 3.4–5.3)
PROT SERPL-MCNC: 7.3 G/DL (ref 6.4–8.3)
SODIUM SERPL-SCNC: 138 MMOL/L (ref 135–145)
VIT D+METAB SERPL-MCNC: 16 NG/ML (ref 20–50)

## 2024-10-18 NOTE — TELEPHONE ENCOUNTER
"Went to clinic and received results. ALT is high and she wants to know what this means      Last night she couldn't sleep because she was shortness of breath    \"What should I do?\"      Reason for Disposition    [1] MILD difficulty breathing (e.g., minimal/no SOB at rest, SOB with walking, pulse <100) AND [2] NEW-onset or WORSE than normal    Additional Information    Negative: SEVERE difficulty breathing (e.g., struggling for each breath, speaks in single words)    Negative: [1] Breathing stopped AND [2] hasn't returned    Negative: Choking on something    Negative: Bluish (or gray) lips or face now    Negative: Difficult to awaken or acting confused (e.g., disoriented, slurred speech)    Negative: Passed out (i.e., lost consciousness, collapsed and was not responding)    Negative: Wheezing started suddenly after medicine, an allergic food or bee sting    Negative: Stridor (harsh sound while breathing in)    Negative: Slow, shallow and weak breathing    Negative: Sounds like a life-threatening emergency to the triager    Negative: Chest pain    Negative: [1] Wheezing (high pitched whistling sound) AND [2] previous asthma attacks or use of asthma medicines    Negative: [1] Difficulty breathing AND [2] only present when coughing    Negative: [1] Difficulty breathing AND [2] only from stuffy or runny nose    Negative: [1] Difficulty breathing AND [2] within 14 days of COVID-19 Exposure    Negative: [1] MODERATE difficulty breathing (e.g., speaks in phrases, SOB even at rest, pulse 100-120) AND [2] NEW-onset or WORSE than normal    Negative: Oxygen level (e.g., pulse oximetry) 90 percent or lower    Negative: Wheezing can be heard across the room    Negative: Drooling or spitting out saliva (because can't swallow)    Negative: History of prior \"blood clot\" in leg or lungs (i.e., deep vein thrombosis, pulmonary embolism)    Negative: History of inherited increased risk of blood clots (e.g., Factor 5 Leiden, " "Anti-thrombin 3, Protein C or Protein S deficiency, Prothrombin mutation)    Negative: Major surgery in the past month    Negative: Hip or leg fracture (broken bone) in past month (or had cast on leg or ankle in past month)    Negative: Illness requiring prolonged bedrest in past month (e.g., immobilization, long hospital stay)    Negative: Long-distance travel in past month (e.g., car, bus, train, plane; with trip lasting 6 or more hours)    Negative: Cancer treatment in past six months (or has cancer now)    Negative: Extra heartbeats, irregular heart beating, or heart is beating very fast  (i.e., \"palpitations\")    Negative: Fever > 103 F (39.4 C)    Negative: [1] Fever > 101 F (38.3 C) AND [2] age > 60 years    Negative: [1] Fever > 100.0 F (37.8 C) AND [2] bedridden (e.g., CVA, chronic illness, recovering from surgery)    Negative: [1] Fever > 100.0 F (37.8 C) AND [2] diabetes mellitus or weak immune system (e.g., HIV positive, cancer chemo, splenectomy, organ transplant, chronic steroids)    Negative: [1] Periods where breathing stops and then resumes normally AND [2] bedridden (e.g., CVA)    Negative: Pregnant or postpartum (from 0 to 6 weeks after delivery)    Negative: Patient sounds very sick or weak to the triager    Answer Assessment - Initial Assessment Questions  1. RESPIRATORY STATUS: \"Describe your breathing?\" (e.g., wheezing, shortness of breath, unable to speak, severe coughing)       shortness of breath during the night, was sleeping and had to get up because \"I couldn't come back\"  Today she does not feel this \"It is just heavy on my chest\"  2. ONSET: \"When did this breathing problem begin?\"       Last night  3. PATTERN \"Does the difficult breathing come and go, or has it been constant since it started?\"       No shortness of breath now but just during the night when trying to sleep  4. SEVERITY: \"How bad is your breathing?\" (e.g., mild, moderate, severe)     - MILD: No SOB at rest, mild SOB with " "walking, speaks normally in sentences, can lie down, no retractions, pulse < 100.     - MODERATE: SOB at rest, SOB with minimal exertion and prefers to sit, cannot lie down flat, speaks in phrases, mild retractions, audible wheezing, pulse 100-120.     - SEVERE: Very SOB at rest, speaks in single words, struggling to breathe, sitting hunched forward, retractions, pulse > 120       Chest is very heavy  5. RECURRENT SYMPTOM: \"Have you had difficulty breathing before?\" If Yes, ask: \"When was the last time?\" and \"What happened that time?\"       no  6. CARDIAC HISTORY: \"Do you have any history of heart disease?\" (e.g., heart attack, angina, bypass surgery, angioplasty)       no  7. LUNG HISTORY: \"Do you have any history of lung disease?\"  (e.g., pulmonary embolus, asthma, emphysema)      no  8. CAUSE: \"What do you think is causing the breathing problem?\"      Not sure but was at hospital last week, a couple days ago \"they say that it is in my blood\"  9. OTHER SYMPTOMS: \"Do you have any other symptoms? (e.g., dizziness, runny nose, cough, chest pain, fever)      Heavy chest now , not painful just heavy, no fever , no cough  10. O2 SATURATION MONITOR:  \"Do you use an oxygen saturation monitor (pulse oximeter) at home?\" If Yes, ask: \"What is your reading (oxygen level) today?\" \"What is your usual oxygen saturation reading?\" (e.g., 95%)        no  11. PREGNANCY: \"Is there any chance you are pregnant?\" \"When was your last menstrual period?\"        no  12. TRAVEL: \"Have you traveled out of the country in the last month?\" (e.g., travel history, exposures)        no    Protocols used: Breathing Difficulty-A-AH    "

## 2024-10-18 NOTE — TELEPHONE ENCOUNTER
TO UC now for new onset shortness of breath last night and now chest feels heavy    Pt agrees    Trisha MARLEY RN, BSN  [facility]

## 2024-10-21 LAB
A1AT PHENOTYP SERPL-IMP: ABNORMAL
A1AT SERPL-MCNC: 203 MG/DL
A1AT SS SERPL-MCNC: NEGATIVE G/L
A1AT SZ SERPL-MCNC: ABNORMAL G/L
A1AT ZZ SERPL-MCNC: NEGATIVE G/L
SPECIMEN SOURCE: ABNORMAL

## 2024-10-24 ENCOUNTER — OFFICE VISIT (OUTPATIENT)
Dept: FAMILY MEDICINE | Facility: CLINIC | Age: 34
End: 2024-10-24
Payer: COMMERCIAL

## 2024-10-24 VITALS
OXYGEN SATURATION: 100 % | BODY MASS INDEX: 20.94 KG/M2 | WEIGHT: 113.8 LBS | SYSTOLIC BLOOD PRESSURE: 93 MMHG | HEART RATE: 69 BPM | DIASTOLIC BLOOD PRESSURE: 60 MMHG | TEMPERATURE: 98 F | HEIGHT: 62 IN | RESPIRATION RATE: 14 BRPM

## 2024-10-24 DIAGNOSIS — R74.8 ELEVATED LIVER ENZYMES: ICD-10-CM

## 2024-10-24 DIAGNOSIS — E55.9 VITAMIN D DEFICIENCY: ICD-10-CM

## 2024-10-24 DIAGNOSIS — M43.6 NECK STIFFNESS: ICD-10-CM

## 2024-10-24 DIAGNOSIS — R50.9 FEBRILE ILLNESS: Primary | ICD-10-CM

## 2024-10-24 DIAGNOSIS — R74.8 ELEVATED LIPASE: ICD-10-CM

## 2024-10-24 LAB
BASOPHILS # BLD AUTO: 0 10E3/UL (ref 0–0.2)
BASOPHILS NFR BLD AUTO: 1 %
EOSINOPHIL # BLD AUTO: 0 10E3/UL (ref 0–0.7)
EOSINOPHIL NFR BLD AUTO: 0 %
ERYTHROCYTE [DISTWIDTH] IN BLOOD BY AUTOMATED COUNT: 12.9 % (ref 10–15)
HCT VFR BLD AUTO: 37.3 % (ref 35–47)
HGB BLD-MCNC: 12 G/DL (ref 11.7–15.7)
IMM GRANULOCYTES # BLD: 0 10E3/UL
IMM GRANULOCYTES NFR BLD: 0 %
LYMPHOCYTES # BLD AUTO: 1.7 10E3/UL (ref 0.8–5.3)
LYMPHOCYTES NFR BLD AUTO: 30 %
MCH RBC QN AUTO: 28.8 PG (ref 26.5–33)
MCHC RBC AUTO-ENTMCNC: 32.2 G/DL (ref 31.5–36.5)
MCV RBC AUTO: 89 FL (ref 78–100)
MONOCYTES # BLD AUTO: 0.5 10E3/UL (ref 0–1.3)
MONOCYTES NFR BLD AUTO: 8 %
NEUTROPHILS # BLD AUTO: 3.6 10E3/UL (ref 1.6–8.3)
NEUTROPHILS NFR BLD AUTO: 61 %
PLATELET # BLD AUTO: 266 10E3/UL (ref 150–450)
RBC # BLD AUTO: 4.17 10E6/UL (ref 3.8–5.2)
WBC # BLD AUTO: 5.9 10E3/UL (ref 4–11)

## 2024-10-24 PROCEDURE — 99214 OFFICE O/P EST MOD 30 MIN: CPT | Performed by: FAMILY MEDICINE

## 2024-10-24 PROCEDURE — 82248 BILIRUBIN DIRECT: CPT | Performed by: FAMILY MEDICINE

## 2024-10-24 PROCEDURE — 36415 COLL VENOUS BLD VENIPUNCTURE: CPT | Performed by: FAMILY MEDICINE

## 2024-10-24 PROCEDURE — 80053 COMPREHEN METABOLIC PANEL: CPT | Performed by: FAMILY MEDICINE

## 2024-10-24 PROCEDURE — 83690 ASSAY OF LIPASE: CPT | Performed by: FAMILY MEDICINE

## 2024-10-24 PROCEDURE — 86140 C-REACTIVE PROTEIN: CPT | Performed by: FAMILY MEDICINE

## 2024-10-24 PROCEDURE — 85025 COMPLETE CBC W/AUTO DIFF WBC: CPT | Performed by: FAMILY MEDICINE

## 2024-10-24 RX ORDER — CHOLECALCIFEROL (VITAMIN D3) 50 MCG
1 TABLET ORAL DAILY
Qty: 90 TABLET | Refills: 3 | Status: SHIPPED | OUTPATIENT
Start: 2024-12-20

## 2024-10-24 NOTE — PATIENT INSTRUCTIONS
-Thank you for choosing the [facility].  -It was a pleasure to see you today.  -Please take a look at the information below for more specific details regarding the treatment plan and recommendations.  -In this after visit summary is a list of your medications and specific instructions.  Please review this carefully as there may be changes made to your medication list.  -If there are any particular questions or concerns, please feel free to reach out to Dr. Workman.  -If any labs have been completed, we will reach out to you about results.  If the results are normal or not concerning, a letter or [employer] message will be sent to you.  If any follow-up is needed, either Dr. Workman or the nurse will give you a call.  If you have not heard regarding results after 2 weeks, please reach out to the clinic.    Patient Instructions:    -Continue to take care of yourself as you have been.  -Avoid things that could potentially harm the liver.  -Eat things that are plain tasting for the next 2-4 weeks to help reduce irritation of your stomach.    Please seek immediate medical attention (go to the emergency room or urgent care) for the following reasons: worsening symptoms, or any concerning changes.      --------------------------------------------------------------------------------------------------------------------    -We are always looking for ways to improve.  You may be selected to receive a survey regarding your visit today.  We encourage you to complete the survey and provide specific, constructive feedback to help us improve our processes.  Thank you for your time!  -Please review the contact information listed on the after visit summary and in the electronic chart.  Below is the phone number that we have on file.  If there are any changes that are needed to be made, please reach out to the clinic.  [phone] (home)

## 2024-10-24 NOTE — PROGRESS NOTES
OFFICE VISIT    Assessment/Plan:     Patient Instructions:    -Continue to take care of yourself as you have been.  -Avoid things that could potentially harm the liver.  -Eat things that are plain tasting for the next 2-4 weeks to help reduce irritation of your stomach.    Please seek immediate medical attention (go to the emergency room or urgent care) for the following reasons: worsening symptoms, or any concerning changes.    Dianne was seen today for follow up.  Diagnoses and all orders for this visit:    Febrile illness  Neck stiffness  Elevated liver enzymes  Elevated lipase: labs have overall improved or normalized. Patient is also improved compared to last week. Plan to continue monitor going forward. Discussed indications to return for follow up. Patient likely had viral meningitis.   -     Basic metabolic panel; Future  -     CBC with Platelets & Differential; Future  -     Hepatic function panel; Future  -     Lipase; Future  -     CRP inflammation; Future    Vitamin D deficiency: start vitamin D below.   -     vitamin D3 (CHOLECALCIFEROL) 50 mcg (2000 units) tablet; Take 1 tablet (50 mcg) by mouth daily.        Return if symptoms worsen or fail to improve.    The diagnoses, treatment options, risk, benefits, and recommendations were reviewed with patient/guardian.  Questions were answered to patient's/guardian satisfaction.  Red flag signs were reviewed.  Patient/guardian is in agreement with above plan.      Subjective: 34 year old female with history of elevated liver enzymes, anemia (during pregnancy) who presents to clinic for the following complaints:   Patient presents with:  Follow Up: Stiff neck and fever    better    Answers submitted by the patient for this visit:  General Questionnaire (Submitted on 10/21/2024)  Chief Complaint: Chronic problems general questions HPI Form  What is the reason for your visit today? : Follow up  How many servings of fruits and vegetables do you eat daily?: 0-1  On  average, how many sweetened beverages do you drink each day (Examples: soda, juice, sweet tea, etc.  Do NOT count diet or artificially sweetened beverages)?: 0  How many minutes a day do you exercise enough to make your heart beat faster?: 20 to 29  How many days a week do you exercise enough to make your heart beat faster?: 3 or less  How many days per week do you miss taking your medication?: 2  What makes it hard for you to take your medication every day?: other  Questionnaire about: Chronic problems general questions HPI Form (Submitted on 10/21/2024)  Chief Complaint: Chronic problems general questions HPI Form    Since the last visit on 10/17/2024, patient did end up going to the ER on 10/18/2024 due to shortness of breath symptoms.  Labs have been completed at that visit as well.  Most notable labs include the CRP going from 20.6 the day before to 1.3 on the day of, glucose 167, normal blood counts, continued elevation of AST/ALT/alkaline phosphatase, elevated lipase up to 389.  There was discussion about admission, though patient ultimately elected for discharge.    She had been having shortness of breath when laying down. Having left ringing. She can still hear normally.     Reviewed previous labs.    Since discharge from the emergency room, patient has been much better compared to last week, but not quite normal yet. No fevers since the last visit. If she is on the phone screen for a while, she feels as if the phone/image keeps moving.  No other symptoms are noted. No nausea. Mild abdominal pains with pressing in the epigastric region. No significant light aversion. Eating and drinking has been normal now.     Entero and rhinovirus detected on 10/12/2024 (nasopharyngeal swab). Other tests were negative.     The inflammation markers are still elevated, though improved as well.  The liver continues to be injured, though the values are still elevated.      -The vitamin D level was low.  Dr. Workman recommends  "starting a high-dose once weekly vitamin D replacement followed by a low-dose daily vitamin D supplement.  Please let the nurse know if you are willing to start this medication.  -In addition, you should get at least 15 minutes of direct sun exposure on the skin of the arms and face each day to help improve the vitamin D.    -Rechecking the vitamin D in 3-6 months would be recommended.      The 10 point review of system is negative except as stated in the HPI.    Allergies were reviewed and updated.    Objective:   BP 93/60   Pulse 69   Temp 98  F (36.7  C) (Oral)   Resp 14   Ht 1.581 m (5' 2.24\")   Wt 51.6 kg (113 lb 12.8 oz)   LMP 09/20/2024 (Exact Date)   SpO2 100%   BMI 20.65 kg/m    General: Active, alert, nontoxic-appearing.  No acute distress.  HEENT: Normocephalic, atraumatic.  Pupils are equal and round.  Sclera is clear.  Normal external ears. Nares patent.  Moist mucous membranes.  Normal movement of the neck noted.   Cardiac: RRR.  S1, S2 present.  No murmurs, rubs, or gallops.  Respiratory/chest: Clear to auscultation bilaterally.  No wheezes, rales, rhonchi.  Breathing is not labored.  No accessory muscle usage.  Extremities: Voluntary movements intact.  Neurological: Cranial nerves II-XII are intact.  5/5 strength for upper and lower extremities.  Sensation to light touch intact in all dermatomes.  +2/+4 brachioradialis, biceps, triceps, patellar, and Achilles reflexes bilaterally.  Normal ambulation. Negative Kernig and Brudzinski signs.  Integumentary: No concerning rash or skin changes appreciated.        Axel Workman MD  [facility] [place] [address]  Phone: [phone]  Fax: [phone]    10/29/2024  7:04 AM          Current Outpatient Medications   Medication Sig Dispense Refill    [START ON 12/20/2024] vitamin D3 (CHOLECALCIFEROL) 50 mcg (2000 units) tablet Take 1 tablet (50 mcg) by mouth daily. 90 tablet 3    acetaminophen (TYLENOL) 325 MG tablet Take " 1 tablet (325 mg) by mouth every 4 hours as needed for mild pain, fever or headaches. Do not exceed 1,000mg in a 24 hours period (Patient not taking: Reported on 10/24/2024)      hydrocortisone, Perianal, (HYDROCORTISONE) 2.5 % cream Place rectally 2 times daily as needed for hemorrhoids. (Patient not taking: Reported on 10/24/2024) 30 g 0    Prenatal MV-Min-Fe Fum-FA-DHA (PRENATAL MULTIVITAMIN PLUS DHA) 27-0.8-250 MG CAPS Take 1 tablet by mouth daily. OK to substitute formulary equivalent (Patient not taking: Reported on 10/24/2024)       No current facility-administered medications for this visit.       Allergies   Allergen Reactions    No Known Drug Allergy        Patient Active Problem List    Diagnosis Date Noted    Elevated liver enzymes 10/12/2024     Priority: Medium    Febrile illness 10/12/2024     Priority: Medium    Leukopenia, unspecified type 10/12/2024     Priority: Medium    Lactating mother 10/12/2022     Priority: Medium    Anemia in pregnancy 2022     Priority: Medium    History of 2019 novel coronavirus disease (COVID-19) 2022     Priority: Medium     Early pregnancy      History of  delivery, currently pregnant in second trimester 2022     Priority: Medium    Thrombocytopenia (H) 2022     Priority: Medium     likely gestational      Keloid scar 2018     Priority: Medium       Family History   Problem Relation Age of Onset    No Known Problems Mother     No Known Problems Father     No Known Problems Sister     No Known Problems Sister     No Known Problems Sister     No Known Problems Sister     No Known Problems Sister     No Known Problems Brother     No Known Problems Brother     No Known Problems Brother     No Known Problems Brother     No Known Problems Brother     No Known Problems Brother        Past Surgical History:   Procedure Laterality Date    HERNIA REPAIR          Social History     Socioeconomic History    Marital status: Single     Spouse  name: Not on file    Number of children: Not on file    Years of education: Not on file    Highest education level: Not on file   Occupational History    Not on file   Tobacco Use    Smoking status: Never     Passive exposure: Never    Smokeless tobacco: Never   Vaping Use    Vaping status: Never Used   Substance and Sexual Activity    Alcohol use: No    Drug use: No    Sexual activity: Yes     Partners: Male     Birth control/protection: None   Other Topics Concern    Not on file   Social History Narrative    Not on file     Social Drivers of Health     Financial Resource Strain: Low Risk  (10/13/2024)    Financial Resource Strain     Within the past 12 months, have you or your family members you live with been unable to get utilities (heat, electricity) when it was really needed?: No   Food Insecurity: Low Risk  (10/13/2024)    Food Insecurity     Within the past 12 months, did you worry that your food would run out before you got money to buy more?: No     Within the past 12 months, did the food you bought just not last and you didn t have money to get more?: No   Transportation Needs: Low Risk  (10/13/2024)    Transportation Needs     Within the past 12 months, has lack of transportation kept you from medical appointments, getting your medicines, non-medical meetings or appointments, work, or from getting things that you need?: No   Physical Activity: Not on file   Stress: Not on file   Social Connections: Not on file   Interpersonal Safety: Low Risk  (10/12/2024)    Interpersonal Safety     Do you feel physically and emotionally safe where you currently live?: Yes     Within the past 12 months, have you been hit, slapped, kicked or otherwise physically hurt by someone?: No     Within the past 12 months, have you been humiliated or emotionally abused in other ways by your partner or ex-partner?: No   Housing Stability: High Risk (10/13/2024)    Housing Stability     Do you have housing? : No     Are you worried  about losing your housing?: No

## 2024-10-25 LAB
ALBUMIN SERPL BCG-MCNC: 4.2 G/DL (ref 3.5–5.2)
ALP SERPL-CCNC: 230 U/L (ref 40–150)
ALT SERPL W P-5'-P-CCNC: 116 U/L (ref 0–50)
ANION GAP SERPL CALCULATED.3IONS-SCNC: 11 MMOL/L (ref 7–15)
AST SERPL W P-5'-P-CCNC: 40 U/L (ref 0–45)
BILIRUB DIRECT SERPL-MCNC: <0.2 MG/DL (ref 0–0.3)
BILIRUB SERPL-MCNC: 0.4 MG/DL
BUN SERPL-MCNC: 15.2 MG/DL (ref 6–20)
CALCIUM SERPL-MCNC: 9.1 MG/DL (ref 8.8–10.4)
CHLORIDE SERPL-SCNC: 101 MMOL/L (ref 98–107)
CREAT SERPL-MCNC: 0.7 MG/DL (ref 0.51–0.95)
CRP SERPL-MCNC: <3 MG/L
EGFRCR SERPLBLD CKD-EPI 2021: >90 ML/MIN/1.73M2
GLUCOSE SERPL-MCNC: 160 MG/DL (ref 70–99)
HCO3 SERPL-SCNC: 24 MMOL/L (ref 22–29)
LIPASE SERPL-CCNC: 123 U/L (ref 13–60)
POTASSIUM SERPL-SCNC: 4.5 MMOL/L (ref 3.4–5.3)
PROT SERPL-MCNC: 7.8 G/DL (ref 6.4–8.3)
SODIUM SERPL-SCNC: 136 MMOL/L (ref 135–145)

## 2025-01-30 ENCOUNTER — TELEPHONE (OUTPATIENT)
Dept: FAMILY MEDICINE | Facility: CLINIC | Age: 35
End: 2025-01-30
Payer: COMMERCIAL

## 2025-04-29 ENCOUNTER — OFFICE VISIT (OUTPATIENT)
Dept: FAMILY MEDICINE | Facility: CLINIC | Age: 35
End: 2025-04-29
Payer: COMMERCIAL

## 2025-04-29 VITALS
OXYGEN SATURATION: 100 % | DIASTOLIC BLOOD PRESSURE: 63 MMHG | WEIGHT: 111 LBS | HEIGHT: 62 IN | SYSTOLIC BLOOD PRESSURE: 98 MMHG | BODY MASS INDEX: 20.43 KG/M2 | RESPIRATION RATE: 16 BRPM | TEMPERATURE: 98 F | HEART RATE: 64 BPM

## 2025-04-29 DIAGNOSIS — M53.82 WEAKNESS OF NECK: Primary | ICD-10-CM

## 2025-04-29 DIAGNOSIS — M25.532 CHRONIC PAIN OF LEFT WRIST: ICD-10-CM

## 2025-04-29 DIAGNOSIS — R73.9 HYPERGLYCEMIA: ICD-10-CM

## 2025-04-29 DIAGNOSIS — R74.01 NONSPECIFIC ELEVATION OF LEVELS OF TRANSAMINASE AND LACTIC ACID DEHYDROGENASE (LDH): ICD-10-CM

## 2025-04-29 DIAGNOSIS — G89.29 CHRONIC PAIN OF LEFT WRIST: ICD-10-CM

## 2025-04-29 DIAGNOSIS — H93.13 TINNITUS, BILATERAL: ICD-10-CM

## 2025-04-29 DIAGNOSIS — R74.02 NONSPECIFIC ELEVATION OF LEVELS OF TRANSAMINASE AND LACTIC ACID DEHYDROGENASE (LDH): ICD-10-CM

## 2025-04-29 LAB
EST. AVERAGE GLUCOSE BLD GHB EST-MCNC: 111 MG/DL
HBA1C MFR BLD: 5.5 % (ref 0–5.6)

## 2025-04-29 PROCEDURE — 99214 OFFICE O/P EST MOD 30 MIN: CPT | Performed by: FAMILY MEDICINE

## 2025-04-29 PROCEDURE — 36415 COLL VENOUS BLD VENIPUNCTURE: CPT | Performed by: FAMILY MEDICINE

## 2025-04-29 PROCEDURE — 3074F SYST BP LT 130 MM HG: CPT | Performed by: FAMILY MEDICINE

## 2025-04-29 PROCEDURE — 80076 HEPATIC FUNCTION PANEL: CPT | Performed by: FAMILY MEDICINE

## 2025-04-29 PROCEDURE — 83036 HEMOGLOBIN GLYCOSYLATED A1C: CPT | Performed by: FAMILY MEDICINE

## 2025-04-29 PROCEDURE — 3078F DIAST BP <80 MM HG: CPT | Performed by: FAMILY MEDICINE

## 2025-04-29 ASSESSMENT — PATIENT HEALTH QUESTIONNAIRE - PHQ9
10. IF YOU CHECKED OFF ANY PROBLEMS, HOW DIFFICULT HAVE THESE PROBLEMS MADE IT FOR YOU TO DO YOUR WORK, TAKE CARE OF THINGS AT HOME, OR GET ALONG WITH OTHER PEOPLE: NOT DIFFICULT AT ALL
SUM OF ALL RESPONSES TO PHQ QUESTIONS 1-9: 8
SUM OF ALL RESPONSES TO PHQ QUESTIONS 1-9: 8

## 2025-04-29 NOTE — PROGRESS NOTES
Prior to immunization administration, verified patients identity using patient s name and date of birth. Please see Immunization Activity for additional information.     Screening Questionnaire for Adult Immunization    Are you sick today?   No   Do you have allergies to medications, food, a vaccine component or latex?   No   Have you ever had a serious reaction after receiving a vaccination?   No   Do you have a long-term health problem with heart, lung, kidney, or metabolic disease (e.g., diabetes), asthma, a blood disorder, no spleen, complement component deficiency, a cochlear implant, or a spinal fluid leak?  Are you on long-term aspirin therapy?   No   Do you have cancer, leukemia, HIV/AIDS, or any other immune system problem?   No   Do you have a parent, brother, or sister with an immune system problem?   No   In the past 3 months, have you taken medications that affect  your immune system, such as prednisone, other steroids, or anticancer drugs; drugs for the treatment of rheumatoid arthritis, Crohn s disease, or psoriasis; or have you had radiation treatments?   No   Have you had a seizure, or a brain or other nervous system problem?   No   During the past year, have you received a transfusion of blood or blood    products, or been given immune (gamma) globulin or antiviral drug?   No   For women: Are you pregnant or is there a chance you could become       pregnant during the next month?   No   Have you received any vaccinations in the past 4 weeks?   No     Immunization questionnaire answers were all negative.      Patient instructed to remain in clinic for 15 minutes afterwards, and to report any adverse reactions.     Screening performed by Cathy Youssef CMA on 4/29/2025 at 2:21 PM.        Answers submitted by the patient for this visit:  Patient Health Questionnaire (Submitted on 4/29/2025)  If you checked off any problems, how difficult have these problems made it for you to do your work, take care  of things at home, or get along with other people?: Not difficult at all  PHQ9 TOTAL SCORE: 8  General Questionnaire (Submitted on 4/29/2025)  Chief Complaint: Chronic problems general questions HPI Form  How many days per week do you miss taking your medication?: 0  General Concern (Submitted on 4/29/2025)  Chief Complaint: Chronic problems general questions HPI Form  What is the reason for your visit today?: tinnitus and wrist pain  When did your symptoms begin?: More than a month  What are your symptoms?: hearing sounds all the time and wrist pain after cooking  How would you describe these symptoms?: Moderate  Are your symptoms:: Staying the same  Have you had these symptoms before?: No  Is there anything that makes you feel worse?: not treating  Is there anything that makes you feel better?: heal from symptoms  Questionnaire about: Chronic problems general questions HPI Form (Submitted on 4/29/2025)  Chief Complaint: Chronic problems general questions HPI Form

## 2025-04-29 NOTE — PROGRESS NOTES
"  {PROVIDER CHARTING PREFERENCE:008823}    Subjective   Dianne is a 34 year old, presenting for the following health issues:  Ear Problem (Hears a ringing noise in ears, constant since November ) and Arm Pain (Pain starting in wrist going up forearm, hurts when doing things like cooking, mostly on left)      4/29/2025     2:19 PM   Additional Questions   Roomed by Tia   Accompanied by Self     Ear Problem    Arm Pain    History of Present Illness       Reason for visit:  Tinnitus and wrist pain  Symptom onset:  More than a month  Symptoms include:  Hearing sounds all the time and wrist pain after cooking  Symptom intensity:  Moderate  Symptom progression:  Staying the same  Had these symptoms before:  No  What makes it worse:  Not treating  What makes it better:  Heal from symptoms   She is taking medications regularly.        {MA/LPN/RN Pre-Provider Visit Orders- hCG/UA/Strep (Optional):844672}  {SUPERLIST (Optional):442399}  {additonal problems for provider to add (Optional):173396}    {ROS Picklists (Optional):185781}      Objective    BP 98/63 (BP Location: Right arm, Patient Position: Sitting, Cuff Size: Adult Regular)   Pulse 64   Temp 98  F (36.7  C) (Temporal)   Resp 16   Ht 1.567 m (5' 1.69\")   Wt 50.3 kg (111 lb)   LMP 04/28/2025 (Exact Date)   SpO2 100%   BMI 20.50 kg/m    Body mass index is 20.5 kg/m .  Physical Exam   {Exam List (Optional):845951}    {Diagnostic Test Results (Optional):592067}        Signed Electronically by: Dyllan Stark MD  {Email feedback regarding this note to [email or web address]   :672092}  "

## 2025-04-30 ENCOUNTER — PATIENT OUTREACH (OUTPATIENT)
Dept: CARE COORDINATION | Facility: CLINIC | Age: 35
End: 2025-04-30
Payer: COMMERCIAL

## 2025-04-30 LAB
ALBUMIN SERPL BCG-MCNC: 4.7 G/DL (ref 3.5–5.2)
ALP SERPL-CCNC: 86 U/L (ref 40–150)
ALT SERPL W P-5'-P-CCNC: 13 U/L (ref 0–50)
AST SERPL W P-5'-P-CCNC: 22 U/L (ref 0–45)
BILIRUB DIRECT SERPL-MCNC: 0.09 MG/DL (ref 0–0.3)
BILIRUB SERPL-MCNC: 0.3 MG/DL
PROT SERPL-MCNC: 7.6 G/DL (ref 6.4–8.3)

## 2025-05-08 ENCOUNTER — THERAPY VISIT (OUTPATIENT)
Dept: PHYSICAL THERAPY | Facility: REHABILITATION | Age: 35
End: 2025-05-08
Attending: FAMILY MEDICINE
Payer: COMMERCIAL

## 2025-05-08 DIAGNOSIS — M25.532 CHRONIC PAIN OF LEFT WRIST: ICD-10-CM

## 2025-05-08 DIAGNOSIS — G89.29 CHRONIC PAIN OF LEFT WRIST: ICD-10-CM

## 2025-05-08 DIAGNOSIS — M53.82 WEAKNESS OF NECK: Primary | ICD-10-CM

## 2025-05-08 NOTE — PROGRESS NOTES
PHYSICAL THERAPY EVALUATION  Type of Visit: Evaluation       Fall Risk Screen:  Have you fallen 2 or more times in the past year?: No  Have you fallen and had an injury in the past year?: No  Is patient receiving Physical Therapy Services?: No    Subjective         Presenting condition or subjective complaint:    Pain started had a systemic illness in October that led to neck pain, headaches, and has felt weakness neck since. Feels that if she reads books, plays on her phone, will get tightness, feels like the bones could almost stick out (does not actually happen). Having trouble laying down on her side. Does report some discomfort in the neck before this as well. No history of trauma to the neck.  Pain described as left side of the neck and back of the neck, gets headaches if focusing on things for a long time. Gets headaches by the temples and between her eyes. Does radiate to the left shoulder and arm feels tight. Numbness just in the hand on the left, feels in the radial portion of the forearm and to the 4th digit.  Worse with playing on phone, reading, playing on her phone, numbness comes on with carrying her baby a lot in the past, at this point comes and goes.  Better with massage with the hands, neck she just changes positions.  Date of onset: 04/29/25    Relevant medical history:     Dates & types of surgery: Hernia    Prior diagnostic imaging/testing results:       Prior therapy history for the same diagnosis, illness or injury: No      Prior Level of Function  Transfers:   Ambulation:   ADL:   IADL:     Living Environment  Social support: With family members   Type of home: House   Stairs to enter the home: Yes       Ramp:     Stairs inside the home: Yes       Help at home: None  Equipment owned:       Employment: No    Hobbies/Interests: badminton volleyball gardening    Patient goals for therapy: sleep normally and physical activities    Pain assessment: none currently     Objective    Precautions/Restrictions: none  Involved side: left  Posture Observation:      Cervical protraction    Cervical ROM:    Date: 5/8/2025   *Indicate scale AROM   Cervical Flexion 50 deg   Cervical Extension 58 deg    Right Left   Cervical Sidebending 30 deg tightness 25 deg tightness   Cervical Rotation 70 deg 70 deg   Thoracic Rotation       Strength     Date: 5/8/2025   Cervical Myotomes/5 Right Left   Cervical Flexion (C1-2)     Cervical Sidebending (C3)     Shoulder Elevation (C4) 5 5   Shoulder Abduction (C5) 5 5   Elbow Flexion (C6) 5 5   Elbow Extension (C7) 5 5   Wrist Flexion (C7) 5 5   Wrist Extension (C6) 5 5   Thumb abduction (C8) 5 5   Finger Abduction (T1) 5 5     Sensation   intact to lt touch sensation screen  Reflex Testing  Cervical Dermatomes Right Left UE Reflexes Right Left   Back of the Head (C2)   Biceps (C5-6)     Supraclavicular Fossa (C3)   Brachioradialis (C5-6)     AC Joint (C4)   Triceps (C7-8)     Lateral Biceps (C5)   Joy's test     Palmar Thumb (C6)   LE Reflexes     Palmar 3rd Finger (C7)   Patellar (L3-4)     Palmar 5th Finger (C8)   Achilles (S1-2)     Ulnar Forearm (T1)   Babinski Response       Flexibility: mild dec UT    Palpation: tender to L cervical paraspinals, UT    Passive Mobility-Joint Integrity: WNL to side glides    Cervical Special Tests     Cervical Special Tests Right Left UE Nerve Mobility Right Left   Cervical compression - - Median nerve     Cervical distraction - - Ulnar nerve     Spurling's test - - Radial nerve     Shoulder abduction sign - - Thoracic outlet     Deep neck flexor endurance test unable unable Laury        Adson's     Sharper-Onel   Cervical rotation lateral flexion     Alar ligament test   Other:     Transverse Ligament Test   Other:       Assessment & Plan   CLINICAL IMPRESSIONS  Medical Diagnosis: Weakness of neck  Chronic pain of left wrist    Treatment Diagnosis: (P) neck pain, dec neck ROM, neck weakness   Impression/Assessment:  Patient is a 34 year old female with left sided neck pain complaints.  The following significant findings have been identified: Pain, Decreased ROM/flexibility, Decreased strength, Impaired muscle performance, Decreased activity tolerance, and Impaired posture. These impairments interfere with their ability to perform self care tasks, recreational activities, and household chores as compared to previous level of function. Neck pain seems mostly postural and related to cervical muscle weakness. Patient a good candidate for PT with good prognosis.    Clinical Decision Making (Complexity):  Clinical Presentation: Stable/Uncomplicated  Clinical Presentation Rationale: based on medical and personal factors listed in PT evaluation  Clinical Decision Making (Complexity): Low complexity    PLAN OF CARE  Treatment Interventions:  Interventions: Manual Therapy, Neuromuscular Re-education, Therapeutic Activity, Therapeutic Exercise, Self-Care/Home Management    Long Term Goals     PT Goal 1  Goal Description: Patient will be able to read for > 60 minutes without increased pain in 8 weeks  Target Date: 07/03/25  PT Goal 2  Goal Description: (P) Patient will be able to improve strength with DNF to > 40 seconds for improved cervical stability in 8 weeks  Target Date: (P) 07/03/25  PT Goal 3  Goal Description: (P) Patient will be able to do household chores > 1 hour without increased neck pain in 12 weeks  Target Date: (P) 07/31/25      Frequency of Treatment: 1X/week  Duration of Treatment: up to 8 visits over 12 weeks    Recommended Referrals to Other Professionals:   Education Assessment:   Learner/Method: Patient  Education Comments: PT POC, HEP    Risks and benefits of evaluation/treatment have been explained.   Patient/Family/caregiver agrees with Plan of Care.     Evaluation Time:     PT Eval, Low Complexity Minutes (49298): (P) 25       Signing Clinician: Tomas Beltran PT        [facility]                                                                                   OUTPATIENT PHYSICAL THERAPY      PLAN OF TREATMENT FOR OUTPATIENT REHABILITATION   Patient's Last Name, First Name, Dianne Salinas    YOB: 1990   Provider's Name   [facility]   Medical Record No.  [id number]     Onset Date: 04/29/25  Start of Care Date: 05/08/25     Medical Diagnosis:  Weakness of neck  Chronic pain of left wrist      PT Treatment Diagnosis:  (P) neck pain, dec neck ROM, neck weakness Plan of Treatment  Frequency/Duration: 1X/week/ up to 8 visits over 12 weeks    Certification date from 05/08/25 to 07/31/25         See note for plan of treatment details and functional goals     Tomas Beltran, PT                         I CERTIFY THE NEED FOR THESE SERVICES FURNISHED UNDER        THIS PLAN OF TREATMENT AND WHILE UNDER MY CARE     (Physician attestation of this document indicates review and certification of the therapy plan).              Referring Provider:  Dyllan Stark    Initial Assessment  See Epic Evaluation- Start of Care Date: 05/08/25

## 2025-05-15 ENCOUNTER — THERAPY VISIT (OUTPATIENT)
Dept: PHYSICAL THERAPY | Facility: REHABILITATION | Age: 35
End: 2025-05-15
Payer: COMMERCIAL

## 2025-05-15 DIAGNOSIS — M53.82 WEAKNESS OF NECK: Primary | ICD-10-CM

## 2025-05-18 ENCOUNTER — HEALTH MAINTENANCE LETTER (OUTPATIENT)
Age: 35
End: 2025-05-18

## 2025-06-05 ENCOUNTER — THERAPY VISIT (OUTPATIENT)
Dept: PHYSICAL THERAPY | Facility: REHABILITATION | Age: 35
End: 2025-06-05
Attending: FAMILY MEDICINE
Payer: COMMERCIAL

## 2025-06-05 DIAGNOSIS — M53.82 WEAKNESS OF NECK: Primary | ICD-10-CM

## 2025-06-05 DIAGNOSIS — M25.532 CHRONIC PAIN OF LEFT WRIST: ICD-10-CM

## 2025-06-05 DIAGNOSIS — G89.29 CHRONIC PAIN OF LEFT WRIST: ICD-10-CM

## 2025-06-26 ENCOUNTER — TELEPHONE (OUTPATIENT)
Dept: PHYSICAL THERAPY | Facility: REHABILITATION | Age: 35
End: 2025-06-26

## 2025-06-26 NOTE — TELEPHONE ENCOUNTER
PT called and informed for patient about missed appointment today. This was their 2nd NS. Patient feels she is doing well enough to continue on her own at this time. Encourage to reach back out if needed. No further PT visits scheduled at this time. Will discharge in 4 weeks if no return.     Tomas FELIX, PT

## 2025-07-08 NOTE — PROGRESS NOTES
Assessment & Plan     Assessment & Plan  - Bilateral tinnitus seems coorelated to neck  , with no associated hearing loss. . Continue neck exercises and use white noise or background noise apps, especially at bedtime. If hearing worsens suddenly, contact via MyChart for evaluation.    Follow up as needed if symptoms worsen or hearing changes.    Problem List Items Addressed This Visit    None  Visit Diagnoses         Tinnitus, bilateral                 Review of external notes as documented elsewhere in note    14 minutes spent on the date of the encounter doing chart review, history and exam, documentation and further activities per the note  {     PEPPER Ford  [facility] [place]    Subjective     HPI-    Recent relevant appt PRIMARY CARE PROVIDER 4/29:  3. Tinnitus, bilateral  Began with mysterious illness noted above ( systemic illness in October, because apparently never identified which led to fever, neck pain, headaches, elevated transaminase.)  No obvious hearing loss, no medications.  Deng negative today, will do hearing screen and MRI if she has asymmetric hearing loss  Otherwise refer to ENT.    History of Present Illness-Buzzing in both ears, more on the left, since a previous illness as above. No hearing loss. Occasional minor ear pain. Sensitive to phone noise, causing discomfort. Tightness sensation from ear to lips. Neck pain after illness led to therapy and neck exercises, which have reduced the noise. Difficulty sleeping when buzzing is loud.      Today ***    Audio essentially normal.      Review of Systems   ENT as above      Objective    /73 (BP Location: Left arm, Patient Position: Sitting, Cuff Size: Adult Regular)   Pulse 63   SpO2 100%     Physical Exam     Constitutional:   The patient was in no acute distress.      Head/Face:   Normocephalic and atraumatic.  No lesions or scars.     Ears:  The tympanic membranes are normal in appearance, bony  landmarks are intact.  No retraction, perforation, or masses.   No fluid or purulence was seen in the external canal or the middle ear. No evidence of infection of the middle ear or external canal, cerumen was normal in appearance.    Nose:  Anterior rhinoscopy revealed midline septum and absence of purulence or polyps.       Mouth:  Normal tongue, floor of mouth, buccal mucosa, and palate.  No lesions, ulceration or  masses on inspection, normal voice quality      Oropharynx:  Normal mucosa, palate symmetric with normal elevation. Tonsils  not visualized        Neck:  Supple with normal laryngeal and tracheal landmarks. No palpable thyroid.     Lymphatic:  There is no palpable lymphadenopathy in the neck.

## 2025-07-21 ENCOUNTER — OFFICE VISIT (OUTPATIENT)
Dept: AUDIOLOGY | Facility: CLINIC | Age: 35
End: 2025-07-21
Payer: COMMERCIAL

## 2025-07-21 ENCOUNTER — OFFICE VISIT (OUTPATIENT)
Dept: OTOLARYNGOLOGY | Facility: CLINIC | Age: 35
End: 2025-07-21
Payer: COMMERCIAL

## 2025-07-21 VITALS — SYSTOLIC BLOOD PRESSURE: 116 MMHG | OXYGEN SATURATION: 100 % | DIASTOLIC BLOOD PRESSURE: 73 MMHG | HEART RATE: 63 BPM

## 2025-07-21 DIAGNOSIS — H93.13 TINNITUS, BILATERAL: ICD-10-CM

## 2025-07-21 DIAGNOSIS — H93.13 TINNITUS OF BOTH EARS: Primary | ICD-10-CM

## 2025-07-21 PROCEDURE — 99202 OFFICE O/P NEW SF 15 MIN: CPT | Performed by: PHYSICIAN ASSISTANT

## 2025-07-21 PROCEDURE — 3078F DIAST BP <80 MM HG: CPT | Performed by: PHYSICIAN ASSISTANT

## 2025-07-21 PROCEDURE — 92557 COMPREHENSIVE HEARING TEST: CPT

## 2025-07-21 PROCEDURE — 3074F SYST BP LT 130 MM HG: CPT | Performed by: PHYSICIAN ASSISTANT

## 2025-07-21 PROCEDURE — 92550 TYMPANOMETRY & REFLEX THRESH: CPT

## 2025-07-21 NOTE — LETTER
7/21/2025      Dianne Ochoa  [address]  [place] [address]      Dear Colleague,    Thank you for referring your patient, Dianne Ochoa, to the [facility]. Please see a copy of my visit note below.    Assessment & Plan    Assessment & Plan  - Bilateral tinnitus seems coorelated to neck  , with no associated hearing loss. . Continue neck exercises and use white noise or background noise apps, especially at bedtime. If hearing worsens suddenly, contact via MyChart for evaluation.    Follow up as needed if symptoms worsen or hearing changes.    Problem List Items Addressed This Visit    None  Visit Diagnoses         Tinnitus, bilateral                 Review of external notes as documented elsewhere in note    14 minutes spent on the date of the encounter doing chart review, history and exam, documentation and further activities per the note  {     PEPPER Ford  [facility]    Subjective     HPI-    Recent relevant appt PRIMARY CARE PROVIDER 4/29:  3. Tinnitus, bilateral  Began with mysterious illness noted above ( systemic illness in October, because apparently never identified which led to fever, neck pain, headaches, elevated transaminase.)  No obvious hearing loss, no medications.  Deng negative today, will do hearing screen and MRI if she has asymmetric hearing loss  Otherwise refer to ENT.    History of Present Illness-Buzzing in both ears, more on the left, since a previous illness as above. No hearing loss. Occasional minor ear pain. Sensitive to phone noise, causing discomfort. Tightness sensation from ear to lips. Neck pain after illness led to therapy and neck exercises, which have reduced the noise. Difficulty sleeping when buzzing is loud.      Today ***    Audio essentially normal.      Review of Systems   ENT as above      Objective    /73 (BP Location: Left arm, Patient Position: Sitting, Cuff Size: Adult Regular)   Pulse 63   SpO2  100%     Physical Exam     Constitutional:   The patient was in no acute distress.      Head/Face:   Normocephalic and atraumatic.  No lesions or scars.     Ears:  The tympanic membranes are normal in appearance, bony landmarks are intact.  No retraction, perforation, or masses.   No fluid or purulence was seen in the external canal or the middle ear. No evidence of infection of the middle ear or external canal, cerumen was normal in appearance.    Nose:  Anterior rhinoscopy revealed midline septum and absence of purulence or polyps.       Mouth:  Normal tongue, floor of mouth, buccal mucosa, and palate.  No lesions, ulceration or  masses on inspection, normal voice quality      Oropharynx:  Normal mucosa, palate symmetric with normal elevation. Tonsils  not visualized        Neck:  Supple with normal laryngeal and tracheal landmarks. No palpable thyroid.     Lymphatic:  There is no palpable lymphadenopathy in the neck.    Again, thank you for allowing me to participate in the care of your patient.        Sincerely,        PEPPER Ford    Electronically signed

## 2025-07-21 NOTE — PATIENT INSTRUCTIONS
Tinnitus: Care Instructions  Overview     Many people have some ringing sounds in their ears once in a while. You may hear a roar, a hiss, a tinkle, or a buzz. The sound usually lasts only a few minutes. Ringing in the ears that doesn't get better or go away is called tinnitus.  Tinnitus is usually caused by long-term exposure to loud noise. This damages the nerves in the inner ear. It can occur with all types of hearing loss. It may be a symptom of almost any ear problem.  Tinnitus may be caused by a buildup of earwax. Or it may be caused by ear infections or certain medicines (especially antibiotics or large amounts of aspirin). You can also hear noises in your ears because of an injury to the ears or a medical condition.  You may need tests to evaluate your hearing and to find causes of long-lasting tinnitus.  Your doctor may suggest one or more treatments to help you cope with it. You can also do things at home to help reduce symptoms.  Follow-up care is a key part of your treatment and safety. Be sure to make and go to all appointments, and call your doctor if you are having problems. It's also a good idea to know your test results and keep a list of the medicines you take.  How can you care for yourself at home?  Do not smoke or use other tobacco products. Nicotine reduces blood flow to the ear and makes tinnitus worse. If you need help quitting, talk to your doctor about stop-smoking programs and medicines. These can increase your chances of quitting for good.  Talk to your doctor about whether to stop taking aspirin and similar products such as ibuprofen or naproxen.  Get exercise often. It can improve blood flow to the ear.  Ways to cope with noise  Some tinnitus may last a long time. To cope with noise, try to:  Avoid noises that you think caused your tinnitus. If you can't avoid loud noises, wear earplugs or earmuffs.  Ignore the sound by paying attention to other things.  Relax using biofeedback,  "meditation, or yoga. Feeling stressed and being tired can make tinnitus worse.  Play music or white noise to help you sleep. Background noise may cover up the noise that you hear in your ears. You can buy a machine that makes soothing sounds, such as ocean waves.  When should you call for help?   Call 911 anytime you think you may need emergency care. For example, call if:    You have symptoms of a stroke. These may include:  Sudden numbness, tingling, weakness, or loss of movement in your face, arm, or leg, especially on only one side of your body.  Sudden vision changes.  Sudden trouble speaking.  Sudden confusion or trouble understanding simple statements.  Sudden problems with walking or balance.  A sudden, severe headache that is different from past headaches.   Call your doctor now or seek immediate medical care if:    You develop other symptoms. These may include hearing loss (or worse hearing loss), balance problems, dizziness, nausea, or vomiting.   Watch closely for changes in your health, and be sure to contact your doctor if:    Your tinnitus moves from both ears to one ear.     Your hearing loss gets worse within 1 day after an ear injury.     Your tinnitus or hearing loss does not get better within 1 week after an ear injury.     Your tinnitus bothers you enough that you want to take medicines to help you cope with it.   Where can you learn more?  Go to https://www.[employer].net/patiented  Enter S165 in the search box to learn more about \"Tinnitus: Care Instructions.\"  Current as of: September 27, 2023               Content Version: 14.0    7534-4425 [employer].   Care instructions adapted under license by your healthcare professional. If you have questions about a medical condition or this instruction, always ask your healthcare professional. [employer] disclaims any warranty or liability for your use of this information.   "

## 2025-07-21 NOTE — PROGRESS NOTES
AUDIOLOGY REPORT:    Patient was referred to [facility] from ENT by Eugenio Lopze PA-C for a hearing examination. Patient is here today with concerns regarding constant, bilateral tinnitus that began after being sick last year. Dianne denies any concerns for her hearing along with any pain, drainage, dizziness, family history of hearing loss, history of ear surgeries or history of noise exposure. She does report intermittent aural fullness in the left ear. The patient was not accompanied to today's appointment      Testing:    Otoscopy:   Otoscopic exam indicates ears are clear of cerumen bilaterally     Tympanograms:    RIGHT: normal eardrum mobility     LEFT:   normal eardrum mobility    Reflexes (reported by stimulus ear): 1000 Hz  RIGHT: Ipsilateral is elevated at frequencies tested  RIGHT: Contralateral is absent at frequencies tested  LEFT:   Ipsilateral is present at normal levels  LEFT:   Contralateral is present at normal levels    Thresholds:   Pure Tone Thresholds assessed using conventional audiometry with good  reliability from 250-8000 Hz bilaterally using circumaural headphones     RIGHT:  normal hearing sensitivity at frequencies tested    LEFT:    normal hearing sensitivity at frequencies tested    Speech Reception Threshold:    RIGHT: 10 dB HL    LEFT:   10 dB HL  Speech Reception Thresholds are in good agreement with pure tone thresholds    Word Recognition Score:     RIGHT: 100% at 50 dB HL using NU-6 recorded word list.    LEFT:   100% at 50 dB HL using NU-6 recorded word list.    Discussed results with the patient.     Patient was returned to ENT for follow up.     Walker Bates, [facility]  Licensed Audiologist  [place] #[id number]